# Patient Record
Sex: MALE | Race: WHITE | NOT HISPANIC OR LATINO | Employment: UNEMPLOYED | URBAN - NONMETROPOLITAN AREA
[De-identification: names, ages, dates, MRNs, and addresses within clinical notes are randomized per-mention and may not be internally consistent; named-entity substitution may affect disease eponyms.]

---

## 2020-11-04 ENCOUNTER — OFFICE VISIT (OUTPATIENT)
Dept: FAMILY MEDICINE CLINIC | Facility: CLINIC | Age: 50
End: 2020-11-04

## 2020-11-04 VITALS
TEMPERATURE: 98.2 F | OXYGEN SATURATION: 98 % | SYSTOLIC BLOOD PRESSURE: 136 MMHG | WEIGHT: 235.4 LBS | HEIGHT: 74 IN | BODY MASS INDEX: 30.21 KG/M2 | DIASTOLIC BLOOD PRESSURE: 84 MMHG | HEART RATE: 91 BPM

## 2020-11-04 DIAGNOSIS — G89.29 CHRONIC NONINTRACTABLE HEADACHE, UNSPECIFIED HEADACHE TYPE: ICD-10-CM

## 2020-11-04 DIAGNOSIS — F43.10 PTSD (POST-TRAUMATIC STRESS DISORDER): ICD-10-CM

## 2020-11-04 DIAGNOSIS — R51.9 CHRONIC NONINTRACTABLE HEADACHE, UNSPECIFIED HEADACHE TYPE: ICD-10-CM

## 2020-11-04 DIAGNOSIS — E11.65 TYPE 2 DIABETES MELLITUS WITH HYPERGLYCEMIA, WITHOUT LONG-TERM CURRENT USE OF INSULIN (HCC): Primary | ICD-10-CM

## 2020-11-04 DIAGNOSIS — J30.2 SEASONAL ALLERGIES: ICD-10-CM

## 2020-11-04 PROCEDURE — 99203 OFFICE O/P NEW LOW 30 MIN: CPT | Performed by: FAMILY MEDICINE

## 2020-11-04 PROCEDURE — 80061 LIPID PANEL: CPT | Performed by: FAMILY MEDICINE

## 2020-11-04 PROCEDURE — 83036 HEMOGLOBIN GLYCOSYLATED A1C: CPT | Performed by: FAMILY MEDICINE

## 2020-11-04 PROCEDURE — 85025 COMPLETE CBC W/AUTO DIFF WBC: CPT | Performed by: FAMILY MEDICINE

## 2020-11-04 PROCEDURE — 80053 COMPREHEN METABOLIC PANEL: CPT | Performed by: FAMILY MEDICINE

## 2020-11-04 RX ORDER — LORATADINE 10 MG/1
10 TABLET ORAL DAILY
Qty: 90 TABLET | Refills: 3 | Status: SHIPPED | OUTPATIENT
Start: 2020-11-04 | End: 2020-12-10 | Stop reason: SDUPTHER

## 2020-11-04 RX ORDER — LORATADINE 10 MG/1
10 TABLET ORAL DAILY
COMMUNITY
End: 2020-11-04 | Stop reason: SDUPTHER

## 2020-11-04 RX ORDER — TRIAMCINOLONE ACETONIDE 55 UG/1
2 SPRAY, METERED NASAL DAILY
Qty: 16.5 G | Refills: 11 | Status: SHIPPED | OUTPATIENT
Start: 2020-11-04 | End: 2020-12-10

## 2020-11-04 RX ORDER — FLUOXETINE HYDROCHLORIDE 20 MG/1
20 CAPSULE ORAL DAILY
Qty: 90 CAPSULE | Refills: 0 | Status: SHIPPED | OUTPATIENT
Start: 2020-11-04 | End: 2020-12-10 | Stop reason: SDUPTHER

## 2020-11-04 NOTE — PROGRESS NOTES
"Subjective   Jamarcus Allison is a 50 y.o. male.   Pt presents today with CC of PTSD      History of Present Illness   Patient is a 50-year-old male here to establish care.  #1 patient reports a history of poorly controlled diabetes.  It has been a few years since he has taken medications for this.  He reports that he does not follow a diabetic diet.  Previously, he was trying to exercise more but has not been exercising this year because of COVID-19 concerns.  He is agreeable to labs today.  #2 patient reports a history of anxiety associated with PTSD.  PTSD triggers include noise.  He wears earmuffs and earplugs.  He has tried to invest in noise canceling headphones.  He reports that loud noises \"set him off\".  He has taken Prozac in the past.  He states he also took several other medications though he does not remember the names of them.  He was a patient with [facility] previously but was lost to follow-up.  He would like a referral to psychiatry.  He has a history of suicidal ideation.  He states that today he is not suicidal.  He has never attempted suicide, reportedly.  He has never had an actual plan to take his own life.  #3 patient has seasonal allergies.  He did not tolerate nasal sprays in the past but takes Claritin 10 mg daily year-round.  #4 he complains of chronic headaches.  Takes Tylenol as needed for these.  He denies vision changes and his headaches are not reported as intractable.  #5 he complains of chronic pain in his back and shoulders.  He denies trauma.  He admits that he has poor posture and this is associated.         The following portions of the patient's history were reviewed and updated as appropriate: allergies, current medications, past family history, past medical history, past social history, past surgical history and problem list.    Review of Systems   Constitutional: Negative for chills, fever and unexpected weight loss.   HENT: Negative for congestion and sore throat.    Eyes: " Negative for blurred vision and visual disturbance.   Respiratory: Negative for cough and wheezing.    Cardiovascular: Negative for chest pain and palpitations.   Gastrointestinal: Negative for abdominal pain and diarrhea.   Endocrine: Negative for cold intolerance and heat intolerance.   Genitourinary: Negative for dysuria.   Musculoskeletal: Negative for arthralgias and neck stiffness.   Neurological: Negative for dizziness, seizures and syncope.   Psychiatric/Behavioral: Negative for self-injury, suicidal ideas and depressed mood. The patient is nervous/anxious.        Objective   Physical Exam  Vitals signs and nursing note reviewed.   Constitutional:       Appearance: He is well-developed.   HENT:      Head: Normocephalic and atraumatic.      Right Ear: External ear normal.      Left Ear: External ear normal.      Nose: Nose normal.   Neck:      Musculoskeletal: Normal range of motion and neck supple.   Cardiovascular:      Rate and Rhythm: Normal rate and regular rhythm.      Heart sounds: Normal heart sounds.   Pulmonary:      Effort: Pulmonary effort is normal.      Breath sounds: Normal breath sounds.   Abdominal:      General: Bowel sounds are normal.      Palpations: Abdomen is soft.   Skin:     General: Skin is warm and dry.   Neurological:      Mental Status: He is alert and oriented to person, place, and time.   Psychiatric:      Comments: Patient had a normal appearance with the exception of wearing earplugs.  His speech was normal, he made normal eye contact, motor activity normal, his affect seemed to be full.  He does not seem to be depressed.  He is not suicidal.  Normal mood.  He is oriented to person place and time.  His short and long-term memory are intact.  He has normal attention.  He is not hallucinating and he does not have any delusions discovered.  He is not suicidal, homicidal, and he was cooperative with exam.  His insight seems to be good and his judgment seems to be good.  We did not  discuss what caused his PTSD because he has a therapist.           Assessment/Plan   Diagnoses and all orders for this visit:    1. Type 2 diabetes mellitus with hyperglycemia, without long-term current use of insulin (CMS/[facility]) (Primary)  -     Comprehensive Metabolic Panel; Future  -     CBC Auto Differential; Future  -     Hemoglobin A1c; Future  -     Lipid Panel; Future  -     Comprehensive Metabolic Panel  -     CBC Auto Differential  -     Hemoglobin A1c  -     Lipid Panel    2. Seasonal allergies  -     loratadine (CLARITIN) 10 MG tablet; Take 1 tablet by mouth Daily.  Dispense: 90 tablet; Refill: 3  -     Triamcinolone Acetonide (Nasacort Allergy 24HR) 55 MCG/ACT nasal inhaler; 2 sprays into the nostril(s) as directed by provider Daily.  Dispense: 16.5 g; Refill: 11  As he did not tolerate Flonase in the past, will try Nasacort as it is unscented.  May consider switching Claritin to Zyrtec as it tends to be stronger.  He is agreeable to the plan.  3. Chronic nonintractable headache, unspecified headache type  Recommend continuing Tylenol as needed.  We will check his lab work to ensure no abnormalities that may be correctable.  4. PTSD (post-traumatic stress disorder)  -     FLUoxetine (PROzac) 20 MG capsule; Take 1 capsule by mouth Daily.  Dispense: 90 capsule; Refill: 0  -     Ambulatory Referral to Psychiatry                 Patient's Body mass index is 30.22 kg/m². BMI is above normal parameters. Recommendations include: exercise counseling and nutrition counseling.

## 2020-11-05 DIAGNOSIS — E11.65 TYPE 2 DIABETES MELLITUS WITH HYPERGLYCEMIA, WITHOUT LONG-TERM CURRENT USE OF INSULIN (HCC): Primary | ICD-10-CM

## 2020-11-05 LAB
ALBUMIN SERPL-MCNC: 4.6 G/DL (ref 3.5–5.2)
ALBUMIN/GLOB SERPL: 1.6 G/DL
ALP SERPL-CCNC: 115 U/L (ref 39–117)
ALT SERPL W P-5'-P-CCNC: 114 U/L (ref 1–41)
ANION GAP SERPL CALCULATED.3IONS-SCNC: 10.9 MMOL/L (ref 5–15)
AST SERPL-CCNC: 57 U/L (ref 1–40)
BASOPHILS # BLD AUTO: 0.08 10*3/MM3 (ref 0–0.2)
BASOPHILS NFR BLD AUTO: 1 % (ref 0–1.5)
BILIRUB SERPL-MCNC: 0.5 MG/DL (ref 0–1.2)
BUN SERPL-MCNC: 10 MG/DL (ref 6–20)
BUN/CREAT SERPL: 16.9 (ref 7–25)
CALCIUM SPEC-SCNC: 10.1 MG/DL (ref 8.6–10.5)
CHLORIDE SERPL-SCNC: 99 MMOL/L (ref 98–107)
CHOLEST SERPL-MCNC: 237 MG/DL (ref 0–200)
CO2 SERPL-SCNC: 27.1 MMOL/L (ref 22–29)
CREAT SERPL-MCNC: 0.59 MG/DL (ref 0.76–1.27)
DEPRECATED RDW RBC AUTO: 40.3 FL (ref 37–54)
EOSINOPHIL # BLD AUTO: 0.12 10*3/MM3 (ref 0–0.4)
EOSINOPHIL NFR BLD AUTO: 1.4 % (ref 0.3–6.2)
ERYTHROCYTE [DISTWIDTH] IN BLOOD BY AUTOMATED COUNT: 12.6 % (ref 12.3–15.4)
GFR SERPL CREATININE-BSD FRML MDRD: 145 ML/MIN/1.73
GLOBULIN UR ELPH-MCNC: 2.9 GM/DL
GLUCOSE SERPL-MCNC: 360 MG/DL (ref 65–99)
HBA1C MFR BLD: 12.51 % (ref 4.8–5.6)
HCT VFR BLD AUTO: 50.2 % (ref 37.5–51)
HDLC SERPL-MCNC: 31 MG/DL (ref 40–60)
HGB BLD-MCNC: 16.8 G/DL (ref 13–17.7)
IMM GRANULOCYTES # BLD AUTO: 0.02 10*3/MM3 (ref 0–0.05)
IMM GRANULOCYTES NFR BLD AUTO: 0.2 % (ref 0–0.5)
LDLC SERPL CALC-MCNC: 169 MG/DL (ref 0–100)
LDLC/HDLC SERPL: 5.35 {RATIO}
LYMPHOCYTES # BLD AUTO: 3 10*3/MM3 (ref 0.7–3.1)
LYMPHOCYTES NFR BLD AUTO: 35.8 % (ref 19.6–45.3)
MCH RBC QN AUTO: 29.4 PG (ref 26.6–33)
MCHC RBC AUTO-ENTMCNC: 33.5 G/DL (ref 31.5–35.7)
MCV RBC AUTO: 87.8 FL (ref 79–97)
MONOCYTES # BLD AUTO: 0.67 10*3/MM3 (ref 0.1–0.9)
MONOCYTES NFR BLD AUTO: 8 % (ref 5–12)
NEUTROPHILS NFR BLD AUTO: 4.5 10*3/MM3 (ref 1.7–7)
NEUTROPHILS NFR BLD AUTO: 53.6 % (ref 42.7–76)
NRBC BLD AUTO-RTO: 0 /100 WBC (ref 0–0.2)
PLATELET # BLD AUTO: 288 10*3/MM3 (ref 140–450)
PMV BLD AUTO: 12.3 FL (ref 6–12)
POTASSIUM SERPL-SCNC: 4.5 MMOL/L (ref 3.5–5.2)
PROT SERPL-MCNC: 7.5 G/DL (ref 6–8.5)
RBC # BLD AUTO: 5.72 10*6/MM3 (ref 4.14–5.8)
SODIUM SERPL-SCNC: 137 MMOL/L (ref 136–145)
TRIGL SERPL-MCNC: 200 MG/DL (ref 0–150)
VLDLC SERPL-MCNC: 37 MG/DL (ref 5–40)
WBC # BLD AUTO: 8.39 10*3/MM3 (ref 3.4–10.8)

## 2020-11-05 RX ORDER — ATORVASTATIN CALCIUM 40 MG/1
40 TABLET, FILM COATED ORAL DAILY
Qty: 90 TABLET | Refills: 0 | Status: SHIPPED | OUTPATIENT
Start: 2020-11-19 | End: 2020-12-10 | Stop reason: SDUPTHER

## 2020-11-06 ENCOUNTER — TELEPHONE (OUTPATIENT)
Dept: FAMILY MEDICINE CLINIC | Facility: CLINIC | Age: 50
End: 2020-11-06

## 2020-12-10 ENCOUNTER — OFFICE VISIT (OUTPATIENT)
Dept: FAMILY MEDICINE CLINIC | Facility: CLINIC | Age: 50
End: 2020-12-10

## 2020-12-10 VITALS
HEART RATE: 84 BPM | TEMPERATURE: 97.1 F | HEIGHT: 74 IN | SYSTOLIC BLOOD PRESSURE: 130 MMHG | WEIGHT: 230 LBS | BODY MASS INDEX: 29.52 KG/M2 | DIASTOLIC BLOOD PRESSURE: 84 MMHG | OXYGEN SATURATION: 99 %

## 2020-12-10 DIAGNOSIS — J30.2 SEASONAL ALLERGIES: ICD-10-CM

## 2020-12-10 DIAGNOSIS — F43.10 PTSD (POST-TRAUMATIC STRESS DISORDER): ICD-10-CM

## 2020-12-10 DIAGNOSIS — E11.65 UNCONTROLLED TYPE 2 DIABETES MELLITUS WITH HYPERGLYCEMIA (HCC): Primary | ICD-10-CM

## 2020-12-10 DIAGNOSIS — E11.65 TYPE 2 DIABETES MELLITUS WITH HYPERGLYCEMIA, WITHOUT LONG-TERM CURRENT USE OF INSULIN (HCC): ICD-10-CM

## 2020-12-10 PROCEDURE — 99214 OFFICE O/P EST MOD 30 MIN: CPT | Performed by: FAMILY MEDICINE

## 2020-12-10 RX ORDER — FLUOXETINE HYDROCHLORIDE 20 MG/1
20 CAPSULE ORAL DAILY
Qty: 90 CAPSULE | Refills: 0 | Status: SHIPPED | OUTPATIENT
Start: 2020-12-10 | End: 2021-03-01 | Stop reason: SDUPTHER

## 2020-12-10 RX ORDER — BLOOD-GLUCOSE METER
1 KIT MISCELLANEOUS DAILY
Qty: 1 EACH | Refills: 0 | Status: SHIPPED | OUTPATIENT
Start: 2020-12-10

## 2020-12-10 RX ORDER — ATORVASTATIN CALCIUM 40 MG/1
40 TABLET, FILM COATED ORAL DAILY
Qty: 90 TABLET | Refills: 0 | Status: SHIPPED | OUTPATIENT
Start: 2020-12-10 | End: 2021-03-01 | Stop reason: SDUPTHER

## 2020-12-10 RX ORDER — LANCETS 28 GAUGE
EACH MISCELLANEOUS
Qty: 200 EACH | Refills: 12 | Status: SHIPPED | OUTPATIENT
Start: 2020-12-10 | End: 2021-06-24 | Stop reason: SDUPTHER

## 2020-12-10 RX ORDER — LISINOPRIL 5 MG/1
5 TABLET ORAL DAILY
Qty: 90 TABLET | Refills: 0 | Status: SHIPPED | OUTPATIENT
Start: 2020-12-10 | End: 2021-03-01 | Stop reason: SDUPTHER

## 2020-12-10 RX ORDER — GLIPIZIDE 5 MG/1
5 TABLET ORAL
Qty: 180 TABLET | Refills: 0 | Status: SHIPPED | OUTPATIENT
Start: 2020-12-10 | End: 2021-03-01 | Stop reason: SDUPTHER

## 2020-12-10 RX ORDER — LORATADINE 10 MG/1
10 TABLET ORAL DAILY
Qty: 90 TABLET | Refills: 3 | Status: SHIPPED | OUTPATIENT
Start: 2020-12-10 | End: 2021-03-01 | Stop reason: SDUPTHER

## 2020-12-10 NOTE — PROGRESS NOTES
Subjective   Jamarcus Allison is a 50 y.o. male.   Pt presents today with CC of Diabetes      History of Present Illness   1.  Patient is here to follow-up on poorly controlled diabetes.  He did not tolerate Metformin and would like to try something different.  He is agreeable to continuing statin, starting lisinopril 5 mg daily, and starting a different medication.  He admits that he does not exercise because of concern for COVID-19.  He is agreeable to check his blood sugar every day fasting.  He needs supplies.  #2 he is following up on PTSD.  He is doing well with Prozac 20 mg daily.  He would like refill.  He has an appointment with psychiatry coming up in February.  #3 he has seasonal allergies for which he takes loratadine.  This medication works well.           The following portions of the patient's history were reviewed and updated as appropriate: allergies, current medications, past family history, past medical history, past social history, past surgical history and problem list.    Review of Systems   Constitutional: Negative for chills, fever and unexpected weight loss.   HENT: Negative for congestion and sore throat.    Eyes: Negative for blurred vision and visual disturbance.   Respiratory: Negative for cough and wheezing.    Cardiovascular: Negative for chest pain and palpitations.   Gastrointestinal: Negative for abdominal pain and diarrhea.   Endocrine: Negative for cold intolerance and heat intolerance.   Genitourinary: Negative for dysuria.   Musculoskeletal: Negative for arthralgias and neck stiffness.   Neurological: Negative for dizziness, seizures and syncope.   Psychiatric/Behavioral: Negative for self-injury, suicidal ideas and depressed mood.       Objective   Physical Exam  Vitals signs and nursing note reviewed.   Constitutional:       Appearance: He is well-developed.   HENT:      Head: Normocephalic and atraumatic.      Right Ear: External ear normal.      Left Ear: External ear normal.       Nose: Nose normal.   Eyes:      Conjunctiva/sclera: Conjunctivae normal.      Pupils: Pupils are equal, round, and reactive to light.   Neck:      Musculoskeletal: Normal range of motion and neck supple.   Cardiovascular:      Rate and Rhythm: Normal rate and regular rhythm.      Heart sounds: Normal heart sounds.   Pulmonary:      Effort: Pulmonary effort is normal.      Breath sounds: Normal breath sounds.   Abdominal:      General: Bowel sounds are normal.      Palpations: Abdomen is soft.   Skin:     General: Skin is warm and dry.   Neurological:      Mental Status: He is alert and oriented to person, place, and time.   Psychiatric:         Behavior: Behavior normal.           Assessment/Plan   Diagnoses and all orders for this visit:    1. Uncontrolled type 2 diabetes mellitus with hyperglycemia (CMS/HCC) (Primary)    2. Seasonal allergies  -     loratadine (CLARITIN) 10 MG tablet; Take 1 tablet by mouth Daily.  Dispense: 90 tablet; Refill: 3    3. PTSD (post-traumatic stress disorder)  -     FLUoxetine (PROzac) 20 MG capsule; Take 1 capsule by mouth Daily.  Dispense: 90 capsule; Refill: 0  He has an appointment in February to establish with psychiatry.  He is agreeable to keep this appointment.  He is currently doing well on his current treatment.  4. Type 2 diabetes mellitus with hyperglycemia, without long-term current use of insulin (CMS/HCC)  -     atorvastatin (LIPITOR) 40 MG tablet; Take 1 tablet by mouth Daily.  Dispense: 90 tablet; Refill: 0  -     lisinopril (PRINIVIL,ZESTRIL) 5 MG tablet; Take 1 tablet by mouth Daily.  Dispense: 90 tablet; Refill: 0  -     glipizide (Glucotrol) 5 MG tablet; Take 1 tablet by mouth 2 (Two) Times a Day Before Meals.  Dispense: 180 tablet; Refill: 0  -     glucose blood test strip; Check daily  Dispense: 200 each; Refill: 12  -     Lancets (freestyle) lancets; Check daily fasting  Dispense: 200 each; Refill: 12  -     Blood Glucose Monitoring Suppl (B-D LOGIC BLOOD  GLUCOSE) w/Device kit; 1 Device Daily.  Dispense: 1 each; Refill: 0    Did not tolerate Metformin.  Will start glipizide 5 mg twice a day.  He states that he is cutting sugar out of his diet.  He is going to check his blood sugar every day.  He is on Lipitor 40, starting lisinopril 5 mg daily.  The side effects of these medications were all discussed.  If he has any problems he is going to call the office.  He is agreeable to exercise 3 times a week.                Patient's Body mass index is 29.52 kg/m². BMI is above normal parameters. Recommendations include: exercise counseling and nutrition counseling.

## 2020-12-16 PROBLEM — E11.65 TYPE 2 DIABETES MELLITUS WITH HYPERGLYCEMIA, WITHOUT LONG-TERM CURRENT USE OF INSULIN: Status: ACTIVE | Noted: 2020-12-16

## 2021-01-31 DIAGNOSIS — E11.65 TYPE 2 DIABETES MELLITUS WITH HYPERGLYCEMIA, WITHOUT LONG-TERM CURRENT USE OF INSULIN (HCC): ICD-10-CM

## 2021-02-01 NOTE — TELEPHONE ENCOUNTER
Who requested this refill?  He is no longer taking this medication as he reports he did not tolerate it and was switched to something different.

## 2021-03-01 ENCOUNTER — OFFICE VISIT (OUTPATIENT)
Dept: FAMILY MEDICINE CLINIC | Facility: CLINIC | Age: 51
End: 2021-03-01

## 2021-03-01 VITALS
DIASTOLIC BLOOD PRESSURE: 80 MMHG | TEMPERATURE: 96.8 F | HEART RATE: 85 BPM | WEIGHT: 230.2 LBS | SYSTOLIC BLOOD PRESSURE: 126 MMHG | BODY MASS INDEX: 29.54 KG/M2 | HEIGHT: 74 IN | OXYGEN SATURATION: 97 %

## 2021-03-01 DIAGNOSIS — J30.2 SEASONAL ALLERGIES: ICD-10-CM

## 2021-03-01 DIAGNOSIS — E11.65 TYPE 2 DIABETES MELLITUS WITH HYPERGLYCEMIA, WITHOUT LONG-TERM CURRENT USE OF INSULIN (HCC): ICD-10-CM

## 2021-03-01 DIAGNOSIS — F43.10 PTSD (POST-TRAUMATIC STRESS DISORDER): ICD-10-CM

## 2021-03-01 DIAGNOSIS — R73.9 HYPERGLYCEMIA: Primary | ICD-10-CM

## 2021-03-01 PROCEDURE — 99213 OFFICE O/P EST LOW 20 MIN: CPT | Performed by: FAMILY MEDICINE

## 2021-03-01 RX ORDER — PIOGLITAZONEHYDROCHLORIDE 15 MG/1
15 TABLET ORAL DAILY
Qty: 90 TABLET | Refills: 0 | Status: SHIPPED | OUTPATIENT
Start: 2021-03-01 | End: 2021-05-28

## 2021-03-01 RX ORDER — GLIPIZIDE 5 MG/1
5 TABLET ORAL
Qty: 180 TABLET | Refills: 0 | Status: SHIPPED | OUTPATIENT
Start: 2021-03-01 | End: 2021-05-28

## 2021-03-01 RX ORDER — LORATADINE 10 MG/1
10 TABLET ORAL DAILY
Qty: 90 TABLET | Refills: 3 | Status: SHIPPED | OUTPATIENT
Start: 2021-03-01 | End: 2021-06-24 | Stop reason: SDUPTHER

## 2021-03-01 RX ORDER — ATORVASTATIN CALCIUM 40 MG/1
40 TABLET, FILM COATED ORAL NIGHTLY
Qty: 90 TABLET | Refills: 0 | Status: SHIPPED | OUTPATIENT
Start: 2021-03-01 | End: 2021-06-24 | Stop reason: SDUPTHER

## 2021-03-01 RX ORDER — FLUOXETINE HYDROCHLORIDE 20 MG/1
20 CAPSULE ORAL DAILY
Qty: 90 CAPSULE | Refills: 0 | Status: SHIPPED | OUTPATIENT
Start: 2021-03-01 | End: 2021-04-13 | Stop reason: SDUPTHER

## 2021-03-01 RX ORDER — LISINOPRIL 5 MG/1
5 TABLET ORAL DAILY
Qty: 90 TABLET | Refills: 0 | Status: SHIPPED | OUTPATIENT
Start: 2021-03-01 | End: 2021-05-28

## 2021-03-01 NOTE — PROGRESS NOTES
Subjective   Jamarcus Allison is a 51 y.o. male.   Pt presents today with CC of Diabetes      History of Present Illness   1.  Patient is a 51-year-old male with severe anxiety here to follow-up on hyperglycemia and type 2 diabetes.  He currently takes Glucotrol just 5 mg daily.  He refuses Metformin as he had diarrhea in the past with this medication.  He is agreeable to other medications though cost is a major concern.  He reports that he has no money to pay co-pay.  #2 he has history of PTSD associated with severe anxiety and agoraphobia.  He takes Prozac 20 mg daily.  He missed his appointment with psychiatry.  He is agreeable to reschedule.  #3 for financial reasons, he does not want labs today but is agreeable to have them in 3 months.         The following portions of the patient's history were reviewed and updated as appropriate: allergies, current medications, past family history, past medical history, past social history, past surgical history and problem list.    Review of Systems   Constitutional: Negative for chills, fever and unexpected weight loss.   HENT: Negative for congestion and sore throat.    Eyes: Negative for blurred vision and visual disturbance.   Respiratory: Negative for cough and wheezing.    Cardiovascular: Negative for chest pain and palpitations.   Gastrointestinal: Negative for abdominal pain and diarrhea.   Endocrine: Negative for cold intolerance and heat intolerance.   Genitourinary: Negative for dysuria.   Musculoskeletal: Negative for arthralgias and neck stiffness.   Neurological: Negative for dizziness, seizures and syncope.   Psychiatric/Behavioral: Negative for self-injury, suicidal ideas and depressed mood.       Objective   Physical Exam  Vitals signs and nursing note reviewed.   Constitutional:       Appearance: He is well-developed.   HENT:      Head: Normocephalic and atraumatic.      Right Ear: External ear normal.      Left Ear: External ear normal.      Nose: Nose  normal.   Eyes:      Conjunctiva/sclera: Conjunctivae normal.      Pupils: Pupils are equal, round, and reactive to light.   Neck:      Musculoskeletal: Normal range of motion and neck supple.   Cardiovascular:      Rate and Rhythm: Normal rate and regular rhythm.      Heart sounds: Normal heart sounds.   Pulmonary:      Effort: Pulmonary effort is normal.      Breath sounds: Normal breath sounds.   Abdominal:      General: Bowel sounds are normal.      Palpations: Abdomen is soft.   Skin:     General: Skin is warm and dry.   Neurological:      Mental Status: He is alert and oriented to person, place, and time.   Psychiatric:         Behavior: Behavior normal.           Assessment/Plan   Diagnoses and all orders for this visit:    1. Hyperglycemia (Primary)  As below  2. Type 2 diabetes mellitus with hyperglycemia, without long-term current use of insulin (CMS/[facility])  -     glipizide (Glucotrol) 5 MG tablet; Take 1 tablet by mouth 2 (Two) Times a Day Before Meals.  Dispense: 180 tablet; Refill: 0  -     lisinopril (PRINIVIL,ZESTRIL) 5 MG tablet; Take 1 tablet by mouth Daily.  Dispense: 90 tablet; Refill: 0  -     atorvastatin (LIPITOR) 40 MG tablet; Take 1 tablet by mouth Every Night.  Dispense: 90 tablet; Refill: 0  We will start Actos 15 mg daily.  The risks and benefits of this medication were discussed.  He is not agreeable to insulin or any other injectable.  I doubt that Actos will significantly control his blood sugar though he would like to do something with it.  He is agreeable to strict diet.  He is lost 5 pounds since November.  We will add another medication in the future if needed.  May consider retrial of Metformin at a lower dose.  3. PTSD (post-traumatic stress disorder)  -     FLUoxetine (PROzac) 20 MG capsule; Take 1 capsule by mouth Daily.  Dispense: 90 capsule; Refill: 0  He has missed his appointment with psychiatry.  He states that he is currently stable on Prozac though will need to reschedule  with psychiatry.  4. Seasonal allergies  -     loratadine (CLARITIN) 10 MG tablet; Take 1 tablet by mouth Daily.  Dispense: 90 tablet; Refill: 3    Other orders  -     pioglitazone (Actos) 15 MG tablet; Take 1 tablet by mouth Daily.  Dispense: 90 tablet; Refill: 0                 Patient's Body mass index is 29.54 kg/m². BMI is above normal parameters. Recommendations include: exercise counseling and nutrition counseling.

## 2021-04-13 ENCOUNTER — OFFICE VISIT (OUTPATIENT)
Dept: PSYCHIATRY | Facility: CLINIC | Age: 51
End: 2021-04-13

## 2021-04-13 VITALS
BODY MASS INDEX: 30.6 KG/M2 | HEIGHT: 74 IN | WEIGHT: 238.4 LBS | DIASTOLIC BLOOD PRESSURE: 78 MMHG | HEART RATE: 79 BPM | SYSTOLIC BLOOD PRESSURE: 130 MMHG

## 2021-04-13 DIAGNOSIS — F60.9 PERSONALITY DISORDER, UNSPECIFIED (HCC): ICD-10-CM

## 2021-04-13 DIAGNOSIS — Z79.899 MEDICATION MANAGEMENT: ICD-10-CM

## 2021-04-13 DIAGNOSIS — G47.09 OTHER INSOMNIA: ICD-10-CM

## 2021-04-13 DIAGNOSIS — F41.1 GENERALIZED ANXIETY DISORDER: ICD-10-CM

## 2021-04-13 DIAGNOSIS — F33.3 SEVERE EPISODE OF RECURRENT MAJOR DEPRESSIVE DISORDER, WITH PSYCHOTIC FEATURES (HCC): Primary | ICD-10-CM

## 2021-04-13 DIAGNOSIS — F39 MOOD DISORDER (HCC): ICD-10-CM

## 2021-04-13 DIAGNOSIS — F43.10 PTSD (POST-TRAUMATIC STRESS DISORDER): ICD-10-CM

## 2021-04-13 LAB
AMPHETAMINE CUT-OFF: NORMAL
BENZODIAZIPINE CUT-OFF: NORMAL
BUPRENORPHINE CUT-OFF: NORMAL
COCAINE CUT-OFF: NORMAL
EXTERNAL AMPHETAMINE SCREEN URINE: NEGATIVE
EXTERNAL BENZODIAZEPINE SCREEN URINE: NEGATIVE
EXTERNAL BUPRENORPHINE SCREEN URINE: NEGATIVE
EXTERNAL COCAINE SCREEN URINE: NEGATIVE
EXTERNAL MDMA: NEGATIVE
EXTERNAL METHADONE SCREEN URINE: NEGATIVE
EXTERNAL METHAMPHETAMINE SCREEN URINE: NEGATIVE
EXTERNAL OPIATES SCREEN URINE: NEGATIVE
EXTERNAL OXYCODONE SCREEN URINE: NEGATIVE
EXTERNAL THC SCREEN URINE: NEGATIVE
MDMA CUT-OFF: NORMAL
METHADONE CUT-OFF: NORMAL
METHAMPHETAMINE CUT-OFF: NORMAL
OPIATES CUT-OFF: NORMAL
OXYCODONE CUT-OFF: NORMAL
THC CUT-OFF: NORMAL

## 2021-04-13 PROCEDURE — 90792 PSYCH DIAG EVAL W/MED SRVCS: CPT | Performed by: NURSE PRACTITIONER

## 2021-04-13 RX ORDER — FLUOXETINE HYDROCHLORIDE 40 MG/1
40 CAPSULE ORAL DAILY
Qty: 30 CAPSULE | Refills: 0 | Status: SHIPPED | OUTPATIENT
Start: 2021-04-13 | End: 2021-05-11 | Stop reason: SDUPTHER

## 2021-04-13 RX ORDER — TRAZODONE HYDROCHLORIDE 50 MG/1
50 TABLET ORAL NIGHTLY
Qty: 30 TABLET | Refills: 0 | Status: SHIPPED | OUTPATIENT
Start: 2021-04-13 | End: 2021-05-10

## 2021-04-13 RX ORDER — HYDROXYZINE HYDROCHLORIDE 10 MG/1
10 TABLET, FILM COATED ORAL 2 TIMES DAILY PRN
Qty: 60 TABLET | Refills: 0 | Status: SHIPPED | OUTPATIENT
Start: 2021-04-13 | End: 2021-05-11 | Stop reason: SDUPTHER
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
## 2021-05-10 DIAGNOSIS — G47.09 OTHER INSOMNIA: ICD-10-CM

## 2021-05-10 RX ORDER — TRAZODONE HYDROCHLORIDE 50 MG/1
50 TABLET ORAL NIGHTLY
Qty: 30 TABLET | Refills: 0 | Status: SHIPPED | OUTPATIENT
Start: 2021-05-10 | End: 2021-05-11 | Stop reason: SDUPTHER

## 2021-05-11 ENCOUNTER — OFFICE VISIT (OUTPATIENT)
Dept: PSYCHIATRY | Facility: CLINIC | Age: 51
End: 2021-05-11

## 2021-05-11 VITALS
BODY MASS INDEX: 31.39 KG/M2 | HEIGHT: 74 IN | WEIGHT: 244.6 LBS | DIASTOLIC BLOOD PRESSURE: 83 MMHG | HEART RATE: 80 BPM | SYSTOLIC BLOOD PRESSURE: 126 MMHG

## 2021-05-11 DIAGNOSIS — F33.3 SEVERE EPISODE OF RECURRENT MAJOR DEPRESSIVE DISORDER, WITH PSYCHOTIC FEATURES (HCC): Primary | ICD-10-CM

## 2021-05-11 DIAGNOSIS — F41.1 GENERALIZED ANXIETY DISORDER: ICD-10-CM

## 2021-05-11 DIAGNOSIS — F60.9 PERSONALITY DISORDER, UNSPECIFIED (HCC): ICD-10-CM

## 2021-05-11 DIAGNOSIS — F43.10 PTSD (POST-TRAUMATIC STRESS DISORDER): ICD-10-CM

## 2021-05-11 DIAGNOSIS — G47.09 OTHER INSOMNIA: ICD-10-CM

## 2021-05-11 DIAGNOSIS — F39 MOOD DISORDER (HCC): ICD-10-CM

## 2021-05-11 DIAGNOSIS — Z79.899 MEDICATION MANAGEMENT: ICD-10-CM

## 2021-05-11 PROCEDURE — 99214 OFFICE O/P EST MOD 30 MIN: CPT | Performed by: NURSE PRACTITIONER

## 2021-05-11 RX ORDER — FLUOXETINE HYDROCHLORIDE 20 MG/1
60 CAPSULE ORAL DAILY
Qty: 90 CAPSULE | Refills: 0 | Status: SHIPPED | OUTPATIENT
Start: 2021-05-11 | End: 2021-06-08 | Stop reason: SDUPTHER

## 2021-05-11 RX ORDER — HYDROXYZINE HYDROCHLORIDE 10 MG/1
10 TABLET, FILM COATED ORAL 2 TIMES DAILY PRN
Qty: 60 TABLET | Refills: 0 | Status: SHIPPED | OUTPATIENT
Start: 2021-05-11 | End: 2021-06-08 | Stop reason: SDUPTHER

## 2021-05-11 RX ORDER — TRAZODONE HYDROCHLORIDE 50 MG/1
50 TABLET ORAL NIGHTLY
Qty: 30 TABLET | Refills: 0 | Status: SHIPPED | OUTPATIENT
Start: 2021-05-11 | End: 2021-06-08 | Stop reason: SDUPTHER

## 2021-05-11 RX ORDER — ARIPIPRAZOLE 5 MG/1
5 TABLET ORAL DAILY
Qty: 30 TABLET | Refills: 0 | Status: SHIPPED | OUTPATIENT
Start: 2021-05-11 | End: 2021-06-08 | Stop reason: SDUPTHER

## 2021-05-11 NOTE — PROGRESS NOTES
"Subjective   Jamarcus Allison is a 51 y.o. male who presents today for follow up    Chief Complaint:  Irritability, AVH    History of Present Illness: Patient presents as follow-up.  Reports slight improvement with depression with increase of fluoxetine.  States he continues to struggle with AVH, reports last week he experienced fleeting suicidal thoughts.  Adamantly convincingly denies intent.  Reports voices increase around 10 PM, states they tell him he is \"going crazy\", continues to see shadows and objects in his peripheral vision.  Reports continuing irritability with loud noises.  He reports difficulty falling asleep, states taking trazodone prior to laying down, however it takes 2-3 hours to work for him.  Reports once falling asleep he sleeps well throughout the night.  Reports appetite is good, denies any weight changes.  He denies SI/HI.    The following portions of the patient's history were reviewed and updated as appropriate: allergies, current medications, past family history, past medical history, past social history, past surgical history and problem list.      Past Medical History:  Past Medical History:   Diagnosis Date   • Depression    • Diabetes mellitus (CMS/HCC)    • Diabetic neuropathy (CMS/HCC)    • IBS (irritable bowel syndrome)    • Peripheral artery disease (CMS/HCC)    • PTSD (post-traumatic stress disorder)    • Schizoid personality (CMS/HCC)        Social History:  Social History     Socioeconomic History   • Marital status: Single     Spouse name: Not on file   • Number of children: Not on file   • Years of education: Not on file   • Highest education level: Not on file   Tobacco Use   • Smoking status: Never Smoker   • Smokeless tobacco: Never Used   Vaping Use   • Vaping Use: Never used   Substance and Sexual Activity   • Alcohol use: Never   • Drug use: Never   • Sexual activity: Defer       Family History:  Family History   Problem Relation Age of Onset   • Heart disease Mother    "   • Diabetes Mother    • Cancer Father    • Alcohol abuse Father    • Heart disease Sister    • Diabetes Sister    • Cancer Sister    • Thyroid disease Sister    • Depression Sister    • Anxiety disorder Sister    • Depression Brother    • Anxiety disorder Brother    • Alcohol abuse Brother        Past Surgical History:  Past Surgical History:   Procedure Laterality Date   • POLYPECTOMY         Problem List:  Patient Active Problem List   Diagnosis   • Type 2 diabetes mellitus with hyperglycemia, without long-term current use of insulin (CMS/[facility])       Allergy:   Allergies   Allergen Reactions   • Poison Ivy Extract Anaphylaxis   • Poison Oak Extract Anaphylaxis   • Sumac Anaphylaxis        Current Medications:   Current Outpatient Medications   Medication Sig Dispense Refill   • atorvastatin (LIPITOR) 40 MG tablet Take 1 tablet by mouth Every Night. 90 tablet 0   • Blood Glucose Monitoring Suppl (B-D LOGIC BLOOD GLUCOSE) w/Device kit 1 Device Daily. 1 each 0   • FLUoxetine (PROzac) 20 MG capsule Take 3 capsules by mouth Daily. 90 capsule 0   • glipizide (Glucotrol) 5 MG tablet Take 1 tablet by mouth 2 (Two) Times a Day Before Meals. 180 tablet 0   • glucose blood test strip Check daily 200 each 12   • hydrOXYzine (ATARAX) 10 MG tablet Take 1 tablet by mouth 2 (Two) Times a Day As Needed for Anxiety. 60 tablet 0   • Lancets (freestyle) lancets Check daily fasting 200 each 12   • lisinopril (PRINIVIL,ZESTRIL) 5 MG tablet Take 1 tablet by mouth Daily. 90 tablet 0   • loratadine (CLARITIN) 10 MG tablet Take 1 tablet by mouth Daily. 90 tablet 3   • pioglitazone (Actos) 15 MG tablet Take 1 tablet by mouth Daily. 90 tablet 0   • traZODone (DESYREL) 50 MG tablet Take 1 tablet by mouth Every Night. 30 tablet 0   • ARIPiprazole (ABILIFY) 5 MG tablet Take 1 tablet by mouth Daily. 30 tablet 0     No current facility-administered medications for this visit.       Review of Symptoms:    Review of Systems   Constitutional:  "Negative.    HENT: Negative.    Eyes: Negative.    Respiratory: Negative.    Cardiovascular: Negative.    Gastrointestinal: Negative.    Genitourinary: Negative.    Musculoskeletal: Negative.    Skin: Negative.    Neurological: Negative.    Psychiatric/Behavioral: Positive for agitation, hallucinations, sleep disturbance and depressed mood. Negative for suicidal ideas. The patient is nervous/anxious.        Objective   Physical Exam:   Blood pressure 126/83, pulse 80, height 188 cm (74.02\"), weight 111 kg (244 lb 9.6 oz).  Body mass index is 31.39 kg/m².    Appearance: Obese male, appropriately dressed, appears stated age and in no acute distress  Gait, Station, Strength: Within normal limits    Mental Status Exam:   Hygiene:   good  Cooperation:  Cooperative  Eye Contact:  Good  Psychomotor Behavior:  Appropriate  Affect:  Blunted  Mood: anxious  Hopelessness: 6  Speech:  Normal  Thought Process:  Goal directed and Linear  Thought Content:  Normal and Mood congruent  Suicidal:  None  Homicidal:  None  Hallucinations:  Auditory and Visual  Delusion:  Paranoid  Memory:  Intact  Orientation:  Person, Place, Time and Situation  Reliability:  good  Insight:  Fair  Judgement:  Fair  Impulse Control:  Fair  Physical/Medical Issues:  Yes Chronic health issues, nothing acute today     PHQ-Score Total:  PHQ-9 Total Score: 8   Patient screened positive for depression based on a PHQ-9 score of 8 on 5/11/2021. Follow-up recommendations include: Prescribed antidepressant medication treatment and Suicide Risk Assessment performed.        Lab Results:   Office Visit on 04/13/2021   Component Date Value Ref Range Status   • External Amphetamine Screen Urine 04/13/2021 Negative   Final   • Amphetamine Cut-Off 04/13/2021 1000NG/ML   Final   • External Benzodiazepine Screen Uri* 04/13/2021 Negative   Final   • Benzodiazipine Cut-Off 04/13/2021 300NG/ML   Final   • External Cocaine Screen Urine 04/13/2021 Negative   Final   • Cocaine " Cut-Off 04/13/2021 300NG/ML   Final   • External THC Screen Urine 04/13/2021 Negative   Final   • THC Cut-Off 04/13/2021 50NG/ML   Final   • External Methadone Screen Urine 04/13/2021 Negative   Final   • Methadone Cut-Off 04/13/2021 300NG/ML   Final   • External Methamphetamine Screen Ur* 04/13/2021 Negative   Final   • Methamphetamine Cut-Off 04/13/2021 1000NG/ML   Final   • External Oxycodone Screen Urine 04/13/2021 Negative   Final   • Oxycodone Cut-Off 04/13/2021 100NG/ML   Final   • External Buprenorphine Screen Urine 04/13/2021 Negative   Final   • Buprenorphine Cut-Off 04/13/2021 10NG/ML   Final   • External MDMA 04/13/2021 Negative   Final   • MDMA Cut-Off 04/13/2021 500NG/ML   Final   • External Opiates Screen Urine 04/13/2021 Negative   Final   • Opiates Cut-Off 04/13/2021 300NG/ML   Final       Assessment/Plan   Diagnoses and all orders for this visit:    1. Severe episode of recurrent major depressive disorder, with psychotic features (CMS/HCC) (Primary)  -     FLUoxetine (PROzac) 20 MG capsule; Take 3 capsules by mouth Daily.  Dispense: 90 capsule; Refill: 0  -     ARIPiprazole (ABILIFY) 5 MG tablet; Take 1 tablet by mouth Daily.  Dispense: 30 tablet; Refill: 0    2. Other insomnia  -     traZODone (DESYREL) 50 MG tablet; Take 1 tablet by mouth Every Night.  Dispense: 30 tablet; Refill: 0    3. Medication management    4. Generalized anxiety disorder  -     FLUoxetine (PROzac) 20 MG capsule; Take 3 capsules by mouth Daily.  Dispense: 90 capsule; Refill: 0  -     hydrOXYzine (ATARAX) 10 MG tablet; Take 1 tablet by mouth 2 (Two) Times a Day As Needed for Anxiety.  Dispense: 60 tablet; Refill: 0  -     traZODone (DESYREL) 50 MG tablet; Take 1 tablet by mouth Every Night.  Dispense: 30 tablet; Refill: 0  -     ARIPiprazole (ABILIFY) 5 MG tablet; Take 1 tablet by mouth Daily.  Dispense: 30 tablet; Refill: 0    5. Mood disorder (CMS/HCC)  -     FLUoxetine (PROzac) 20 MG capsule; Take 3 capsules by mouth  Daily.  Dispense: 90 capsule; Refill: 0  -     ARIPiprazole (ABILIFY) 5 MG tablet; Take 1 tablet by mouth Daily.  Dispense: 30 tablet; Refill: 0    6. PTSD (post-traumatic stress disorder)    7. Personality disorder, unspecified (CMS/HCC)  -     FLUoxetine (PROzac) 20 MG capsule; Take 3 capsules by mouth Daily.  Dispense: 90 capsule; Refill: 0  -     ARIPiprazole (ABILIFY) 5 MG tablet; Take 1 tablet by mouth Daily.  Dispense: 30 tablet; Refill: 0        -Begin aripiprazole 5 mg daily for paranoia and AVH. Lengthy discussion with patient on the possible side effects of antipsychotic medications including increased cholesterol, increased blood sugar, and possibility of weight gain.  Also discussed the need to monitor lab work associated with this.  The risk of muscle movement disorders with this class of medication was also discussed.  Although patient's A1c is elevated, benefits outweigh the risk considering AVH often leads to his suicidal thoughts  -Increase fluoxetine 60 mg daily for anxiety and depression  -Continue hydroxyzine 10 mg twice daily as needed for anxiety  -Continue trazodone 50 mg nightly for sleep  -Encouraged patient to continue psychotherapy  -We discussed sleep hygiene including going to bed at the same time and getting up at the same time every day, decreased caffeine consumption, going to bed early enough to get 7 or 8 hours in bed, reading and relaxing before bedtime, and avoiding the TV, computer, phone, iPad close to bedtime.  -Encouraged patient if suicidal thoughts return to call office or visit nearest ED as soon as possible, patient verbalized understanding  -YADIEL reviewed and appropriate. Patient counseled on use of controlled substances.   -The benefits of a healthy diet and exercise were discussed with patient, especially the positive effects they have on mental health. Patient encouraged to consider lifestyle modification regarding  diet and exercise patterns to maximize results of  mental health treatment.  -Reviewed previous available documentation  -Reviewed most recent available labs           Visit Diagnoses:    ICD-10-CM ICD-9-CM   1. Severe episode of recurrent major depressive disorder, with psychotic features (CMS/[facility])  F33.3 296.34   2. Other insomnia  G47.09 780.52   3. Medication management  Z79.899 V58.69   4. Generalized anxiety disorder  F41.1 300.02   5. Mood disorder (CMS/[facility])  F39 296.90   6. PTSD (post-traumatic stress disorder)  F43.10 309.81   7. Personality disorder, unspecified (CMS/[facility])  F60.9 301.9         TREATMENT PLAN/GOALS: Continue supportive psychotherapy efforts and medications as indicated. Treatment and medication options discussed during today's visit. Patient acknowledged and verbally consented to continue with current treatment plan and was educated on the importance of compliance with treatment and follow-up appointments.    MEDICATION ISSUES:    Discussed medication options and treatment plan of prescribed medication as well as the risks, benefits, and side effects including potential falls, possible impaired driving and metabolic adversities among others. Patient is agreeable to call the office with any worsening of symptoms or onset of side effects. Patient is agreeable to call 911 or go to the nearest ER should he/she begin having SI/HI.     MEDS ORDERED DURING VISIT:  New Medications Ordered This Visit   Medications   • FLUoxetine (PROzac) 20 MG capsule     Sig: Take 3 capsules by mouth Daily.     Dispense:  90 capsule     Refill:  0   • hydrOXYzine (ATARAX) 10 MG tablet     Sig: Take 1 tablet by mouth 2 (Two) Times a Day As Needed for Anxiety.     Dispense:  60 tablet     Refill:  0   • traZODone (DESYREL) 50 MG tablet     Sig: Take 1 tablet by mouth Every Night.     Dispense:  30 tablet     Refill:  0   • ARIPiprazole (ABILIFY) 5 MG tablet     Sig: Take 1 tablet by mouth Daily.     Dispense:  30 tablet     Refill:  0       Return in about 4 weeks  (around 6/8/2021).         Prognosis: Guarded dependent on medication/follow up and treatment plan compliance.  Functionality: pt showing improvements in important areas of daily functioning.     Short-term goals: Patient will adhere to medication regimen and note continued improvement in symptoms over the next 3 months.   Long-term goals: Patient will be adherent to medication management and psychotherapy with continued improvement in symptoms over the next 6 months          This document has been electronically signed by RUPINDER Gann   May 11, 2021 13:37 EDT    Part of this note may be an electronic transcription/translation of spoken language to printed text using the Dragon Dictation System.

## 2021-05-28 DIAGNOSIS — E11.65 TYPE 2 DIABETES MELLITUS WITH HYPERGLYCEMIA, WITHOUT LONG-TERM CURRENT USE OF INSULIN (HCC): ICD-10-CM

## 2021-05-28 RX ORDER — GLIPIZIDE 5 MG/1
TABLET ORAL
Qty: 180 TABLET | Refills: 0 | Status: SHIPPED | OUTPATIENT
Start: 2021-05-28 | End: 2021-06-24

## 2021-05-28 RX ORDER — PIOGLITAZONEHYDROCHLORIDE 15 MG/1
15 TABLET ORAL DAILY
Qty: 90 TABLET | Refills: 0 | Status: SHIPPED | OUTPATIENT
Start: 2021-05-28 | End: 2021-06-24 | Stop reason: SDUPTHER

## 2021-05-28 RX ORDER — LISINOPRIL 5 MG/1
5 TABLET ORAL DAILY
Qty: 90 TABLET | Refills: 0 | Status: SHIPPED | OUTPATIENT
Start: 2021-05-28 | End: 2021-06-24 | Stop reason: SDUPTHER

## 2021-06-07 DIAGNOSIS — F33.3 SEVERE EPISODE OF RECURRENT MAJOR DEPRESSIVE DISORDER, WITH PSYCHOTIC FEATURES (HCC): ICD-10-CM

## 2021-06-07 DIAGNOSIS — F39 MOOD DISORDER (HCC): ICD-10-CM

## 2021-06-07 DIAGNOSIS — F41.1 GENERALIZED ANXIETY DISORDER: ICD-10-CM

## 2021-06-07 DIAGNOSIS — G47.09 OTHER INSOMNIA: ICD-10-CM

## 2021-06-07 DIAGNOSIS — F60.9 PERSONALITY DISORDER, UNSPECIFIED (HCC): ICD-10-CM

## 2021-06-08 ENCOUNTER — OFFICE VISIT (OUTPATIENT)
Dept: PSYCHIATRY | Facility: CLINIC | Age: 51
End: 2021-06-08

## 2021-06-08 VITALS
TEMPERATURE: 97.6 F | HEIGHT: 74 IN | OXYGEN SATURATION: 97 % | SYSTOLIC BLOOD PRESSURE: 120 MMHG | WEIGHT: 242 LBS | BODY MASS INDEX: 31.06 KG/M2 | HEART RATE: 91 BPM | DIASTOLIC BLOOD PRESSURE: 68 MMHG

## 2021-06-08 DIAGNOSIS — F60.9 PERSONALITY DISORDER, UNSPECIFIED (HCC): Primary | ICD-10-CM

## 2021-06-08 DIAGNOSIS — G47.09 OTHER INSOMNIA: ICD-10-CM

## 2021-06-08 DIAGNOSIS — F33.3 SEVERE EPISODE OF RECURRENT MAJOR DEPRESSIVE DISORDER, WITH PSYCHOTIC FEATURES (HCC): ICD-10-CM

## 2021-06-08 DIAGNOSIS — F41.1 GENERALIZED ANXIETY DISORDER: ICD-10-CM

## 2021-06-08 DIAGNOSIS — F43.10 PTSD (POST-TRAUMATIC STRESS DISORDER): ICD-10-CM

## 2021-06-08 DIAGNOSIS — Z79.899 MEDICATION MANAGEMENT: ICD-10-CM

## 2021-06-08 DIAGNOSIS — F39 MOOD DISORDER (HCC): ICD-10-CM

## 2021-06-08 PROCEDURE — 99214 OFFICE O/P EST MOD 30 MIN: CPT | Performed by: NURSE PRACTITIONER

## 2021-06-08 RX ORDER — HYDROXYZINE HYDROCHLORIDE 10 MG/1
10 TABLET, FILM COATED ORAL 2 TIMES DAILY PRN
Qty: 60 TABLET | Refills: 0 | Status: SHIPPED | OUTPATIENT
Start: 2021-06-08 | End: 2021-07-06 | Stop reason: SDUPTHER

## 2021-06-08 RX ORDER — ARIPIPRAZOLE 5 MG/1
5 TABLET ORAL DAILY
Qty: 30 TABLET | Refills: 0 | OUTPATIENT
Start: 2021-06-08

## 2021-06-08 RX ORDER — FLUOXETINE HYDROCHLORIDE 20 MG/1
60 CAPSULE ORAL DAILY
Qty: 90 CAPSULE | Refills: 0 | Status: SHIPPED | OUTPATIENT
Start: 2021-06-08 | End: 2021-07-06 | Stop reason: SDUPTHER

## 2021-06-08 RX ORDER — TRAZODONE HYDROCHLORIDE 50 MG/1
50 TABLET ORAL NIGHTLY
Qty: 30 TABLET | Refills: 0 | OUTPATIENT
Start: 2021-06-08

## 2021-06-08 RX ORDER — ARIPIPRAZOLE 5 MG/1
5 TABLET ORAL DAILY
Qty: 30 TABLET | Refills: 0 | Status: SHIPPED | OUTPATIENT
Start: 2021-06-08 | End: 2021-07-06 | Stop reason: SDUPTHER

## 2021-06-08 RX ORDER — TRAZODONE HYDROCHLORIDE 50 MG/1
50 TABLET ORAL NIGHTLY
Qty: 30 TABLET | Refills: 0 | Status: SHIPPED | OUTPATIENT
Start: 2021-06-08 | End: 2021-07-06 | Stop reason: SDUPTHER

## 2021-06-08 NOTE — PROGRESS NOTES
"Subjective   Jamarcus Allison is a 51 y.o. male who presents today for follow up    Chief Complaint:  Irritability, AVH    History of Present Illness: Patient presents as follow-up.  Reports slight improvement with depression however he states he now feels \"numb and less creative\". 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 Reports decrease with voices since last visit. States he continues to have fleeting suicidal thoughts, he adamantly and convincingly denies intent. Reports he is able to redirect and focuses on other tasks. Reports continuing irritability with loud noises. States he was at [employer] and a worker yelled at him for not using tongs, states he feel very uncomfortable and anxious.  Reports sleep has improved, denies nightmares. Reports appetite is good, denies any weight changes.  He denies HI.    The following portions of the patient's history were reviewed and updated as appropriate: allergies, current medications, past family history, past medical history, past social history, past surgical history and problem list.      Past Medical History:  Past Medical History:   Diagnosis Date   • Depression    • Diabetes mellitus (CMS/HCC)    • Diabetic neuropathy (CMS/HCC)    • IBS (irritable bowel syndrome)    • Peripheral artery disease (CMS/HCC)    • PTSD (post-traumatic stress disorder)    • Schizoid personality (CMS/HCC)        Social History:  Social History     Socioeconomic History   • Marital status: Single     Spouse name: Not on file   • Number of children: Not on file   • Years of education: Not on file   • Highest education level: Not on file   Tobacco Use   • Smoking status: Never Smoker   • Smokeless tobacco: Never Used   Vaping Use   • Vaping Use: Never used   Substance and Sexual Activity   • Alcohol use: Never   • Drug use: Never   • Sexual activity: Defer       Family History:  Family History   Problem Relation Age of Onset   • Heart disease Mother    • Diabetes Mother    • " Cancer Father    • Alcohol abuse Father    • Heart disease Sister    • Diabetes Sister    • Cancer Sister    • Thyroid disease Sister    • Depression Sister    • Anxiety disorder Sister    • Depression Brother    • Anxiety disorder Brother    • Alcohol abuse Brother        Past Surgical History:  Past Surgical History:   Procedure Laterality Date   • POLYPECTOMY         Problem List:  Patient Active Problem List   Diagnosis   • Type 2 diabetes mellitus with hyperglycemia, without long-term current use of insulin (CMS/[facility])       Allergy:   Allergies   Allergen Reactions   • Poison Ivy Extract Anaphylaxis   • Poison Oak Extract Anaphylaxis   • Sumac Anaphylaxis        Current Medications:   Current Outpatient Medications   Medication Sig Dispense Refill   • ARIPiprazole (ABILIFY) 5 MG tablet Take 1 tablet by mouth Daily. 30 tablet 0   • atorvastatin (LIPITOR) 40 MG tablet Take 1 tablet by mouth Every Night. 90 tablet 0   • Blood Glucose Monitoring Suppl ([employer] LOGIC BLOOD GLUCOSE) w/Device kit 1 Device Daily. 1 each 0   • FLUoxetine (PROzac) 20 MG capsule Take 3 capsules by mouth Daily. 90 capsule 0   • glipizide (GLUCOTROL) 5 MG tablet TAKE 1 TABLET BY MOUTH TWICE DAILY BEFORE MEALS 180 tablet 0   • glucose blood test strip Check daily 200 each 12   • hydrOXYzine (ATARAX) 10 MG tablet Take 1 tablet by mouth 2 (Two) Times a Day As Needed for Anxiety. 60 tablet 0   • Lancets (freestyle) lancets Check daily fasting 200 each 12   • lisinopril (PRINIVIL,ZESTRIL) 5 MG tablet TAKE 1 TABLET BY MOUTH DAILY 90 tablet 0   • loratadine (CLARITIN) 10 MG tablet Take 1 tablet by mouth Daily. 90 tablet 3   • pioglitazone (ACTOS) 15 MG tablet TAKE 1 TABLET BY MOUTH DAILY 90 tablet 0   • traZODone (DESYREL) 50 MG tablet Take 1 tablet by mouth Every Night. 30 tablet 0     No current facility-administered medications for this visit.       Review of Symptoms:    Review of Systems   Constitutional: Negative.    HENT: Negative.    Eyes:  "Negative.    Respiratory: Negative.    Cardiovascular: Negative.    Gastrointestinal: Negative.    Endocrine: Negative.    Genitourinary: Negative.    Musculoskeletal: Negative.    Skin: Negative.    Neurological: Negative.    Psychiatric/Behavioral: Positive for agitation, decreased concentration, hallucinations and depressed mood. Negative for suicidal ideas. The patient is nervous/anxious.        Objective   Physical Exam:   Blood pressure 120/68, pulse 91, temperature 97.6 °F (36.4 °C), height 188 cm (74.02\"), weight 110 kg (242 lb), SpO2 97 %.  Body mass index is 31.06 kg/m².    Appearance: Obese male, appropriately dressed, appears stated age and in no acute distress  Gait, Station, Strength: Within normal limits    Mental Status Exam:   Hygiene:   good  Cooperation:  Cooperative  Eye Contact:  Good  Psychomotor Behavior:  Appropriate  Affect:  Blunted  Mood: anxious  Hopelessness: 6  Speech:  Normal  Thought Process:  Goal directed and Linear  Thought Content:  Normal and Mood congruent  Suicidal:  None  Homicidal:  None  Hallucinations:  Auditory and Visual  Delusion:  Paranoid  Memory:  Intact  Orientation:  Person, Place, Time and Situation  Reliability:  good  Insight:  Fair  Judgement:  Fair  Impulse Control:  Fair  Physical/Medical Issues:  Yes Chronic health issues, nothing acute today     PHQ-Score Total:  PHQ-9 Total Score: 4   Patient screened positive for depression based on a PHQ-9 score of 4 on 6/8/2021. Follow-up recommendations include: Prescribed antidepressant medication treatment and Suicide Risk Assessment performed.        Lab Results:   No visits with results within 1 Month(s) from this visit.   Latest known visit with results is:   Office Visit on 04/13/2021   Component Date Value Ref Range Status   • External Amphetamine Screen Urine 04/13/2021 Negative   Final   • Amphetamine Cut-Off 04/13/2021 1000NG/ML   Final   • External Benzodiazepine Screen Uri* 04/13/2021 Negative   Final   • " Benzodiazipine Cut-Off 04/13/2021 300NG/ML   Final   • External Cocaine Screen Urine 04/13/2021 Negative   Final   • Cocaine Cut-Off 04/13/2021 300NG/ML   Final   • External THC Screen Urine 04/13/2021 Negative   Final   • THC Cut-Off 04/13/2021 50NG/ML   Final   • External Methadone Screen Urine 04/13/2021 Negative   Final   • Methadone Cut-Off 04/13/2021 300NG/ML   Final   • External Methamphetamine Screen Ur* 04/13/2021 Negative   Final   • Methamphetamine Cut-Off 04/13/2021 1000NG/ML   Final   • External Oxycodone Screen Urine 04/13/2021 Negative   Final   • Oxycodone Cut-Off 04/13/2021 100NG/ML   Final   • External Buprenorphine Screen Urine 04/13/2021 Negative   Final   • Buprenorphine Cut-Off 04/13/2021 10NG/ML   Final   • External MDMA 04/13/2021 Negative   Final   • MDMA Cut-Off 04/13/2021 500NG/ML   Final   • External Opiates Screen Urine 04/13/2021 Negative   Final   • Opiates Cut-Off 04/13/2021 300NG/ML   Final       Assessment/Plan   Diagnoses and all orders for this visit:    1. Personality disorder, unspecified (CMS/HCC) (Primary)  -     ARIPiprazole (ABILIFY) 5 MG tablet; Take 1 tablet by mouth Daily.  Dispense: 30 tablet; Refill: 0  -     FLUoxetine (PROzac) 20 MG capsule; Take 3 capsules by mouth Daily.  Dispense: 90 capsule; Refill: 0    2. Mood disorder (CMS/HCC)  -     ARIPiprazole (ABILIFY) 5 MG tablet; Take 1 tablet by mouth Daily.  Dispense: 30 tablet; Refill: 0  -     FLUoxetine (PROzac) 20 MG capsule; Take 3 capsules by mouth Daily.  Dispense: 90 capsule; Refill: 0    3. Generalized anxiety disorder  -     ARIPiprazole (ABILIFY) 5 MG tablet; Take 1 tablet by mouth Daily.  Dispense: 30 tablet; Refill: 0  -     FLUoxetine (PROzac) 20 MG capsule; Take 3 capsules by mouth Daily.  Dispense: 90 capsule; Refill: 0  -     hydrOXYzine (ATARAX) 10 MG tablet; Take 1 tablet by mouth 2 (Two) Times a Day As Needed for Anxiety.  Dispense: 60 tablet; Refill: 0  -     traZODone (DESYREL) 50 MG tablet; Take  1 tablet by mouth Every Night.  Dispense: 30 tablet; Refill: 0    4. Medication management    5. Other insomnia  -     traZODone (DESYREL) 50 MG tablet; Take 1 tablet by mouth Every Night.  Dispense: 30 tablet; Refill: 0    6. Severe episode of recurrent major depressive disorder, with psychotic features (CMS/HCC)  -     ARIPiprazole (ABILIFY) 5 MG tablet; Take 1 tablet by mouth Daily.  Dispense: 30 tablet; Refill: 0  -     FLUoxetine (PROzac) 20 MG capsule; Take 3 capsules by mouth Daily.  Dispense: 90 capsule; Refill: 0    7. PTSD (post-traumatic stress disorder)        -Continue aripiprazole 5 mg daily for paranoia and AVH. Lengthy discussion with patient on the possible side effects of antipsychotic medications including increased cholesterol, increased blood sugar, and possibility of weight gain.  Also discussed the need to monitor lab work associated with this.  The risk of muscle movement disorders with this class of medication was also discussed. Although patient's A1c is elevated, benefits outweigh the risk considering AVH often leads to his suicidal thoughts  -Continue fluoxetine 60 mg daily for anxiety and depression  -Continue hydroxyzine 10 mg twice daily as needed for anxiety  -Continue trazodone 50 mg nightly for sleep  -Encouraged patient to continue psychotherapy  -We discussed sleep hygiene including going to bed at the same time and getting up at the same time every day, decreased caffeine consumption, going to bed early enough to get 7 or 8 hours in bed, reading and relaxing before bedtime, and avoiding the TV, computer, phone, iPad close to bedtime.  -Encouraged patient if suicidal thoughts return to call office or visit nearest ED as soon as possible, patient verbalized understanding  -YADIEL reviewed and appropriate. Patient counseled on use of controlled substances.   -The benefits of a healthy diet and exercise were discussed with patient, especially the positive effects they have on mental  health. Patient encouraged to consider lifestyle modification regarding  diet and exercise patterns to maximize results of mental health treatment.  -Reviewed previous available documentation  -Reviewed most recent available labs           Visit Diagnoses:    ICD-10-CM ICD-9-CM   1. Personality disorder, unspecified (CMS/[facility])  F60.9 301.9   2. Mood disorder (CMS/[facility])  F39 296.90   3. Generalized anxiety disorder  F41.1 300.02   4. Medication management  Z79.899 V58.69   5. Other insomnia  G47.09 780.52   6. Severe episode of recurrent major depressive disorder, with psychotic features (CMS/[facility])  F33.3 296.34   7. PTSD (post-traumatic stress disorder)  F43.10 309.81         TREATMENT PLAN/GOALS: Continue supportive psychotherapy efforts and medications as indicated. Treatment and medication options discussed during today's visit. Patient acknowledged and verbally consented to continue with current treatment plan and was educated on the importance of compliance with treatment and follow-up appointments.    MEDICATION ISSUES:    Discussed medication options and treatment plan of prescribed medication as well as the risks, benefits, and side effects including potential falls, possible impaired driving and metabolic adversities among others. Patient is agreeable to call the office with any worsening of symptoms or onset of side effects. Patient is agreeable to call 911 or go to the nearest ER should he/she begin having SI/HI.     MEDS ORDERED DURING VISIT:  New Medications Ordered This Visit   Medications   • ARIPiprazole (ABILIFY) 5 MG tablet     Sig: Take 1 tablet by mouth Daily.     Dispense:  30 tablet     Refill:  0   • FLUoxetine (PROzac) 20 MG capsule     Sig: Take 3 capsules by mouth Daily.     Dispense:  90 capsule     Refill:  0   • hydrOXYzine (ATARAX) 10 MG tablet     Sig: Take 1 tablet by mouth 2 (Two) Times a Day As Needed for Anxiety.     Dispense:  60 tablet     Refill:  0   • traZODone (DESYREL) 50 MG  tablet     Sig: Take 1 tablet by mouth Every Night.     Dispense:  30 tablet     Refill:  0       Return in about 4 weeks (around 7/6/2021).         Prognosis: Guarded dependent on medication/follow up and treatment plan compliance.  Functionality: pt showing improvements in important areas of daily functioning.     Short-term goals: Patient will adhere to medication regimen and note continued improvement in symptoms over the next 3 months.   Long-term goals: Patient will be adherent to medication management and psychotherapy with continued improvement in symptoms over the next 6 months          This document has been electronically signed by RUPINDER Gann   June 8, 2021 15:21 EDT    Part of this note may be an electronic transcription/translation of spoken language to printed text using the Dragon Dictation System.

## 2021-06-22 ENCOUNTER — HOSPITAL ENCOUNTER (EMERGENCY)
Facility: HOSPITAL | Age: 51
Discharge: HOME | End: 2021-06-23
Attending: EMERGENCY MEDICINE | Admitting: EMERGENCY MEDICINE

## 2021-06-22 ENCOUNTER — APPOINTMENT (OUTPATIENT)
Dept: CT IMAGING | Facility: HOSPITAL | Age: 51
End: 2021-06-22

## 2021-06-22 ENCOUNTER — APPOINTMENT (OUTPATIENT)
Dept: GENERAL RADIOLOGY | Facility: HOSPITAL | Age: 51
End: 2021-06-22

## 2021-06-22 DIAGNOSIS — H53.8 BLURRED VISION, RIGHT EYE: Primary | ICD-10-CM

## 2021-06-22 LAB
6-ACETYL MORPHINE: NEGATIVE
ALBUMIN SERPL-MCNC: 3.99 G/DL (ref 3.5–5.2)
ALBUMIN/GLOB SERPL: 1.3 G/DL
ALP SERPL-CCNC: 105 U/L (ref 39–117)
ALT SERPL W P-5'-P-CCNC: 39 U/L (ref 1–41)
AMPHET+METHAMPHET UR QL: NEGATIVE
ANION GAP SERPL CALCULATED.3IONS-SCNC: 9.5 MMOL/L (ref 5–15)
AST SERPL-CCNC: 25 U/L (ref 1–40)
B PARAPERT DNA SPEC QL NAA+PROBE: NOT DETECTED
B PERT DNA SPEC QL NAA+PROBE: NOT DETECTED
BARBITURATES UR QL SCN: NEGATIVE
BASOPHILS # BLD AUTO: 0.06 10*3/MM3 (ref 0–0.2)
BASOPHILS NFR BLD AUTO: 0.7 % (ref 0–1.5)
BENZODIAZ UR QL SCN: NEGATIVE
BILIRUB SERPL-MCNC: 0.3 MG/DL (ref 0–1.2)
BILIRUB UR QL STRIP: NEGATIVE
BUN SERPL-MCNC: 12 MG/DL (ref 6–20)
BUN/CREAT SERPL: 15.2 (ref 7–25)
BUPRENORPHINE SERPL-MCNC: NEGATIVE NG/ML
C PNEUM DNA NPH QL NAA+NON-PROBE: NOT DETECTED
CALCIUM SPEC-SCNC: 9.3 MG/DL (ref 8.6–10.5)
CANNABINOIDS SERPL QL: NEGATIVE
CHLORIDE SERPL-SCNC: 102 MMOL/L (ref 98–107)
CLARITY UR: CLEAR
CO2 SERPL-SCNC: 27.5 MMOL/L (ref 22–29)
COCAINE UR QL: NEGATIVE
COLOR UR: YELLOW
CREAT SERPL-MCNC: 0.79 MG/DL (ref 0.76–1.27)
CRP SERPL-MCNC: 0.3 MG/DL (ref 0–0.5)
DEPRECATED RDW RBC AUTO: 39.3 FL (ref 37–54)
EOSINOPHIL # BLD AUTO: 0.22 10*3/MM3 (ref 0–0.4)
EOSINOPHIL NFR BLD AUTO: 2.6 % (ref 0.3–6.2)
ERYTHROCYTE [DISTWIDTH] IN BLOOD BY AUTOMATED COUNT: 12.5 % (ref 12.3–15.4)
FLUAV SUBTYP SPEC NAA+PROBE: NOT DETECTED
FLUBV RNA ISLT QL NAA+PROBE: NOT DETECTED
GFR SERPL CREATININE-BSD FRML MDRD: 103 ML/MIN/1.73
GLOBULIN UR ELPH-MCNC: 3 GM/DL
GLUCOSE SERPL-MCNC: 230 MG/DL (ref 65–99)
GLUCOSE UR STRIP-MCNC: ABNORMAL MG/DL
HADV DNA SPEC NAA+PROBE: NOT DETECTED
HCOV 229E RNA SPEC QL NAA+PROBE: NOT DETECTED
HCOV HKU1 RNA SPEC QL NAA+PROBE: NOT DETECTED
HCOV NL63 RNA SPEC QL NAA+PROBE: NOT DETECTED
HCOV OC43 RNA SPEC QL NAA+PROBE: NOT DETECTED
HCT VFR BLD AUTO: 46.8 % (ref 37.5–51)
HGB BLD-MCNC: 15.8 G/DL (ref 13–17.7)
HGB UR QL STRIP.AUTO: NEGATIVE
HMPV RNA NPH QL NAA+NON-PROBE: NOT DETECTED
HOLD SPECIMEN: NORMAL
HOLD SPECIMEN: NORMAL
HPIV1 RNA SPEC QL NAA+PROBE: NOT DETECTED
HPIV2 RNA SPEC QL NAA+PROBE: NOT DETECTED
HPIV3 RNA NPH QL NAA+PROBE: NOT DETECTED
HPIV4 P GENE NPH QL NAA+PROBE: NOT DETECTED
IMM GRANULOCYTES # BLD AUTO: 0.02 10*3/MM3 (ref 0–0.05)
IMM GRANULOCYTES NFR BLD AUTO: 0.2 % (ref 0–0.5)
KETONES UR QL STRIP: NEGATIVE
LEUKOCYTE ESTERASE UR QL STRIP.AUTO: NEGATIVE
LYMPHOCYTES # BLD AUTO: 3.02 10*3/MM3 (ref 0.7–3.1)
LYMPHOCYTES NFR BLD AUTO: 36.1 % (ref 19.6–45.3)
M PNEUMO IGG SER IA-ACNC: NOT DETECTED
MAGNESIUM SERPL-MCNC: 2.1 MG/DL (ref 1.6–2.6)
MCH RBC QN AUTO: 29.3 PG (ref 26.6–33)
MCHC RBC AUTO-ENTMCNC: 33.8 G/DL (ref 31.5–35.7)
MCV RBC AUTO: 86.8 FL (ref 79–97)
METHADONE UR QL SCN: NEGATIVE
MONOCYTES # BLD AUTO: 0.66 10*3/MM3 (ref 0.1–0.9)
MONOCYTES NFR BLD AUTO: 7.9 % (ref 5–12)
NEUTROPHILS NFR BLD AUTO: 4.38 10*3/MM3 (ref 1.7–7)
NEUTROPHILS NFR BLD AUTO: 52.5 % (ref 42.7–76)
NITRITE UR QL STRIP: NEGATIVE
NRBC BLD AUTO-RTO: 0 /100 WBC (ref 0–0.2)
OPIATES UR QL: NEGATIVE
OXYCODONE UR QL SCN: NEGATIVE
PCP UR QL SCN: NEGATIVE
PH UR STRIP.AUTO: <=5 [PH] (ref 5–8)
PLATELET # BLD AUTO: 239 10*3/MM3 (ref 140–450)
PMV BLD AUTO: 11.3 FL (ref 6–12)
POTASSIUM SERPL-SCNC: 3.9 MMOL/L (ref 3.5–5.2)
PROT SERPL-MCNC: 7 G/DL (ref 6–8.5)
PROT UR QL STRIP: NEGATIVE
RBC # BLD AUTO: 5.39 10*6/MM3 (ref 4.14–5.8)
RHINOVIRUS RNA SPEC NAA+PROBE: NOT DETECTED
RSV RNA NPH QL NAA+NON-PROBE: NOT DETECTED
SARS-COV-2 RNA NPH QL NAA+NON-PROBE: NOT DETECTED
SODIUM SERPL-SCNC: 139 MMOL/L (ref 136–145)
SP GR UR STRIP: >=1.03 (ref 1–1.03)
T4 FREE SERPL-MCNC: 1.04 NG/DL (ref 0.93–1.7)
TROPONIN T SERPL-MCNC: <0.01 NG/ML (ref 0–0.03)
TSH SERPL DL<=0.05 MIU/L-ACNC: 2.25 UIU/ML (ref 0.27–4.2)
UROBILINOGEN UR QL STRIP: ABNORMAL
WBC # BLD AUTO: 8.36 10*3/MM3 (ref 3.4–10.8)
WHOLE BLOOD HOLD SPECIMEN: NORMAL

## 2021-06-22 PROCEDURE — 85025 COMPLETE CBC W/AUTO DIFF WBC: CPT | Performed by: EMERGENCY MEDICINE

## 2021-06-22 PROCEDURE — 80053 COMPREHEN METABOLIC PANEL: CPT | Performed by: EMERGENCY MEDICINE

## 2021-06-22 PROCEDURE — 70450 CT HEAD/BRAIN W/O DYE: CPT

## 2021-06-22 PROCEDURE — 80307 DRUG TEST PRSMV CHEM ANLYZR: CPT | Performed by: EMERGENCY MEDICINE

## 2021-06-22 PROCEDURE — 86140 C-REACTIVE PROTEIN: CPT | Performed by: EMERGENCY MEDICINE

## 2021-06-22 PROCEDURE — 0202U NFCT DS 22 TRGT SARS-COV-2: CPT | Performed by: EMERGENCY MEDICINE

## 2021-06-22 PROCEDURE — 93005 ELECTROCARDIOGRAM TRACING: CPT | Performed by: EMERGENCY MEDICINE

## 2021-06-22 PROCEDURE — 99283 EMERGENCY DEPT VISIT LOW MDM: CPT

## 2021-06-22 PROCEDURE — 84443 ASSAY THYROID STIM HORMONE: CPT | Performed by: EMERGENCY MEDICINE

## 2021-06-22 PROCEDURE — 84439 ASSAY OF FREE THYROXINE: CPT | Performed by: EMERGENCY MEDICINE

## 2021-06-22 PROCEDURE — 71045 X-RAY EXAM CHEST 1 VIEW: CPT | Performed by: RADIOLOGY

## 2021-06-22 PROCEDURE — 81003 URINALYSIS AUTO W/O SCOPE: CPT | Performed by: EMERGENCY MEDICINE

## 2021-06-22 PROCEDURE — 99284 EMERGENCY DEPT VISIT MOD MDM: CPT

## 2021-06-22 PROCEDURE — 93010 ELECTROCARDIOGRAM REPORT: CPT | Performed by: INTERNAL MEDICINE

## 2021-06-22 PROCEDURE — 71045 X-RAY EXAM CHEST 1 VIEW: CPT

## 2021-06-22 PROCEDURE — 84484 ASSAY OF TROPONIN QUANT: CPT | Performed by: EMERGENCY MEDICINE

## 2021-06-22 PROCEDURE — 83735 ASSAY OF MAGNESIUM: CPT | Performed by: EMERGENCY MEDICINE

## 2021-06-22 RX ORDER — SODIUM CHLORIDE 9 MG/ML
125 INJECTION, SOLUTION INTRAVENOUS CONTINUOUS
Status: DISCONTINUED | OUTPATIENT
Start: 2021-06-22 | End: 2021-06-23 | Stop reason: HOSPADM

## 2021-06-22 RX ADMIN — SODIUM CHLORIDE 500 ML: 9 INJECTION, SOLUTION INTRAVENOUS at 20:36

## 2021-06-22 RX ADMIN — SODIUM CHLORIDE 125 ML/HR: 9 INJECTION, SOLUTION INTRAVENOUS at 20:47

## 2021-06-23 VITALS
BODY MASS INDEX: 30.8 KG/M2 | DIASTOLIC BLOOD PRESSURE: 95 MMHG | SYSTOLIC BLOOD PRESSURE: 135 MMHG | RESPIRATION RATE: 19 BRPM | HEIGHT: 74 IN | TEMPERATURE: 98.2 F | HEART RATE: 85 BPM | WEIGHT: 240 LBS | OXYGEN SATURATION: 98 %

## 2021-06-23 PROCEDURE — 96361 HYDRATE IV INFUSION ADD-ON: CPT

## 2021-06-23 PROCEDURE — 96360 HYDRATION IV INFUSION INIT: CPT

## 2021-06-24 ENCOUNTER — OFFICE VISIT (OUTPATIENT)
Dept: FAMILY MEDICINE CLINIC | Facility: CLINIC | Age: 51
End: 2021-06-24

## 2021-06-24 VITALS
DIASTOLIC BLOOD PRESSURE: 86 MMHG | HEIGHT: 74 IN | TEMPERATURE: 97.1 F | OXYGEN SATURATION: 99 % | HEART RATE: 83 BPM | SYSTOLIC BLOOD PRESSURE: 136 MMHG | WEIGHT: 245 LBS | BODY MASS INDEX: 31.44 KG/M2

## 2021-06-24 DIAGNOSIS — J30.2 SEASONAL ALLERGIES: ICD-10-CM

## 2021-06-24 DIAGNOSIS — E11.65 TYPE 2 DIABETES MELLITUS WITH HYPERGLYCEMIA, WITHOUT LONG-TERM CURRENT USE OF INSULIN (HCC): ICD-10-CM

## 2021-06-24 DIAGNOSIS — E11.9 COMPREHENSIVE DIABETIC FOOT EXAMINATION, TYPE 2 DM, ENCOUNTER FOR (HCC): ICD-10-CM

## 2021-06-24 DIAGNOSIS — E11.65 UNCONTROLLED TYPE 2 DIABETES MELLITUS WITH HYPERGLYCEMIA (HCC): Primary | ICD-10-CM

## 2021-06-24 LAB
QT INTERVAL: 412 MS
QTC INTERVAL: 466 MS

## 2021-06-24 PROCEDURE — 99214 OFFICE O/P EST MOD 30 MIN: CPT | Performed by: FAMILY MEDICINE

## 2021-06-24 RX ORDER — GLIPIZIDE AND METFORMIN HCL 5; 500 MG/1; MG/1
1 TABLET, FILM COATED ORAL
Qty: 180 TABLET | Refills: 0 | Status: SHIPPED | OUTPATIENT
Start: 2021-06-24 | End: 2021-08-26 | Stop reason: SDUPTHER

## 2021-06-24 RX ORDER — PIOGLITAZONEHYDROCHLORIDE 15 MG/1
15 TABLET ORAL DAILY
Qty: 90 TABLET | Refills: 1 | Status: SHIPPED | OUTPATIENT
Start: 2021-06-24 | End: 2021-11-23 | Stop reason: SDUPTHER

## 2021-06-24 RX ORDER — LANCETS 28 GAUGE
EACH MISCELLANEOUS
Qty: 200 EACH | Refills: 12 | Status: SHIPPED | OUTPATIENT
Start: 2021-06-24 | End: 2022-02-23 | Stop reason: SDUPTHER

## 2021-06-24 RX ORDER — LISINOPRIL 5 MG/1
5 TABLET ORAL DAILY
Qty: 90 TABLET | Refills: 1 | Status: SHIPPED | OUTPATIENT
Start: 2021-06-24 | End: 2021-11-23 | Stop reason: SDUPTHER

## 2021-06-24 RX ORDER — ATORVASTATIN CALCIUM 40 MG/1
40 TABLET, FILM COATED ORAL NIGHTLY
Qty: 90 TABLET | Refills: 1 | Status: SHIPPED | OUTPATIENT
Start: 2021-06-24 | End: 2021-11-23 | Stop reason: SDUPTHER

## 2021-06-24 RX ORDER — LORATADINE 10 MG/1
10 TABLET ORAL DAILY
Qty: 90 TABLET | Refills: 3 | Status: SHIPPED | OUTPATIENT
Start: 2021-06-24 | End: 2022-02-23 | Stop reason: SDUPTHER

## 2021-06-24 NOTE — PROGRESS NOTES
Subjective   Jamarcus Allison is a 51 y.o. male.   Pt presents today with CC of Diabetes      History of Present Illness   Patient is a 51-year-old male with uncontrolled diabetes.  He takes psychiatric medicines that make losing weight more difficult.  He has been walking and exercising, but he has had difficulty losing weight.  He currently takes Actos 15 mg daily, and glipizide 5 mg twice daily.  He had an intolerance to Metformin in the past as it would cause GI upset and loose bowel movements at high doses.  He is unsure if he tried lower doses and still had similar problems.  He is agreeable to trying a lower dose of Metformin.  #2 he has hyperlipidemia and hypertension associated with diabetes.  His blood pressure has been well controlled at home.  He was recently in the emergency room for visual changes, he has a follow-up with eye doctor.  He is not having any further eye concerns.       The following portions of the patient's history were reviewed and updated as appropriate: allergies, current medications, past family history, past medical history, past social history, past surgical history and problem list.    Review of Systems   Constitutional: Negative for chills, fever and unexpected weight loss.   HENT: Negative for congestion and sore throat.    Eyes: Negative for blurred vision and visual disturbance.   Respiratory: Negative for cough and wheezing.    Cardiovascular: Negative for chest pain and palpitations.   Gastrointestinal: Negative for abdominal pain and diarrhea.   Endocrine: Negative for cold intolerance and heat intolerance.   Genitourinary: Negative for dysuria.   Musculoskeletal: Negative for arthralgias and neck stiffness.   Neurological: Negative for dizziness, seizures and syncope.   Psychiatric/Behavioral: Negative for self-injury, suicidal ideas and depressed mood.       Objective   Physical Exam  Vitals and nursing note reviewed.   Constitutional:       Appearance: He is  well-developed.   HENT:      Head: Normocephalic and atraumatic.      Right Ear: External ear normal.      Left Ear: External ear normal.      Nose: Nose normal.   Eyes:      Conjunctiva/sclera: Conjunctivae normal.      Pupils: Pupils are equal, round, and reactive to light.   Cardiovascular:      Rate and Rhythm: Normal rate and regular rhythm.      Pulses:           Dorsalis pedis pulses are 2+ on the right side and 2+ on the left side.        Posterior tibial pulses are 2+ on the right side and 2+ on the left side.      Heart sounds: Normal heart sounds.   Pulmonary:      Effort: Pulmonary effort is normal.      Breath sounds: Normal breath sounds.   Abdominal:      General: Bowel sounds are normal.      Palpations: Abdomen is soft.   Musculoskeletal:      Cervical back: Normal range of motion and neck supple.      Right foot: Normal range of motion. No deformity, bunion, Charcot foot, foot drop or prominent metatarsal heads.      Left foot: Normal range of motion. No deformity, bunion, Charcot foot, foot drop or prominent metatarsal heads.   Feet:      Right foot:      Protective Sensation: 10 sites tested. 10 sites sensed.      Left foot:      Protective Sensation: 10 sites tested. 10 sites sensed.      Comments: Normal foot exam.  No concerns  Skin:     General: Skin is warm and dry.   Neurological:      Mental Status: He is alert and oriented to person, place, and time.   Psychiatric:         Behavior: Behavior normal.           Assessment/Plan   Diagnoses and all orders for this visit:    1. Uncontrolled type 2 diabetes mellitus with hyperglycemia (CMS/HCC) (Primary)  His last A1c was greater than 12.  His blood sugar was elevated in the emergency room suggesting that his control is not much better.  Had his long discussion with him about the need for more treatment.  He is exercising and he is avoiding weight gain, he takes psychiatric medicines known for weight gain, and he is not gaining, this is good on  its own.  However, the risks of uncontrolled diabetes over long periods of time was discussed and he was agreeable to work harder.  He had missed a few doses of Actos, so I avoided increasing this medication, will retrial Metformin as part of a combo pill metaglip, as he has plenty of glipizide left, recommended taking the combo pill once in the morning, and taking glipizide alone in the evening for the first 3 to 7 days to ensure that he is tolerating before going to twice daily dosing.  2. Comprehensive diabetic foot examination, type 2 DM, encounter for (CMS/[facility])  No problems on foot exam today.  3. Type 2 diabetes mellitus with hyperglycemia, without long-term current use of insulin (CMS/[facility])  -     Lancets (freestyle) lancets; Check daily fasting  Dispense: 200 each; Refill: 12  -     glucose blood test strip; Check daily  Dispense: 200 each; Refill: 12  -     glipiZIDE-metFORMIN (METAGLIP) 5-500 MG per tablet; Take 1 tablet by mouth 2 (Two) Times a Day Before Meals.  Dispense: 180 tablet; Refill: 0  -     pioglitazone (ACTOS) 15 MG tablet; Take 1 tablet by mouth Daily.  Dispense: 90 tablet; Refill: 1  -     atorvastatin (LIPITOR) 40 MG tablet; Take 1 tablet by mouth Every Night.  Dispense: 90 tablet; Refill: 1  -     lisinopril (PRINIVIL,ZESTRIL) 5 MG tablet; Take 1 tablet by mouth Daily.  Dispense: 90 tablet; Refill: 1    4. Seasonal allergies  -     loratadine (CLARITIN) 10 MG tablet; Take 1 tablet by mouth Daily.  Dispense: 90 tablet; Refill: 3                 Patient's Body mass index is 31.46 kg/m². indicating that he is obese (BMI >30). Obesity-related health conditions include the following: diabetes mellitus. Obesity is unchanged. BMI is is above average; BMI management plan is completed. We discussed portion control and increasing exercise..

## 2021-07-06 ENCOUNTER — OFFICE VISIT (OUTPATIENT)
Dept: PSYCHIATRY | Facility: CLINIC | Age: 51
End: 2021-07-06

## 2021-07-06 VITALS
BODY MASS INDEX: 31.78 KG/M2 | HEART RATE: 70 BPM | SYSTOLIC BLOOD PRESSURE: 119 MMHG | WEIGHT: 247.6 LBS | HEIGHT: 74 IN | DIASTOLIC BLOOD PRESSURE: 77 MMHG

## 2021-07-06 DIAGNOSIS — G47.09 OTHER INSOMNIA: ICD-10-CM

## 2021-07-06 DIAGNOSIS — Z79.899 MEDICATION MANAGEMENT: ICD-10-CM

## 2021-07-06 DIAGNOSIS — F60.9 PERSONALITY DISORDER, UNSPECIFIED (HCC): ICD-10-CM

## 2021-07-06 DIAGNOSIS — F39 MOOD DISORDER (HCC): Primary | ICD-10-CM

## 2021-07-06 DIAGNOSIS — F33.3 SEVERE EPISODE OF RECURRENT MAJOR DEPRESSIVE DISORDER, WITH PSYCHOTIC FEATURES (HCC): ICD-10-CM

## 2021-07-06 DIAGNOSIS — F43.10 PTSD (POST-TRAUMATIC STRESS DISORDER): ICD-10-CM

## 2021-07-06 DIAGNOSIS — F41.1 GENERALIZED ANXIETY DISORDER: ICD-10-CM

## 2021-07-06 PROCEDURE — 99214 OFFICE O/P EST MOD 30 MIN: CPT | Performed by: NURSE PRACTITIONER

## 2021-07-06 RX ORDER — FLUOXETINE HYDROCHLORIDE 20 MG/1
60 CAPSULE ORAL DAILY
Qty: 90 CAPSULE | Refills: 0 | Status: SHIPPED | OUTPATIENT
Start: 2021-07-06 | End: 2021-08-03 | Stop reason: SDUPTHER

## 2021-07-06 RX ORDER — HYDROXYZINE HYDROCHLORIDE 10 MG/1
10 TABLET, FILM COATED ORAL 2 TIMES DAILY PRN
Qty: 60 TABLET | Refills: 0 | Status: SHIPPED | OUTPATIENT
Start: 2021-07-06 | End: 2021-08-03 | Stop reason: SDUPTHER

## 2021-07-06 RX ORDER — ARIPIPRAZOLE 10 MG/1
10 TABLET ORAL DAILY
Qty: 30 TABLET | Refills: 0 | Status: SHIPPED | OUTPATIENT
Start: 2021-07-06 | End: 2021-08-03 | Stop reason: SDUPTHER

## 2021-07-06 RX ORDER — TRAZODONE HYDROCHLORIDE 50 MG/1
50 TABLET ORAL NIGHTLY
Qty: 30 TABLET | Refills: 0 | Status: SHIPPED | OUTPATIENT
Start: 2021-07-06 | End: 2021-08-03 | Stop reason: SDUPTHER

## 2021-07-06 NOTE — PROGRESS NOTES
"Subjective   Jamarcus Allison is a 51 y.o. male who presents today for follow up    Chief Complaint:  Irritability, AVH    History of Present Illness: Patient presents as follow-up.  He reports improvement with depression and irritability.  States he does continue to struggle with loud noises.  Reports he has not been drawing as much as he does not have a desire, states he feels \"numb\".  States he has been driving his sister to and from multiple doctor appointments and this could be the reason for his lack of inspiration.  He continues to hear voices, states last week he was asleep, hard someone yelled his name and he felt someone touch his knee, states this is the first time he has felt a touch.  States that his last visit with his therapist she informed him his eyes did not look \"normal\" and he does walk was different, she encouraged him to go to ED for further evaluation.  States he was cleared in the ED and was advised to see an optometrist.  States at his last appointment with optometry he was informed that he had a cataract forming.  Reports sleep has improved, denies nightmares. Reports appetite is good, denies any weight changes.  He reports continuing suicidal thoughts, states he is able to redirect himself.  He adamantly convincingly denies intent.  He denies HI.    The following portions of the patient's history were reviewed and updated as appropriate: allergies, current medications, past family history, past medical history, past social history, past surgical history and problem list.      Past Medical History:  Past Medical History:   Diagnosis Date   • Depression    • Diabetes mellitus (CMS/HCC)    • Diabetic neuropathy (CMS/HCC)    • IBS (irritable bowel syndrome)    • Peripheral artery disease (CMS/HCC)    • PTSD (post-traumatic stress disorder)    • Schizoid personality (CMS/HCC)        Social History:  Social History     Socioeconomic History   • Marital status: Single     Spouse name: Not on file "   • Number of children: Not on file   • Years of education: Not on file   • Highest education level: Not on file   Tobacco Use   • Smoking status: Never Smoker   • Smokeless tobacco: Never Used   Vaping Use   • Vaping Use: Never used   Substance and Sexual Activity   • Alcohol use: Never   • Drug use: Never   • Sexual activity: Defer       Family History:  Family History   Problem Relation Age of Onset   • Heart disease Mother    • Diabetes Mother    • Cancer Father    • Alcohol abuse Father    • Heart disease Sister    • Diabetes Sister    • Cancer Sister    • Thyroid disease Sister    • Depression Sister    • Anxiety disorder Sister    • Depression Brother    • Anxiety disorder Brother    • Alcohol abuse Brother        Past Surgical History:  Past Surgical History:   Procedure Laterality Date   • POLYPECTOMY         Problem List:  Patient Active Problem List   Diagnosis   • Type 2 diabetes mellitus with hyperglycemia, without long-term current use of insulin (CMS/[facility])       Allergy:   Allergies   Allergen Reactions   • Poison Ivy Extract Anaphylaxis   • Poison Oak Extract Anaphylaxis   • Sumac Anaphylaxis        Current Medications:   Current Outpatient Medications   Medication Sig Dispense Refill   • ARIPiprazole (ABILIFY) 10 MG tablet Take 1 tablet by mouth Daily. 30 tablet 0   • atorvastatin (LIPITOR) 40 MG tablet Take 1 tablet by mouth Every Night. 90 tablet 1   • Blood Glucose Monitoring Suppl ([employer] LOGIC BLOOD GLUCOSE) w/Device kit 1 Device Daily. 1 each 0   • FLUoxetine (PROzac) 20 MG capsule Take 3 capsules by mouth Daily. 90 capsule 0   • glipiZIDE-metFORMIN (METAGLIP) 5-500 MG per tablet Take 1 tablet by mouth 2 (Two) Times a Day Before Meals. 180 tablet 0   • glucose blood test strip Check daily 200 each 12   • hydrOXYzine (ATARAX) 10 MG tablet Take 1 tablet by mouth 2 (Two) Times a Day As Needed for Anxiety. 60 tablet 0   • Lancets (freestyle) lancets Check daily fasting 200 each 12   • lisinopril  "(PRINIVIL,ZESTRIL) 5 MG tablet Take 1 tablet by mouth Daily. 90 tablet 1   • loratadine (CLARITIN) 10 MG tablet Take 1 tablet by mouth Daily. 90 tablet 3   • pioglitazone (ACTOS) 15 MG tablet Take 1 tablet by mouth Daily. 90 tablet 1   • traZODone (DESYREL) 50 MG tablet Take 1 tablet by mouth Every Night. 30 tablet 0     No current facility-administered medications for this visit.       Review of Symptoms:    Review of Systems   Constitutional: Negative.    HENT: Negative.    Eyes: Positive for blurred vision.   Respiratory: Negative.    Cardiovascular: Negative.    Gastrointestinal: Negative.    Endocrine: Negative.    Genitourinary: Negative.    Musculoskeletal: Negative.    Skin: Negative.    Neurological: Negative.    Hematological: Negative.    Psychiatric/Behavioral: Positive for agitation, hallucinations, depressed mood and stress. Negative for self-injury and suicidal ideas. The patient is nervous/anxious.        Objective   Physical Exam:   Blood pressure 119/77, pulse 70, height 188 cm (74.02\"), weight 112 kg (247 lb 9.6 oz).  Body mass index is 31.78 kg/m².    Appearance: Obese male, appropriately dressed, appears stated age and in no acute distress  Gait, Station, Strength: Within normal limits    Mental Status Exam:   Hygiene:   good  Cooperation:  Cooperative  Eye Contact:  Good  Psychomotor Behavior:  Appropriate  Affect:  Blunted  Mood: anxious  Hopelessness: 6  Speech:  Normal  Thought Process:  Goal directed and Linear  Thought Content:  Normal and Mood congruent  Suicidal:  None  Homicidal:  None  Hallucinations:  Auditory and Visual  Delusion:  Paranoid  Memory:  Intact  Orientation:  Person, Place, Time and Situation  Reliability:  good  Insight:  Fair  Judgement:  Fair  Impulse Control:  Fair  Physical/Medical Issues:  Yes Chronic health issues, nothing acute today     PHQ-Score Total:  PHQ-9 Total Score: 22   Patient screened positive for depression based on a PHQ-9 score of 22 on 7/6/2021. " Follow-up recommendations include: Prescribed antidepressant medication treatment and Suicide Risk Assessment performed.        Lab Results:   Admission on 06/22/2021, Discharged on 06/23/2021   Component Date Value Ref Range Status   • Glucose 06/22/2021 230* 65 - 99 mg/dL Final   • BUN 06/22/2021 12  6 - 20 mg/dL Final   • Creatinine 06/22/2021 0.79  0.76 - 1.27 mg/dL Final   • Sodium 06/22/2021 139  136 - 145 mmol/L Final   • Potassium 06/22/2021 3.9  3.5 - 5.2 mmol/L Final   • Chloride 06/22/2021 102  98 - 107 mmol/L Final   • CO2 06/22/2021 27.5  22.0 - 29.0 mmol/L Final   • Calcium 06/22/2021 9.3  8.6 - 10.5 mg/dL Final   • Total Protein 06/22/2021 7.0  6.0 - 8.5 g/dL Final   • Albumin 06/22/2021 3.99  3.50 - 5.20 g/dL Final   • ALT (SGPT) 06/22/2021 39  1 - 41 U/L Final   • AST (SGOT) 06/22/2021 25  1 - 40 U/L Final   • Alkaline Phosphatase 06/22/2021 105  39 - 117 U/L Final   • Total Bilirubin 06/22/2021 0.3  0.0 - 1.2 mg/dL Final   • eGFR Non African Amer 06/22/2021 103  >60 mL/min/1.73 Final   • Globulin 06/22/2021 3.0  gm/dL Final   • A/G Ratio 06/22/2021 1.3  g/dL Final   • BUN/Creatinine Ratio 06/22/2021 15.2  7.0 - 25.0 Final   • Anion Gap 06/22/2021 9.5  5.0 - 15.0 mmol/L Final   • Color, UA 06/22/2021 Yellow  Yellow, Straw Final   • Appearance, UA 06/22/2021 Clear  Clear Final   • pH, UA 06/22/2021 <=5.0  5.0 - 8.0 Final   • Specific Gravity, UA 06/22/2021 >=1.030  1.005 - 1.030 Final   • Glucose, UA 06/22/2021 >=1000 mg/dL (3+)* Negative Final   • Ketones, UA 06/22/2021 Negative  Negative Final   • Bilirubin, UA 06/22/2021 Negative  Negative Final   • Blood, UA 06/22/2021 Negative  Negative Final   • Protein, UA 06/22/2021 Negative  Negative Final   • Leuk Esterase, UA 06/22/2021 Negative  Negative Final   • Nitrite, UA 06/22/2021 Negative  Negative Final   • Urobilinogen, UA 06/22/2021 1.0 E.U./dL  0.2 - 1.0 E.U./dL Final   • Troponin T 06/22/2021 <0.010  0.000 - 0.030 ng/mL Final   • Free T4  06/22/2021 1.04  0.93 - 1.70 ng/dL Final   • TSH 06/22/2021 2.250  0.270 - 4.200 uIU/mL Final   • Magnesium 06/22/2021 2.1  1.6 - 2.6 mg/dL Final   • Amphetamine Screen, Urine 06/22/2021 Negative  Negative Final   • Barbiturates Screen, Urine 06/22/2021 Negative  Negative Final   • Benzodiazepine Screen, Urine 06/22/2021 Negative  Negative Final   • Cocaine Screen, Urine 06/22/2021 Negative  Negative Final   • Methadone Screen, Urine 06/22/2021 Negative  Negative Final   • Opiate Screen 06/22/2021 Negative  Negative Final   • Phencyclidine (PCP), Urine 06/22/2021 Negative  Negative Final   • THC, Screen, Urine 06/22/2021 Negative  Negative Final   • 6-ACETYL MORPHINE 06/22/2021 Negative  Negative Final   • Buprenorphine, Screen, Urine 06/22/2021 Negative  Negative Final   • Oxycodone Screen, Urine 06/22/2021 Negative  Negative Final   • QT Interval 06/22/2021 412  ms Final   • QTC Interval 06/22/2021 466  ms Final   • ADENOVIRUS, PCR 06/22/2021 Not Detected  Not Detected Final   • Coronavirus 229E 06/22/2021 Not Detected  Not Detected Final   • Coronavirus HKU1 06/22/2021 Not Detected  Not Detected Final   • Coronavirus NL63 06/22/2021 Not Detected  Not Detected Final   • Coronavirus OC43 06/22/2021 Not Detected  Not Detected Final   • COVID19 06/22/2021 Not Detected  Not Detected - Ref. Range Final   • Human Metapneumovirus 06/22/2021 Not Detected  Not Detected Final   • Human Rhinovirus/Enterovirus 06/22/2021 Not Detected  Not Detected Final   • Influenza A PCR 06/22/2021 Not Detected  Not Detected Final   • Influenza B PCR 06/22/2021 Not Detected  Not Detected Final   • Parainfluenza Virus 1 06/22/2021 Not Detected  Not Detected Final   • Parainfluenza Virus 2 06/22/2021 Not Detected  Not Detected Final   • Parainfluenza Virus 3 06/22/2021 Not Detected  Not Detected Final   • Parainfluenza Virus 4 06/22/2021 Not Detected  Not Detected Final   • RSV, PCR 06/22/2021 Not Detected  Not Detected Final   • Bordetella  pertussis pcr 06/22/2021 Not Detected  Not Detected Final   • Bordetella parapertussis PCR 06/22/2021 Not Detected  Not Detected Final   • Chlamydophila pneumoniae PCR 06/22/2021 Not Detected  Not Detected Final   • Mycoplasma pneumo by PCR 06/22/2021 Not Detected  Not Detected Final   • C-Reactive Protein 06/22/2021 0.30  0.00 - 0.50 mg/dL Final   • WBC 06/22/2021 8.36  3.40 - 10.80 10*3/mm3 Final   • RBC 06/22/2021 5.39  4.14 - 5.80 10*6/mm3 Final   • Hemoglobin 06/22/2021 15.8  13.0 - 17.7 g/dL Final   • Hematocrit 06/22/2021 46.8  37.5 - 51.0 % Final   • MCV 06/22/2021 86.8  79.0 - 97.0 fL Final   • MCH 06/22/2021 29.3  26.6 - 33.0 pg Final   • MCHC 06/22/2021 33.8  31.5 - 35.7 g/dL Final   • RDW 06/22/2021 12.5  12.3 - 15.4 % Final   • RDW-SD 06/22/2021 39.3  37.0 - 54.0 fl Final   • MPV 06/22/2021 11.3  6.0 - 12.0 fL Final   • Platelets 06/22/2021 239  140 - 450 10*3/mm3 Final   • Neutrophil % 06/22/2021 52.5  42.7 - 76.0 % Final   • Lymphocyte % 06/22/2021 36.1  19.6 - 45.3 % Final   • Monocyte % 06/22/2021 7.9  5.0 - 12.0 % Final   • Eosinophil % 06/22/2021 2.6  0.3 - 6.2 % Final   • Basophil % 06/22/2021 0.7  0.0 - 1.5 % Final   • Immature Grans % 06/22/2021 0.2  0.0 - 0.5 % Final   • Neutrophils, Absolute 06/22/2021 4.38  1.70 - 7.00 10*3/mm3 Final   • Lymphocytes, Absolute 06/22/2021 3.02  0.70 - 3.10 10*3/mm3 Final   • Monocytes, Absolute 06/22/2021 0.66  0.10 - 0.90 10*3/mm3 Final   • Eosinophils, Absolute 06/22/2021 0.22  0.00 - 0.40 10*3/mm3 Final   • Basophils, Absolute 06/22/2021 0.06  0.00 - 0.20 10*3/mm3 Final   • Immature Grans, Absolute 06/22/2021 0.02  0.00 - 0.05 10*3/mm3 Final   • nRBC 06/22/2021 0.0  0.0 - 0.2 /100 WBC Final   • Extra Tube 06/22/2021 Hold for add-ons.   Final    Auto resulted.   • Extra Tube 06/22/2021 hold for add-on   Final    Auto resulted   • Extra Tube 06/22/2021 Hold for add-ons.   Final    Auto resulted.       Assessment/Plan   Diagnoses and all orders for this  visit:    1. Mood disorder (CMS/HCC) (Primary)  -     ARIPiprazole (ABILIFY) 10 MG tablet; Take 1 tablet by mouth Daily.  Dispense: 30 tablet; Refill: 0  -     FLUoxetine (PROzac) 20 MG capsule; Take 3 capsules by mouth Daily.  Dispense: 90 capsule; Refill: 0    2. Medication management    3. Generalized anxiety disorder  -     ARIPiprazole (ABILIFY) 10 MG tablet; Take 1 tablet by mouth Daily.  Dispense: 30 tablet; Refill: 0  -     FLUoxetine (PROzac) 20 MG capsule; Take 3 capsules by mouth Daily.  Dispense: 90 capsule; Refill: 0  -     hydrOXYzine (ATARAX) 10 MG tablet; Take 1 tablet by mouth 2 (Two) Times a Day As Needed for Anxiety.  Dispense: 60 tablet; Refill: 0  -     traZODone (DESYREL) 50 MG tablet; Take 1 tablet by mouth Every Night.  Dispense: 30 tablet; Refill: 0    4. Other insomnia  -     traZODone (DESYREL) 50 MG tablet; Take 1 tablet by mouth Every Night.  Dispense: 30 tablet; Refill: 0    5. Personality disorder, unspecified (CMS/HCC)  -     ARIPiprazole (ABILIFY) 10 MG tablet; Take 1 tablet by mouth Daily.  Dispense: 30 tablet; Refill: 0  -     FLUoxetine (PROzac) 20 MG capsule; Take 3 capsules by mouth Daily.  Dispense: 90 capsule; Refill: 0    6. Severe episode of recurrent major depressive disorder, with psychotic features (CMS/HCC)  -     ARIPiprazole (ABILIFY) 10 MG tablet; Take 1 tablet by mouth Daily.  Dispense: 30 tablet; Refill: 0  -     FLUoxetine (PROzac) 20 MG capsule; Take 3 capsules by mouth Daily.  Dispense: 90 capsule; Refill: 0    7. PTSD (post-traumatic stress disorder)        -Increase aripiprazole 10 mg daily for paranoia and AVH. Lengthy discussion with patient on the possible side effects of antipsychotic medications including increased cholesterol, increased blood sugar, and possibility of weight gain.  Also discussed the need to monitor lab work associated with this.  The risk of muscle movement disorders with this class of medication was also discussed. Although patient's  A1c is elevated, benefits outweigh the risk considering AVH often leads to his suicidal thoughts  -Continue fluoxetine 60 mg daily for anxiety and depression  -Continue hydroxyzine 10 mg twice daily as needed for anxiety  -Continue trazodone 50 mg nightly for sleep  -Encouraged patient to continue psychotherapy  -We discussed sleep hygiene including going to bed at the same time and getting up at the same time every day, decreased caffeine consumption, going to bed early enough to get 7 or 8 hours in bed, reading and relaxing before bedtime, and avoiding the TV, computer, phone, iPad close to bedtime.  -Encouraged patient if suicidal thoughts worsen to call office or visit nearest ED as soon as possible, patient verbalized understanding  -YADIEL reviewed and appropriate. Patient counseled on use of controlled substances.   -The benefits of a healthy diet and exercise were discussed with patient, especially the positive effects they have on mental health. Patient encouraged to consider lifestyle modification regarding  diet and exercise patterns to maximize results of mental health treatment.  -Reviewed previous available documentation  -Reviewed most recent available labs           Visit Diagnoses:    ICD-10-CM ICD-9-CM   1. Mood disorder (CMS/[facility])  F39 296.90   2. Medication management  Z79.899 V58.69   3. Generalized anxiety disorder  F41.1 300.02   4. Other insomnia  G47.09 780.52   5. Personality disorder, unspecified (CMS/[facility])  F60.9 301.9   6. Severe episode of recurrent major depressive disorder, with psychotic features (CMS/[facility])  F33.3 296.34   7. PTSD (post-traumatic stress disorder)  F43.10 309.81         TREATMENT PLAN/GOALS: Continue supportive psychotherapy efforts and medications as indicated. Treatment and medication options discussed during today's visit. Patient acknowledged and verbally consented to continue with current treatment plan and was educated on the importance of compliance with treatment  and follow-up appointments.    MEDICATION ISSUES:    Discussed medication options and treatment plan of prescribed medication as well as the risks, benefits, and side effects including potential falls, possible impaired driving and metabolic adversities among others. Patient is agreeable to call the office with any worsening of symptoms or onset of side effects. Patient is agreeable to call 911 or go to the nearest ER should he/she begin having SI/HI.     MEDS ORDERED DURING VISIT:  New Medications Ordered This Visit   Medications   • ARIPiprazole (ABILIFY) 10 MG tablet     Sig: Take 1 tablet by mouth Daily.     Dispense:  30 tablet     Refill:  0   • FLUoxetine (PROzac) 20 MG capsule     Sig: Take 3 capsules by mouth Daily.     Dispense:  90 capsule     Refill:  0   • hydrOXYzine (ATARAX) 10 MG tablet     Sig: Take 1 tablet by mouth 2 (Two) Times a Day As Needed for Anxiety.     Dispense:  60 tablet     Refill:  0   • traZODone (DESYREL) 50 MG tablet     Sig: Take 1 tablet by mouth Every Night.     Dispense:  30 tablet     Refill:  0       Return in about 4 weeks (around 8/3/2021).         Prognosis: Guarded dependent on medication/follow up and treatment plan compliance.  Functionality: pt showing improvements in important areas of daily functioning.     Short-term goals: Patient will adhere to medication regimen and note continued improvement in symptoms over the next 3 months.   Long-term goals: Patient will be adherent to medication management and psychotherapy with continued improvement in symptoms over the next 6 months          This document has been electronically signed by RUPINDER Gann   July 6, 2021 12:48 EDT    Part of this note may be an electronic transcription/translation of spoken language to printed text using the Dragon Dictation System.

## 2021-08-03 ENCOUNTER — OFFICE VISIT (OUTPATIENT)
Dept: PSYCHIATRY | Facility: CLINIC | Age: 51
End: 2021-08-03

## 2021-08-03 VITALS
DIASTOLIC BLOOD PRESSURE: 80 MMHG | SYSTOLIC BLOOD PRESSURE: 122 MMHG | BODY MASS INDEX: 32.44 KG/M2 | WEIGHT: 252.8 LBS | HEART RATE: 94 BPM | HEIGHT: 74 IN

## 2021-08-03 DIAGNOSIS — F39 MOOD DISORDER (HCC): ICD-10-CM

## 2021-08-03 DIAGNOSIS — G47.09 OTHER INSOMNIA: ICD-10-CM

## 2021-08-03 DIAGNOSIS — F41.1 GENERALIZED ANXIETY DISORDER: ICD-10-CM

## 2021-08-03 DIAGNOSIS — Z79.899 MEDICATION MANAGEMENT: ICD-10-CM

## 2021-08-03 DIAGNOSIS — F33.3 SEVERE EPISODE OF RECURRENT MAJOR DEPRESSIVE DISORDER, WITH PSYCHOTIC FEATURES (HCC): ICD-10-CM

## 2021-08-03 DIAGNOSIS — F43.10 PTSD (POST-TRAUMATIC STRESS DISORDER): ICD-10-CM

## 2021-08-03 DIAGNOSIS — F60.9 PERSONALITY DISORDER, UNSPECIFIED (HCC): Primary | ICD-10-CM

## 2021-08-03 PROCEDURE — 99214 OFFICE O/P EST MOD 30 MIN: CPT | Performed by: NURSE PRACTITIONER

## 2021-08-03 RX ORDER — TRAZODONE HYDROCHLORIDE 50 MG/1
50 TABLET ORAL NIGHTLY
Qty: 30 TABLET | Refills: 0 | Status: SHIPPED | OUTPATIENT
Start: 2021-08-03 | End: 2021-08-31 | Stop reason: SDUPTHER

## 2021-08-03 RX ORDER — ARIPIPRAZOLE 10 MG/1
10 TABLET ORAL DAILY
Qty: 30 TABLET | Refills: 0 | Status: SHIPPED | OUTPATIENT
Start: 2021-08-03 | End: 2021-08-31 | Stop reason: SDUPTHER

## 2021-08-03 RX ORDER — FLUOXETINE HYDROCHLORIDE 20 MG/1
60 CAPSULE ORAL DAILY
Qty: 90 CAPSULE | Refills: 0 | Status: SHIPPED | OUTPATIENT
Start: 2021-08-03 | End: 2021-08-31 | Stop reason: SDUPTHER

## 2021-08-03 RX ORDER — HYDROXYZINE HYDROCHLORIDE 10 MG/1
10 TABLET, FILM COATED ORAL 2 TIMES DAILY PRN
Qty: 60 TABLET | Refills: 0 | Status: SHIPPED | OUTPATIENT
Start: 2021-08-03 | End: 2021-08-31 | Stop reason: SDUPTHER

## 2021-08-03 NOTE — PROGRESS NOTES
Subjective   Jamarcus Allison is a 51 y.o. male who presents today for follow up    Chief Complaint:  Irritability, AVH    History of Present Illness: Patient presents as follow-up.  He reports improvement with depression and irritability.  States he does continue to struggle with loud noises.  States he continues to struggle with creativity. States he has been driving his sister to and from work in [place] and finds it enjoyable to out of the house. Reports yesterday he went for a walk with a neighbor which helped him sleep last night.  He continues to hear voices.  States that he is waiting for his optometrist to return from vacation so he can follow up concerning treatment of his cataract.  Reports sleep has improved, denies nightmares. Reports appetite is good, denies any weight changes.  He denies any SI since last visit. He denies HI.    The following portions of the patient's history were reviewed and updated as appropriate: allergies, current medications, past family history, past medical history, past social history, past surgical history and problem list.      Past Medical History:  Past Medical History:   Diagnosis Date   • Depression    • Diabetes mellitus (CMS/HCC)    • Diabetic neuropathy (CMS/HCC)    • IBS (irritable bowel syndrome)    • Peripheral artery disease (CMS/HCC)    • PTSD (post-traumatic stress disorder)    • Schizoid personality (CMS/HCC)        Social History:  Social History     Socioeconomic History   • Marital status: Single     Spouse name: Not on file   • Number of children: Not on file   • Years of education: Not on file   • Highest education level: Not on file   Tobacco Use   • Smoking status: Never Smoker   • Smokeless tobacco: Never Used   Vaping Use   • Vaping Use: Never used   Substance and Sexual Activity   • Alcohol use: Never   • Drug use: Never   • Sexual activity: Defer       Family History:  Family History   Problem Relation Age of Onset   • Heart disease Mother    •  Diabetes Mother    • Cancer Father    • Alcohol abuse Father    • Heart disease Sister    • Diabetes Sister    • Cancer Sister    • Thyroid disease Sister    • Depression Sister    • Anxiety disorder Sister    • Depression Brother    • Anxiety disorder Brother    • Alcohol abuse Brother        Past Surgical History:  Past Surgical History:   Procedure Laterality Date   • POLYPECTOMY         Problem List:  Patient Active Problem List   Diagnosis   • Type 2 diabetes mellitus with hyperglycemia, without long-term current use of insulin (CMS/[facility])       Allergy:   Allergies   Allergen Reactions   • Poison Ivy Extract Anaphylaxis   • Poison Oak Extract Anaphylaxis   • Sumac Anaphylaxis        Current Medications:   Current Outpatient Medications   Medication Sig Dispense Refill   • ARIPiprazole (ABILIFY) 10 MG tablet Take 1 tablet by mouth Daily. 30 tablet 0   • atorvastatin (LIPITOR) 40 MG tablet Take 1 tablet by mouth Every Night. 90 tablet 1   • Blood Glucose Monitoring Suppl (AccionD LOGIC BLOOD GLUCOSE) w/Device kit 1 Device Daily. 1 each 0   • FLUoxetine (PROzac) 20 MG capsule Take 3 capsules by mouth Daily. 90 capsule 0   • glipiZIDE-metFORMIN (METAGLIP) 5-500 MG per tablet Take 1 tablet by mouth 2 (Two) Times a Day Before Meals. 180 tablet 0   • glucose blood test strip Check daily 200 each 12   • hydrOXYzine (ATARAX) 10 MG tablet Take 1 tablet by mouth 2 (Two) Times a Day As Needed for Anxiety. 60 tablet 0   • Lancets (freestyle) lancets Check daily fasting 200 each 12   • lisinopril (PRINIVIL,ZESTRIL) 5 MG tablet Take 1 tablet by mouth Daily. 90 tablet 1   • loratadine (CLARITIN) 10 MG tablet Take 1 tablet by mouth Daily. 90 tablet 3   • pioglitazone (ACTOS) 15 MG tablet Take 1 tablet by mouth Daily. 90 tablet 1   • traZODone (DESYREL) 50 MG tablet Take 1 tablet by mouth Every Night. 30 tablet 0     No current facility-administered medications for this visit.       Review of Symptoms:    Review of Systems  "  Constitutional: Negative.    HENT: Negative.    Eyes: Negative.    Respiratory: Negative.    Cardiovascular: Negative.    Gastrointestinal: Positive for abdominal pain.   Endocrine: Negative.    Genitourinary: Negative.    Musculoskeletal: Negative.    Skin: Negative.    Neurological: Negative.    Hematological: Negative.    Psychiatric/Behavioral: Positive for agitation, hallucinations and depressed mood. Negative for suicidal ideas. The patient is nervous/anxious.        Objective   Physical Exam:   Blood pressure 122/80, pulse 94, height 188 cm (74.02\"), weight 115 kg (252 lb 12.8 oz).  Body mass index is 32.44 kg/m².    Appearance: Obese male, appropriately dressed, appears stated age and in no acute distress  Gait, Station, Strength: Within normal limits    Mental Status Exam:   Hygiene:   good  Cooperation:  Cooperative  Eye Contact:  Good  Psychomotor Behavior:  Appropriate  Affect:  Blunted  Mood: anxious  Hopelessness: 6  Speech:  Normal  Thought Process:  Goal directed and Linear  Thought Content:  Normal and Mood congruent  Suicidal:  None  Homicidal:  None  Hallucinations:  Auditory and Visual  Delusion:  Paranoid  Memory:  Intact  Orientation:  Person, Place, Time and Situation  Reliability:  good  Insight:  Fair  Judgement:  Fair  Impulse Control:  Fair  Physical/Medical Issues:  Yes Chronic health issues, nothing acute today     PHQ-Score Total:  PHQ-9 Total Score: 13   Patient screened positive for depression based on a PHQ-9 score of 13 on 8/3/2021. Follow-up recommendations include: Prescribed antidepressant medication treatment and Suicide Risk Assessment performed.        Lab Results:   No visits with results within 1 Month(s) from this visit.   Latest known visit with results is:   Admission on 06/22/2021, Discharged on 06/23/2021   Component Date Value Ref Range Status   • Glucose 06/22/2021 230* 65 - 99 mg/dL Final   • BUN 06/22/2021 12  6 - 20 mg/dL Final   • Creatinine 06/22/2021 0.79  " 0.76 - 1.27 mg/dL Final   • Sodium 06/22/2021 139  136 - 145 mmol/L Final   • Potassium 06/22/2021 3.9  3.5 - 5.2 mmol/L Final   • Chloride 06/22/2021 102  98 - 107 mmol/L Final   • CO2 06/22/2021 27.5  22.0 - 29.0 mmol/L Final   • Calcium 06/22/2021 9.3  8.6 - 10.5 mg/dL Final   • Total Protein 06/22/2021 7.0  6.0 - 8.5 g/dL Final   • Albumin 06/22/2021 3.99  3.50 - 5.20 g/dL Final   • ALT (SGPT) 06/22/2021 39  1 - 41 U/L Final   • AST (SGOT) 06/22/2021 25  1 - 40 U/L Final   • Alkaline Phosphatase 06/22/2021 105  39 - 117 U/L Final   • Total Bilirubin 06/22/2021 0.3  0.0 - 1.2 mg/dL Final   • eGFR Non African Amer 06/22/2021 103  >60 mL/min/1.73 Final   • Globulin 06/22/2021 3.0  gm/dL Final   • A/G Ratio 06/22/2021 1.3  g/dL Final   • BUN/Creatinine Ratio 06/22/2021 15.2  7.0 - 25.0 Final   • Anion Gap 06/22/2021 9.5  5.0 - 15.0 mmol/L Final   • Color, UA 06/22/2021 Yellow  Yellow, Straw Final   • Appearance, UA 06/22/2021 Clear  Clear Final   • pH, UA 06/22/2021 <=5.0  5.0 - 8.0 Final   • Specific Gravity, UA 06/22/2021 >=1.030  1.005 - 1.030 Final   • Glucose, UA 06/22/2021 >=1000 mg/dL (3+)* Negative Final   • Ketones, UA 06/22/2021 Negative  Negative Final   • Bilirubin, UA 06/22/2021 Negative  Negative Final   • Blood, UA 06/22/2021 Negative  Negative Final   • Protein, UA 06/22/2021 Negative  Negative Final   • Leuk Esterase, UA 06/22/2021 Negative  Negative Final   • Nitrite, UA 06/22/2021 Negative  Negative Final   • Urobilinogen, UA 06/22/2021 1.0 E.U./dL  0.2 - 1.0 E.U./dL Final   • Troponin T 06/22/2021 <0.010  0.000 - 0.030 ng/mL Final   • Free T4 06/22/2021 1.04  0.93 - 1.70 ng/dL Final   • TSH 06/22/2021 2.250  0.270 - 4.200 uIU/mL Final   • Magnesium 06/22/2021 2.1  1.6 - 2.6 mg/dL Final   • Amphetamine Screen, Urine 06/22/2021 Negative  Negative Final   • Barbiturates Screen, Urine 06/22/2021 Negative  Negative Final   • Benzodiazepine Screen, Urine 06/22/2021 Negative  Negative Final   • Cocaine  Screen, Urine 06/22/2021 Negative  Negative Final   • Methadone Screen, Urine 06/22/2021 Negative  Negative Final   • Opiate Screen 06/22/2021 Negative  Negative Final   • Phencyclidine (PCP), Urine 06/22/2021 Negative  Negative Final   • THC, Screen, Urine 06/22/2021 Negative  Negative Final   • 6-ACETYL MORPHINE 06/22/2021 Negative  Negative Final   • Buprenorphine, Screen, Urine 06/22/2021 Negative  Negative Final   • Oxycodone Screen, Urine 06/22/2021 Negative  Negative Final   • QT Interval 06/22/2021 412  ms Final   • QTC Interval 06/22/2021 466  ms Final   • ADENOVIRUS, PCR 06/22/2021 Not Detected  Not Detected Final   • Coronavirus 229E 06/22/2021 Not Detected  Not Detected Final   • Coronavirus HKU1 06/22/2021 Not Detected  Not Detected Final   • Coronavirus NL63 06/22/2021 Not Detected  Not Detected Final   • Coronavirus OC43 06/22/2021 Not Detected  Not Detected Final   • COVID19 06/22/2021 Not Detected  Not Detected - Ref. Range Final   • Human Metapneumovirus 06/22/2021 Not Detected  Not Detected Final   • Human Rhinovirus/Enterovirus 06/22/2021 Not Detected  Not Detected Final   • Influenza A PCR 06/22/2021 Not Detected  Not Detected Final   • Influenza B PCR 06/22/2021 Not Detected  Not Detected Final   • Parainfluenza Virus 1 06/22/2021 Not Detected  Not Detected Final   • Parainfluenza Virus 2 06/22/2021 Not Detected  Not Detected Final   • Parainfluenza Virus 3 06/22/2021 Not Detected  Not Detected Final   • Parainfluenza Virus 4 06/22/2021 Not Detected  Not Detected Final   • RSV, PCR 06/22/2021 Not Detected  Not Detected Final   • Bordetella pertussis pcr 06/22/2021 Not Detected  Not Detected Final   • Bordetella parapertussis PCR 06/22/2021 Not Detected  Not Detected Final   • Chlamydophila pneumoniae PCR 06/22/2021 Not Detected  Not Detected Final   • Mycoplasma pneumo by PCR 06/22/2021 Not Detected  Not Detected Final   • C-Reactive Protein 06/22/2021 0.30  0.00 - 0.50 mg/dL Final   • WBC  06/22/2021 8.36  3.40 - 10.80 10*3/mm3 Final   • RBC 06/22/2021 5.39  4.14 - 5.80 10*6/mm3 Final   • Hemoglobin 06/22/2021 15.8  13.0 - 17.7 g/dL Final   • Hematocrit 06/22/2021 46.8  37.5 - 51.0 % Final   • MCV 06/22/2021 86.8  79.0 - 97.0 fL Final   • MCH 06/22/2021 29.3  26.6 - 33.0 pg Final   • MCHC 06/22/2021 33.8  31.5 - 35.7 g/dL Final   • RDW 06/22/2021 12.5  12.3 - 15.4 % Final   • RDW-SD 06/22/2021 39.3  37.0 - 54.0 fl Final   • MPV 06/22/2021 11.3  6.0 - 12.0 fL Final   • Platelets 06/22/2021 239  140 - 450 10*3/mm3 Final   • Neutrophil % 06/22/2021 52.5  42.7 - 76.0 % Final   • Lymphocyte % 06/22/2021 36.1  19.6 - 45.3 % Final   • Monocyte % 06/22/2021 7.9  5.0 - 12.0 % Final   • Eosinophil % 06/22/2021 2.6  0.3 - 6.2 % Final   • Basophil % 06/22/2021 0.7  0.0 - 1.5 % Final   • Immature Grans % 06/22/2021 0.2  0.0 - 0.5 % Final   • Neutrophils, Absolute 06/22/2021 4.38  1.70 - 7.00 10*3/mm3 Final   • Lymphocytes, Absolute 06/22/2021 3.02  0.70 - 3.10 10*3/mm3 Final   • Monocytes, Absolute 06/22/2021 0.66  0.10 - 0.90 10*3/mm3 Final   • Eosinophils, Absolute 06/22/2021 0.22  0.00 - 0.40 10*3/mm3 Final   • Basophils, Absolute 06/22/2021 0.06  0.00 - 0.20 10*3/mm3 Final   • Immature Grans, Absolute 06/22/2021 0.02  0.00 - 0.05 10*3/mm3 Final   • nRBC 06/22/2021 0.0  0.0 - 0.2 /100 WBC Final   • Extra Tube 06/22/2021 Hold for add-ons.   Final    Auto resulted.   • Extra Tube 06/22/2021 hold for add-on   Final    Auto resulted   • Extra Tube 06/22/2021 Hold for add-ons.   Final    Auto resulted.       Assessment/Plan   Diagnoses and all orders for this visit:    1. Personality disorder, unspecified (CMS/HCC) (Primary)  -     ARIPiprazole (ABILIFY) 10 MG tablet; Take 1 tablet by mouth Daily.  Dispense: 30 tablet; Refill: 0  -     FLUoxetine (PROzac) 20 MG capsule; Take 3 capsules by mouth Daily.  Dispense: 90 capsule; Refill: 0    2. Mood disorder (CMS/HCC)  -     ARIPiprazole (ABILIFY) 10 MG tablet; Take 1  tablet by mouth Daily.  Dispense: 30 tablet; Refill: 0  -     FLUoxetine (PROzac) 20 MG capsule; Take 3 capsules by mouth Daily.  Dispense: 90 capsule; Refill: 0    3. Generalized anxiety disorder  -     ARIPiprazole (ABILIFY) 10 MG tablet; Take 1 tablet by mouth Daily.  Dispense: 30 tablet; Refill: 0  -     FLUoxetine (PROzac) 20 MG capsule; Take 3 capsules by mouth Daily.  Dispense: 90 capsule; Refill: 0  -     hydrOXYzine (ATARAX) 10 MG tablet; Take 1 tablet by mouth 2 (Two) Times a Day As Needed for Anxiety.  Dispense: 60 tablet; Refill: 0  -     traZODone (DESYREL) 50 MG tablet; Take 1 tablet by mouth Every Night.  Dispense: 30 tablet; Refill: 0    4. Severe episode of recurrent major depressive disorder, with psychotic features (CMS/HCC)  -     ARIPiprazole (ABILIFY) 10 MG tablet; Take 1 tablet by mouth Daily.  Dispense: 30 tablet; Refill: 0  -     FLUoxetine (PROzac) 20 MG capsule; Take 3 capsules by mouth Daily.  Dispense: 90 capsule; Refill: 0    5. Other insomnia  -     traZODone (DESYREL) 50 MG tablet; Take 1 tablet by mouth Every Night.  Dispense: 30 tablet; Refill: 0    6. PTSD (post-traumatic stress disorder)    7. Medication management        -Continue aripiprazole 10 mg daily for paranoia and AVH. Lengthy discussion with patient on the possible side effects of antipsychotic medications including increased cholesterol, increased blood sugar, and possibility of weight gain.  Also discussed the need to monitor lab work associated with this.  The risk of muscle movement disorders with this class of medication was also discussed. Although patient's A1c is elevated, benefits outweigh the risk considering AVH often leads to his suicidal thoughts  -Continue fluoxetine 60 mg daily for anxiety and depression  -Continue hydroxyzine 10 mg twice daily as needed for anxiety  -Continue trazodone 50 mg nightly for sleep  -Encouraged patient to continue psychotherapy  -We discussed sleep hygiene including going to  bed at the same time and getting up at the same time every day, decreased caffeine consumption, going to bed early enough to get 7 or 8 hours in bed, reading and relaxing before bedtime, and avoiding the TV, computer, phone, iPad close to bedtime.  -Encouraged patient if suicidal thoughts worsen to call office or visit nearest ED as soon as possible, patient verbalized understanding  -YADIEL reviewed and appropriate. Patient counseled on use of controlled substances.   -The benefits of a healthy diet and exercise were discussed with patient, especially the positive effects they have on mental health. Patient encouraged to consider lifestyle modification regarding  diet and exercise patterns to maximize results of mental health treatment.  -Reviewed previous available documentation  -Reviewed most recent available labs           Visit Diagnoses:    ICD-10-CM ICD-9-CM   1. Personality disorder, unspecified (CMS/[facility])  F60.9 301.9   2. Mood disorder (CMS/[facility])  F39 296.90   3. Generalized anxiety disorder  F41.1 300.02   4. Severe episode of recurrent major depressive disorder, with psychotic features (CMS/[facility])  F33.3 296.34   5. Other insomnia  G47.09 780.52   6. PTSD (post-traumatic stress disorder)  F43.10 309.81   7. Medication management  Z79.899 V58.69         TREATMENT PLAN/GOALS: Continue supportive psychotherapy efforts and medications as indicated. Treatment and medication options discussed during today's visit. Patient acknowledged and verbally consented to continue with current treatment plan and was educated on the importance of compliance with treatment and follow-up appointments.    MEDICATION ISSUES:    Discussed medication options and treatment plan of prescribed medication as well as the risks, benefits, and side effects including potential falls, possible impaired driving and metabolic adversities among others. Patient is agreeable to call the office with any worsening of symptoms or onset of side  effects. Patient is agreeable to call 911 or go to the nearest ER should he/she begin having SI/HI.     MEDS ORDERED DURING VISIT:  New Medications Ordered This Visit   Medications   • ARIPiprazole (ABILIFY) 10 MG tablet     Sig: Take 1 tablet by mouth Daily.     Dispense:  30 tablet     Refill:  0   • FLUoxetine (PROzac) 20 MG capsule     Sig: Take 3 capsules by mouth Daily.     Dispense:  90 capsule     Refill:  0   • hydrOXYzine (ATARAX) 10 MG tablet     Sig: Take 1 tablet by mouth 2 (Two) Times a Day As Needed for Anxiety.     Dispense:  60 tablet     Refill:  0   • traZODone (DESYREL) 50 MG tablet     Sig: Take 1 tablet by mouth Every Night.     Dispense:  30 tablet     Refill:  0       Return in about 4 weeks (around 8/31/2021), or if symptoms worsen or fail to improve.         Prognosis: Guarded dependent on medication/follow up and treatment plan compliance.  Functionality: pt showing improvements in important areas of daily functioning.     Short-term goals: Patient will adhere to medication regimen and note continued improvement in symptoms over the next 3 months.   Long-term goals: Patient will be adherent to medication management and psychotherapy with continued improvement in symptoms over the next 6 months          This document has been electronically signed by RUPINDER Gann   August 3, 2021 17:09 EDT    Part of this note may be an electronic transcription/translation of spoken language to printed text using the Dragon Dictation System.

## 2021-08-26 ENCOUNTER — OFFICE VISIT (OUTPATIENT)
Dept: FAMILY MEDICINE CLINIC | Facility: CLINIC | Age: 51
End: 2021-08-26

## 2021-08-26 VITALS
TEMPERATURE: 97.1 F | DIASTOLIC BLOOD PRESSURE: 80 MMHG | SYSTOLIC BLOOD PRESSURE: 126 MMHG | HEART RATE: 71 BPM | OXYGEN SATURATION: 98 % | WEIGHT: 252 LBS | BODY MASS INDEX: 32.34 KG/M2 | HEIGHT: 74 IN

## 2021-08-26 DIAGNOSIS — R51.9 CHRONIC NONINTRACTABLE HEADACHE, UNSPECIFIED HEADACHE TYPE: ICD-10-CM

## 2021-08-26 DIAGNOSIS — J30.2 SEASONAL ALLERGIES: ICD-10-CM

## 2021-08-26 DIAGNOSIS — E11.65 TYPE 2 DIABETES MELLITUS WITH HYPERGLYCEMIA, WITHOUT LONG-TERM CURRENT USE OF INSULIN (HCC): Primary | ICD-10-CM

## 2021-08-26 DIAGNOSIS — G89.29 CHRONIC NONINTRACTABLE HEADACHE, UNSPECIFIED HEADACHE TYPE: ICD-10-CM

## 2021-08-26 DIAGNOSIS — R73.9 HYPERGLYCEMIA: ICD-10-CM

## 2021-08-26 DIAGNOSIS — Z00.00 MEDICARE ANNUAL WELLNESS VISIT, SUBSEQUENT: ICD-10-CM

## 2021-08-26 LAB
ALBUMIN SERPL-MCNC: 4.02 G/DL (ref 3.5–5.2)
ALBUMIN/GLOB SERPL: 1.4 G/DL
ALP SERPL-CCNC: 90 U/L (ref 39–117)
ALT SERPL W P-5'-P-CCNC: 49 U/L (ref 1–41)
ANION GAP SERPL CALCULATED.3IONS-SCNC: 8.5 MMOL/L (ref 5–15)
AST SERPL-CCNC: 27 U/L (ref 1–40)
BASOPHILS # BLD AUTO: 0.04 10*3/MM3 (ref 0–0.2)
BASOPHILS NFR BLD AUTO: 0.6 % (ref 0–1.5)
BILIRUB SERPL-MCNC: 0.5 MG/DL (ref 0–1.2)
BUN SERPL-MCNC: 12 MG/DL (ref 6–20)
BUN/CREAT SERPL: 16.4 (ref 7–25)
CALCIUM SPEC-SCNC: 9.4 MG/DL (ref 8.6–10.5)
CHLORIDE SERPL-SCNC: 102 MMOL/L (ref 98–107)
CO2 SERPL-SCNC: 28.5 MMOL/L (ref 22–29)
CREAT SERPL-MCNC: 0.73 MG/DL (ref 0.76–1.27)
DEPRECATED RDW RBC AUTO: 41.3 FL (ref 37–54)
EOSINOPHIL # BLD AUTO: 0.18 10*3/MM3 (ref 0–0.4)
EOSINOPHIL NFR BLD AUTO: 2.6 % (ref 0.3–6.2)
ERYTHROCYTE [DISTWIDTH] IN BLOOD BY AUTOMATED COUNT: 12.5 % (ref 12.3–15.4)
GFR SERPL CREATININE-BSD FRML MDRD: 113 ML/MIN/1.73
GLOBULIN UR ELPH-MCNC: 2.8 GM/DL
GLUCOSE SERPL-MCNC: 199 MG/DL (ref 65–99)
HBA1C MFR BLD: 9.2 % (ref 4.8–5.6)
HCT VFR BLD AUTO: 47.3 % (ref 37.5–51)
HGB BLD-MCNC: 15.4 G/DL (ref 13–17.7)
IMM GRANULOCYTES # BLD AUTO: 0.01 10*3/MM3 (ref 0–0.05)
IMM GRANULOCYTES NFR BLD AUTO: 0.1 % (ref 0–0.5)
LYMPHOCYTES # BLD AUTO: 2.41 10*3/MM3 (ref 0.7–3.1)
LYMPHOCYTES NFR BLD AUTO: 35 % (ref 19.6–45.3)
MCH RBC QN AUTO: 29.2 PG (ref 26.6–33)
MCHC RBC AUTO-ENTMCNC: 32.6 G/DL (ref 31.5–35.7)
MCV RBC AUTO: 89.8 FL (ref 79–97)
MONOCYTES # BLD AUTO: 0.41 10*3/MM3 (ref 0.1–0.9)
MONOCYTES NFR BLD AUTO: 6 % (ref 5–12)
NEUTROPHILS NFR BLD AUTO: 3.84 10*3/MM3 (ref 1.7–7)
NEUTROPHILS NFR BLD AUTO: 55.7 % (ref 42.7–76)
NRBC BLD AUTO-RTO: 0 /100 WBC (ref 0–0.2)
PLATELET # BLD AUTO: 254 10*3/MM3 (ref 140–450)
PMV BLD AUTO: 11.5 FL (ref 6–12)
POTASSIUM SERPL-SCNC: 4.7 MMOL/L (ref 3.5–5.2)
PROT SERPL-MCNC: 6.8 G/DL (ref 6–8.5)
RBC # BLD AUTO: 5.27 10*6/MM3 (ref 4.14–5.8)
SODIUM SERPL-SCNC: 139 MMOL/L (ref 136–145)
WBC # BLD AUTO: 6.89 10*3/MM3 (ref 3.4–10.8)

## 2021-08-26 PROCEDURE — G0439 PPPS, SUBSEQ VISIT: HCPCS | Performed by: FAMILY MEDICINE

## 2021-08-26 PROCEDURE — 83036 HEMOGLOBIN GLYCOSYLATED A1C: CPT | Performed by: FAMILY MEDICINE

## 2021-08-26 PROCEDURE — 80053 COMPREHEN METABOLIC PANEL: CPT | Performed by: FAMILY MEDICINE

## 2021-08-26 PROCEDURE — 99213 OFFICE O/P EST LOW 20 MIN: CPT | Performed by: FAMILY MEDICINE

## 2021-08-26 PROCEDURE — 85025 COMPLETE CBC W/AUTO DIFF WBC: CPT | Performed by: FAMILY MEDICINE

## 2021-08-26 PROCEDURE — 36415 COLL VENOUS BLD VENIPUNCTURE: CPT | Performed by: FAMILY MEDICINE

## 2021-08-26 RX ORDER — GLIPIZIDE AND METFORMIN HCL 5; 500 MG/1; MG/1
1 TABLET, FILM COATED ORAL
Qty: 180 TABLET | Refills: 0 | Status: SHIPPED | OUTPATIENT
Start: 2021-08-26 | End: 2021-11-23 | Stop reason: SDUPTHER

## 2021-08-26 NOTE — PROGRESS NOTES
QUICK REFERENCE INFORMATION:  The ABCs of the Annual Wellness Visit    Subsequent Medicare Wellness Visit    HEALTH RISK ASSESSMENT    1970    Recent Hospitalizations:  No hospitalization(s) within the last year..        Current Medical Providers:  Patient Care Team:  Everardo Venegas DO as PCP - General (Family Medicine)        Smoking Status:  Social History     Tobacco Use   Smoking Status Never Smoker   Smokeless Tobacco Never Used       Alcohol Consumption:  Social History     Substance and Sexual Activity   Alcohol Use Never       Depression Screen:   PHQ-2/PHQ-9 Depression Screening 8/26/2021   Little interest or pleasure in doing things 0   Feeling down, depressed, or hopeless 1   Trouble falling or staying asleep, or sleeping too much -   Feeling tired or having little energy -   Poor appetite or overeating -   Feeling bad about yourself - or that you are a failure or have let yourself or your family down -   Trouble concentrating on things, such as reading the newspaper or watching television -   Moving or speaking so slowly that other people could have noticed. Or the opposite - being so fidgety or restless that you have been moving around a lot more than usual -   Thoughts that you would be better off dead, or of hurting yourself in some way -   Total Score 1   If you checked off any problems, how difficult have these problems made it for you to do your work, take care of things at home, or get along with other people? -       Health Habits and Functional and Cognitive Screening:  Functional & Cognitive Status 8/26/2021   Do you have difficulty preparing food and eating? No   Do you have difficulty bathing yourself, getting dressed or grooming yourself? No   Do you have difficulty using the toilet? No   Do you have difficulty moving around from place to place? No   Do you have trouble with steps or getting out of a bed or a chair? No   Current Diet Low Carb Diet   Dental Exam Not up to date    Eye Exam Up to date   Exercise (times per week) 2 times per week   Current Exercises Include Walking   Do you need help using the phone?  No   Are you deaf or do you have serious difficulty hearing?  No   Do you need help with transportation? No   Do you need help shopping? No   Do you need help preparing meals?  No   Do you need help with housework?  No   Do you need help with laundry? No   Do you need help taking your medications? No   Do you need help managing money? No   Do you ever drive or ride in a car without wearing a seat belt? No           Does the patient have evidence of cognitive impairment? No    Aspirin use counseling: Taking ASA appropriately as indicated      Recent Lab Results:  CMP:  Lab Results   Component Value Date    BUN 12 06/22/2021    CREATININE 0.79 06/22/2021    EGFRIFNONA 103 06/22/2021    BCR 15.2 06/22/2021     06/22/2021    K 3.9 06/22/2021    CO2 27.5 06/22/2021    CALCIUM 9.3 06/22/2021    ALBUMIN 3.99 06/22/2021    LABIL2 1.5 08/21/2015    BILITOT 0.3 06/22/2021    ALKPHOS 105 06/22/2021    AST 25 06/22/2021    ALT 39 06/22/2021     Lipid Panel:  Lab Results   Component Value Date    CHOL 237 (H) 11/04/2020    TRIG 200 (H) 11/04/2020    HDL 31 (L) 11/04/2020    VLDL 37 11/04/2020    LDLHDL 5.35 11/04/2020     HbA1c:  Lab Results   Component Value Date    HGBA1C 12.51 (H) 11/04/2020       Visual Acuity:   Visual Acuity Screening    Right eye Left eye Both eyes   Without correction:  20/40 20/40   With correction:          Age-appropriate Screening Schedule:  Refer to the list below for future screening recommendations based on patient's age, sex and/or medical conditions. Orders for these recommended tests are listed in the plan section. The patient has been provided with a written plan.    Health Maintenance   Topic Date Due   • TDAP/TD VACCINES (1 - Tdap) Never done   • URINE MICROALBUMIN  08/21/2016   • ZOSTER VACCINE (1 of 2) Never done   • DIABETIC EYE EXAM  Never done    • HEMOGLOBIN A1C  05/04/2021   • INFLUENZA VACCINE  10/01/2021   • DIABETIC FOOT EXAM  06/24/2022        Subjective   History of Present Illness    Jamarcus Allison is a 51 y.o. male who presents for an Subsequent Wellness Visit.    The following portions of the patient's history were reviewed and updated as appropriate: allergies, current medications, past family history, past medical history, past social history, past surgical history and problem list.    Outpatient Medications Prior to Visit   Medication Sig Dispense Refill   • ARIPiprazole (ABILIFY) 10 MG tablet Take 1 tablet by mouth Daily. 30 tablet 0   • atorvastatin (LIPITOR) 40 MG tablet Take 1 tablet by mouth Every Night. 90 tablet 1   • Blood Glucose Monitoring Suppl (B-D LOGIC BLOOD GLUCOSE) w/Device kit 1 Device Daily. 1 each 0   • FLUoxetine (PROzac) 20 MG capsule Take 3 capsules by mouth Daily. 90 capsule 0   • glipiZIDE-metFORMIN (METAGLIP) 5-500 MG per tablet Take 1 tablet by mouth 2 (Two) Times a Day Before Meals. 180 tablet 0   • glucose blood test strip Check daily 200 each 12   • hydrOXYzine (ATARAX) 10 MG tablet Take 1 tablet by mouth 2 (Two) Times a Day As Needed for Anxiety. 60 tablet 0   • Lancets (freestyle) lancets Check daily fasting 200 each 12   • lisinopril (PRINIVIL,ZESTRIL) 5 MG tablet Take 1 tablet by mouth Daily. 90 tablet 1   • loratadine (CLARITIN) 10 MG tablet Take 1 tablet by mouth Daily. 90 tablet 3   • pioglitazone (ACTOS) 15 MG tablet Take 1 tablet by mouth Daily. 90 tablet 1   • traZODone (DESYREL) 50 MG tablet Take 1 tablet by mouth Every Night. 30 tablet 0     No facility-administered medications prior to visit.       Patient Active Problem List   Diagnosis   • Type 2 diabetes mellitus with hyperglycemia, without long-term current use of insulin (CMS/[facility])       Advance Care Planning:  ACP discussion was held with the patient during this visit. Patient does not have an advance directive, information  provided.    Identification of Risk Factors:  Risk factors include: Advance Directive Discussion  Chronic Pain .    Review of Systems   Constitutional: Negative for chills, fever and unexpected weight loss.   HENT: Negative for congestion and sore throat.    Eyes: Negative for blurred vision and visual disturbance.   Respiratory: Negative for cough and wheezing.    Cardiovascular: Negative for chest pain and palpitations.   Gastrointestinal: Negative for abdominal pain and diarrhea.   Endocrine: Negative for cold intolerance and heat intolerance.   Genitourinary: Negative for dysuria.   Musculoskeletal: Negative for arthralgias and neck stiffness.   Neurological: Negative for dizziness, seizures and syncope.   Psychiatric/Behavioral: Negative for self-injury, suicidal ideas and depressed mood.       Compared to one year ago, the patient feels his physical health is the same.  Compared to one year ago, the patient feels his mental health is the same.    Objective     Physical Exam  Vitals and nursing note reviewed.   Constitutional:       Appearance: He is well-developed.   HENT:      Head: Normocephalic and atraumatic.      Right Ear: External ear normal.      Left Ear: External ear normal.      Nose: Nose normal.   Eyes:      Conjunctiva/sclera: Conjunctivae normal.      Pupils: Pupils are equal, round, and reactive to light.   Cardiovascular:      Rate and Rhythm: Normal rate and regular rhythm.      Heart sounds: Normal heart sounds.   Pulmonary:      Effort: Pulmonary effort is normal.      Breath sounds: Normal breath sounds.   Abdominal:      General: Bowel sounds are normal.      Palpations: Abdomen is soft.   Musculoskeletal:      Cervical back: Normal range of motion and neck supple.   Skin:     General: Skin is warm and dry.   Neurological:      Mental Status: He is alert and oriented to person, place, and time.   Psychiatric:         Behavior: Behavior normal.         Vitals:    08/26/21 1125   BP: 126/80  "  BP Location: Right arm   Patient Position: Sitting   Pulse: 71   Temp: 97.1 °F (36.2 °C)   SpO2: 98%   Weight: 114 kg (252 lb)   Height: 188 cm (74.02\")   PainSc: 0-No pain       Patient's Body mass index is 32.34 kg/m². indicating that he is obese (BMI >30). Obesity-related health conditions include the following: diabetes mellitus. Obesity is unchanged. BMI is is above average; BMI management plan is completed. We discussed portion control and increasing exercise..      Assessment/Plan   Patient Self-Management and Personalized Health Advice  The patient has been provided with information about: diet and exercise and preventive services including:   · Annual Wellness Visit (AWV).    Visit Diagnoses:    ICD-10-CM ICD-9-CM   1. Type 2 diabetes mellitus with hyperglycemia, without long-term current use of insulin (CMS/[facility])  E11.65 250.00     790.29   2. Seasonal allergies  J30.2 477.9   3. Chronic nonintractable headache, unspecified headache type  R51.9 784.0    G89.29    4. Hyperglycemia  R73.9 790.29   5. Medicare annual wellness visit, subsequent  Z00.00 V70.0   On Medicare wellness today, no concerns.  He is exercising regularly and watching his diet.  He is doing well on his diabetes medications.  Follow-up in 3 months.    His Sister Kaycee, is who he is going to choose to be his power of .    Orders Placed This Encounter   Procedures   • Comprehensive Metabolic Panel     Standing Status:   Future     Number of Occurrences:   1     Standing Expiration Date:   8/26/2022     Order Specific Question:   Release to patient     Answer:   Immediate   • CBC Auto Differential     Standing Status:   Future     Number of Occurrences:   1     Standing Expiration Date:   8/26/2022     Order Specific Question:   Release to patient     Answer:   Immediate   • Hemoglobin A1c     Standing Status:   Future     Number of Occurrences:   1     Standing Expiration Date:   8/26/2022     Order Specific Question:   Release to " patient     Answer:   Immediate   Will order blood work today to ensure adequate control of his hemoglobin A1c.    Outpatient Encounter Medications as of 8/26/2021   Medication Sig Dispense Refill   • ARIPiprazole (ABILIFY) 10 MG tablet Take 1 tablet by mouth Daily. 30 tablet 0   • atorvastatin (LIPITOR) 40 MG tablet Take 1 tablet by mouth Every Night. 90 tablet 1   • Blood Glucose Monitoring Suppl (B-D LOGIC BLOOD GLUCOSE) w/Device kit 1 Device Daily. 1 each 0   • FLUoxetine (PROzac) 20 MG capsule Take 3 capsules by mouth Daily. 90 capsule 0   • glipiZIDE-metFORMIN (METAGLIP) 5-500 MG per tablet Take 1 tablet by mouth 2 (Two) Times a Day Before Meals. 180 tablet 0   • glucose blood test strip Check daily 200 each 12   • hydrOXYzine (ATARAX) 10 MG tablet Take 1 tablet by mouth 2 (Two) Times a Day As Needed for Anxiety. 60 tablet 0   • Lancets (freestyle) lancets Check daily fasting 200 each 12   • lisinopril (PRINIVIL,ZESTRIL) 5 MG tablet Take 1 tablet by mouth Daily. 90 tablet 1   • loratadine (CLARITIN) 10 MG tablet Take 1 tablet by mouth Daily. 90 tablet 3   • pioglitazone (ACTOS) 15 MG tablet Take 1 tablet by mouth Daily. 90 tablet 1   • traZODone (DESYREL) 50 MG tablet Take 1 tablet by mouth Every Night. 30 tablet 0     No facility-administered encounter medications on file as of 8/26/2021.       Reviewed use of high risk medication in the elderly: yes  Reviewed for potential of harmful drug interactions in the elderly: yes    Follow Up:  Return in about 3 months (around 11/26/2021) for Recheck.     An After Visit Summary and PPPS with all of these plans were given to the patient.

## 2021-08-31 ENCOUNTER — TELEPHONE (OUTPATIENT)
Dept: FAMILY MEDICINE CLINIC | Facility: CLINIC | Age: 51
End: 2021-08-31

## 2021-08-31 ENCOUNTER — OFFICE VISIT (OUTPATIENT)
Dept: PSYCHIATRY | Facility: CLINIC | Age: 51
End: 2021-08-31

## 2021-08-31 VITALS
OXYGEN SATURATION: 94 % | HEART RATE: 77 BPM | BODY MASS INDEX: 32.24 KG/M2 | WEIGHT: 251.2 LBS | DIASTOLIC BLOOD PRESSURE: 72 MMHG | SYSTOLIC BLOOD PRESSURE: 136 MMHG | HEIGHT: 74 IN

## 2021-08-31 DIAGNOSIS — F39 MOOD DISORDER (HCC): ICD-10-CM

## 2021-08-31 DIAGNOSIS — F60.9 PERSONALITY DISORDER, UNSPECIFIED (HCC): ICD-10-CM

## 2021-08-31 DIAGNOSIS — F33.3 SEVERE EPISODE OF RECURRENT MAJOR DEPRESSIVE DISORDER, WITH PSYCHOTIC FEATURES (HCC): ICD-10-CM

## 2021-08-31 DIAGNOSIS — F41.1 GENERALIZED ANXIETY DISORDER: ICD-10-CM

## 2021-08-31 DIAGNOSIS — G47.09 OTHER INSOMNIA: ICD-10-CM

## 2021-08-31 DIAGNOSIS — F43.10 PTSD (POST-TRAUMATIC STRESS DISORDER): Primary | ICD-10-CM

## 2021-08-31 DIAGNOSIS — Z79.899 MEDICATION MANAGEMENT: ICD-10-CM

## 2021-08-31 PROCEDURE — 99214 OFFICE O/P EST MOD 30 MIN: CPT | Performed by: NURSE PRACTITIONER

## 2021-08-31 RX ORDER — TRAZODONE HYDROCHLORIDE 50 MG/1
50 TABLET ORAL NIGHTLY
Qty: 30 TABLET | Refills: 0 | Status: SHIPPED | OUTPATIENT
Start: 2021-08-31 | End: 2021-09-21 | Stop reason: SDUPTHER

## 2021-08-31 RX ORDER — ARIPIPRAZOLE 5 MG/1
5 TABLET ORAL DAILY
Qty: 5 TABLET | Refills: 0 | Status: SHIPPED | OUTPATIENT
Start: 2021-08-31 | End: 2021-09-21

## 2021-08-31 RX ORDER — FLUOXETINE HYDROCHLORIDE 40 MG/1
40 CAPSULE ORAL DAILY
Qty: 60 CAPSULE | Refills: 0 | Status: SHIPPED | OUTPATIENT
Start: 2021-08-31 | End: 2021-09-21 | Stop reason: SDUPTHER

## 2021-08-31 RX ORDER — HYDROXYZINE HYDROCHLORIDE 10 MG/1
10 TABLET, FILM COATED ORAL 2 TIMES DAILY PRN
Qty: 60 TABLET | Refills: 0 | Status: SHIPPED | OUTPATIENT
Start: 2021-08-31 | End: 2021-09-21 | Stop reason: SDUPTHER

## 2021-08-31 RX ORDER — BREXPIPRAZOLE 0.5 MG/1
0.5 TABLET ORAL DAILY
Qty: 30 TABLET | Refills: 0 | Status: SHIPPED | OUTPATIENT
Start: 2021-08-31 | End: 2022-01-03

## 2021-08-31 NOTE — PROGRESS NOTES
"Subjective   Jamarcus Allison is a 51 y.o. male who presents today for follow up    Chief Complaint:  Paranoia    History of Present Illness: Patient presents as follow up. States he continues to struggle with lack of creativity and feeling \"numb\". States this began shortly after beginning aripiprazole. States it does help with his AVH however he has not been able to draw very much since he began this medication.   He reports trazodone has helped his sleep, states his neighbors are loud and often wakes at night. Reports appetite is good, denies any weight changes. He reports improvement with paranoia. He denies SI/HI.    The following portions of the patient's history were reviewed and updated as appropriate: allergies, current medications, past family history, past medical history, past social history, past surgical history and problem list.      Past Medical History:  Past Medical History:   Diagnosis Date   • Depression    • Diabetes mellitus (CMS/HCC)    • Diabetic neuropathy (CMS/HCC)    • IBS (irritable bowel syndrome)    • Peripheral artery disease (CMS/HCC)    • PTSD (post-traumatic stress disorder)    • Schizoid personality (CMS/HCC)        Social History:  Social History     Socioeconomic History   • Marital status: Single     Spouse name: Not on file   • Number of children: Not on file   • Years of education: Not on file   • Highest education level: Not on file   Tobacco Use   • Smoking status: Never Smoker   • Smokeless tobacco: Never Used   Vaping Use   • Vaping Use: Never used   Substance and Sexual Activity   • Alcohol use: Never   • Drug use: Never   • Sexual activity: Defer       Family History:  Family History   Problem Relation Age of Onset   • Heart disease Mother    • Diabetes Mother    • Cancer Father    • Alcohol abuse Father    • Heart disease Sister    • Diabetes Sister    • Cancer Sister    • Thyroid disease Sister    • Depression Sister    • Anxiety disorder Sister    • Depression Brother  "   • Anxiety disorder Brother    • Alcohol abuse Brother        Past Surgical History:  Past Surgical History:   Procedure Laterality Date   • POLYPECTOMY         Problem List:  Patient Active Problem List   Diagnosis   • Type 2 diabetes mellitus with hyperglycemia, without long-term current use of insulin (CMS/[facility])       Allergy:   Allergies   Allergen Reactions   • Poison Ivy Extract Anaphylaxis   • Poison Oak Extract Anaphylaxis   • Sumac Anaphylaxis        Current Medications:   Current Outpatient Medications   Medication Sig Dispense Refill   • ARIPiprazole (ABILIFY) 5 MG tablet Take 1 tablet by mouth Daily. 5 tablet 0   • atorvastatin (LIPITOR) 40 MG tablet Take 1 tablet by mouth Every Night. 90 tablet 1   • Blood Glucose Monitoring Suppl (BFigure 8 [employer] LOGIC BLOOD GLUCOSE) w/Device kit 1 Device Daily. 1 each 0   • FLUoxetine (PROzac) 40 MG capsule Take 1 capsule by mouth Daily. 60 capsule 0   • glipiZIDE-metFORMIN (METAGLIP) 5-500 MG per tablet Take 1 tablet by mouth 2 (Two) Times a Day Before Meals. 180 tablet 0   • glucose blood test strip Check daily 200 each 12   • hydrOXYzine (ATARAX) 10 MG tablet Take 1 tablet by mouth 2 (Two) Times a Day As Needed for Anxiety. 60 tablet 0   • Lancets (freestyle) lancets Check daily fasting 200 each 12   • lisinopril (PRINIVIL,ZESTRIL) 5 MG tablet Take 1 tablet by mouth Daily. 90 tablet 1   • loratadine (CLARITIN) 10 MG tablet Take 1 tablet by mouth Daily. 90 tablet 3   • pioglitazone (ACTOS) 15 MG tablet Take 1 tablet by mouth Daily. 90 tablet 1   • traZODone (DESYREL) 50 MG tablet Take 1 tablet by mouth Every Night. 30 tablet 0   • Brexpiprazole (Rexulti) 0.5 MG tablet Take 0.5 mg by mouth Daily. 30 tablet 0     No current facility-administered medications for this visit.       Review of Symptoms:    Review of Systems   Constitutional: Negative.    HENT: Negative.    Eyes: Negative.    Respiratory: Negative.    Cardiovascular: Negative.    Gastrointestinal: Negative.   "  Genitourinary: Negative.    Musculoskeletal: Negative.    Skin: Negative.    Neurological: Negative.    Hematological: Negative.    Psychiatric/Behavioral: Positive for decreased concentration, hallucinations and depressed mood. Negative for suicidal ideas. The patient is nervous/anxious.        Objective   Physical Exam:   Blood pressure 136/72, pulse 77, height 188 cm (74.02\"), weight 114 kg (251 lb 3.2 oz), SpO2 94 %.  Body mass index is 32.24 kg/m².    Appearance: Obese male, appropriately dressed, appears stated age and in no acute distress  Gait, Station, Strength: Within normal limits    Mental Status Exam:   Hygiene:   good  Cooperation:  Cooperative  Eye Contact:  Good  Psychomotor Behavior:  Appropriate  Affect:  Appropriate  Mood: normal  Hopelessness: 5  Speech:  Normal  Thought Process:  Goal directed and Linear  Thought Content:  Normal and Mood congruent  Suicidal:  None  Homicidal:  None  Hallucinations:  None  Delusion:  None  Memory:  Intact  Orientation:  Person, Place, Time and Situation  Reliability:  good  Insight:  Fair  Judgement:  Fair  Impulse Control:  Good  Physical/Medical Issues:  No      PHQ-Score Total:  PHQ-9 Total Score: 19   Patient screened positive for depression based on a PHQ-9 score of 19 on 8/31/2021. Follow-up recommendations include: Prescribed antidepressant medication treatment and Suicide Risk Assessment performed.        Lab Results:   Office Visit on 08/26/2021   Component Date Value Ref Range Status   • Glucose 08/26/2021 199* 65 - 99 mg/dL Final   • BUN 08/26/2021 12  6 - 20 mg/dL Final   • Creatinine 08/26/2021 0.73* 0.76 - 1.27 mg/dL Final   • Sodium 08/26/2021 139  136 - 145 mmol/L Final   • Potassium 08/26/2021 4.7  3.5 - 5.2 mmol/L Final   • Chloride 08/26/2021 102  98 - 107 mmol/L Final   • CO2 08/26/2021 28.5  22.0 - 29.0 mmol/L Final   • Calcium 08/26/2021 9.4  8.6 - 10.5 mg/dL Final   • Total Protein 08/26/2021 6.8  6.0 - 8.5 g/dL Final   • Albumin " 08/26/2021 4.02  3.50 - 5.20 g/dL Final   • ALT (SGPT) 08/26/2021 49* 1 - 41 U/L Final   • AST (SGOT) 08/26/2021 27  1 - 40 U/L Final   • Alkaline Phosphatase 08/26/2021 90  39 - 117 U/L Final   • Total Bilirubin 08/26/2021 0.5  0.0 - 1.2 mg/dL Final   • eGFR Non  Amer 08/26/2021 113  >60 mL/min/1.73 Final   • Globulin 08/26/2021 2.8  gm/dL Final   • A/G Ratio 08/26/2021 1.4  g/dL Final   • BUN/Creatinine Ratio 08/26/2021 16.4  7.0 - 25.0 Final   • Anion Gap 08/26/2021 8.5  5.0 - 15.0 mmol/L Final   • WBC 08/26/2021 6.89  3.40 - 10.80 10*3/mm3 Final   • RBC 08/26/2021 5.27  4.14 - 5.80 10*6/mm3 Final   • Hemoglobin 08/26/2021 15.4  13.0 - 17.7 g/dL Final   • Hematocrit 08/26/2021 47.3  37.5 - 51.0 % Final   • MCV 08/26/2021 89.8  79.0 - 97.0 fL Final   • MCH 08/26/2021 29.2  26.6 - 33.0 pg Final   • MCHC 08/26/2021 32.6  31.5 - 35.7 g/dL Final   • RDW 08/26/2021 12.5  12.3 - 15.4 % Final   • RDW-SD 08/26/2021 41.3  37.0 - 54.0 fl Final   • MPV 08/26/2021 11.5  6.0 - 12.0 fL Final   • Platelets 08/26/2021 254  140 - 450 10*3/mm3 Final   • Neutrophil % 08/26/2021 55.7  42.7 - 76.0 % Final   • Lymphocyte % 08/26/2021 35.0  19.6 - 45.3 % Final   • Monocyte % 08/26/2021 6.0  5.0 - 12.0 % Final   • Eosinophil % 08/26/2021 2.6  0.3 - 6.2 % Final   • Basophil % 08/26/2021 0.6  0.0 - 1.5 % Final   • Immature Grans % 08/26/2021 0.1  0.0 - 0.5 % Final   • Neutrophils, Absolute 08/26/2021 3.84  1.70 - 7.00 10*3/mm3 Final   • Lymphocytes, Absolute 08/26/2021 2.41  0.70 - 3.10 10*3/mm3 Final   • Monocytes, Absolute 08/26/2021 0.41  0.10 - 0.90 10*3/mm3 Final   • Eosinophils, Absolute 08/26/2021 0.18  0.00 - 0.40 10*3/mm3 Final   • Basophils, Absolute 08/26/2021 0.04  0.00 - 0.20 10*3/mm3 Final   • Immature Grans, Absolute 08/26/2021 0.01  0.00 - 0.05 10*3/mm3 Final   • nRBC 08/26/2021 0.0  0.0 - 0.2 /100 WBC Final   • Hemoglobin A1C 08/26/2021 9.20* 4.80 - 5.60 % Final       Assessment/Plan   Diagnoses and all orders for  this visit:    1. PTSD (post-traumatic stress disorder) (Primary)    2. Mood disorder (CMS/HCC)  -     FLUoxetine (PROzac) 40 MG capsule; Take 1 capsule by mouth Daily.  Dispense: 60 capsule; Refill: 0  -     ARIPiprazole (ABILIFY) 5 MG tablet; Take 1 tablet by mouth Daily.  Dispense: 5 tablet; Refill: 0    3. Generalized anxiety disorder  -     FLUoxetine (PROzac) 40 MG capsule; Take 1 capsule by mouth Daily.  Dispense: 60 capsule; Refill: 0  -     hydrOXYzine (ATARAX) 10 MG tablet; Take 1 tablet by mouth 2 (Two) Times a Day As Needed for Anxiety.  Dispense: 60 tablet; Refill: 0  -     traZODone (DESYREL) 50 MG tablet; Take 1 tablet by mouth Every Night.  Dispense: 30 tablet; Refill: 0  -     Brexpiprazole (Rexulti) 0.5 MG tablet; Take 0.5 mg by mouth Daily.  Dispense: 30 tablet; Refill: 0  -     ARIPiprazole (ABILIFY) 5 MG tablet; Take 1 tablet by mouth Daily.  Dispense: 5 tablet; Refill: 0    4. Personality disorder, unspecified (CMS/HCC)  -     FLUoxetine (PROzac) 40 MG capsule; Take 1 capsule by mouth Daily.  Dispense: 60 capsule; Refill: 0  -     ARIPiprazole (ABILIFY) 5 MG tablet; Take 1 tablet by mouth Daily.  Dispense: 5 tablet; Refill: 0    5. Severe episode of recurrent major depressive disorder, with psychotic features (CMS/HCC)  -     FLUoxetine (PROzac) 40 MG capsule; Take 1 capsule by mouth Daily.  Dispense: 60 capsule; Refill: 0  -     Brexpiprazole (Rexulti) 0.5 MG tablet; Take 0.5 mg by mouth Daily.  Dispense: 30 tablet; Refill: 0  -     ARIPiprazole (ABILIFY) 5 MG tablet; Take 1 tablet by mouth Daily.  Dispense: 5 tablet; Refill: 0    6. Other insomnia  -     traZODone (DESYREL) 50 MG tablet; Take 1 tablet by mouth Every Night.  Dispense: 30 tablet; Refill: 0    7. Medication management        -Decrease aripiprazole 5 mg for 5 days then discontinue  -Begin brexpiprazole 0.5 mg daily for AVH and paranoia. Lengthy discussion with patient on the possible side effects of antipsychotic medications  including increased cholesterol, increased blood sugar, and possibility of weight gain.  Also discussed the need to monitor lab work associated with this.  The risk of muscle movement disorders with this class of medication was also discussed.  -Increase fluoxetine 80 mg daily for anxiety and depression  -Continue trazodone 50 mg nightly for sleep  -Continue hydroxyzine 10 mg twice daily as needed for anxiety  -Encouraged patient to continue therapy  -YADIEL reviewed and appropriate. Patient counseled on use of controlled substances.   -The benefits of a healthy diet and exercise were discussed with patient, especially the positive effects they have on mental health. Patient encouraged to consider lifestyle modification regarding  diet and exercise patterns to maximize results of mental health treatment.  -Reviewed previous available documentation  -Reviewed most recent available labs              Visit Diagnoses:    ICD-10-CM ICD-9-CM   1. PTSD (post-traumatic stress disorder)  F43.10 309.81   2. Mood disorder (CMS/HCC)  F39 296.90   3. Generalized anxiety disorder  F41.1 300.02   4. Personality disorder, unspecified (CMS/HCC)  F60.9 301.9   5. Severe episode of recurrent major depressive disorder, with psychotic features (CMS/HCC)  F33.3 296.34   6. Other insomnia  G47.09 780.52   7. Medication management  Z79.899 V58.69         TREATMENT PLAN/GOALS: Continue supportive psychotherapy efforts and medications as indicated. Treatment and medication options discussed during today's visit. Patient acknowledged and verbally consented to continue with current treatment plan and was educated on the importance of compliance with treatment and follow-up appointments.    MEDICATION ISSUES:    Discussed medication options and treatment plan of prescribed medication as well as the risks, benefits, and side effects including potential falls, possible impaired driving and metabolic adversities among others. Patient is agreeable  to call the office with any worsening of symptoms or onset of side effects. Patient is agreeable to call 911 or go to the nearest ER should he/she begin having SI/HI.     MEDS ORDERED DURING VISIT:  New Medications Ordered This Visit   Medications   • FLUoxetine (PROzac) 40 MG capsule     Sig: Take 1 capsule by mouth Daily.     Dispense:  60 capsule     Refill:  0   • hydrOXYzine (ATARAX) 10 MG tablet     Sig: Take 1 tablet by mouth 2 (Two) Times a Day As Needed for Anxiety.     Dispense:  60 tablet     Refill:  0   • traZODone (DESYREL) 50 MG tablet     Sig: Take 1 tablet by mouth Every Night.     Dispense:  30 tablet     Refill:  0   • Brexpiprazole (Rexulti) 0.5 MG tablet     Sig: Take 0.5 mg by mouth Daily.     Dispense:  30 tablet     Refill:  0   • ARIPiprazole (ABILIFY) 5 MG tablet     Sig: Take 1 tablet by mouth Daily.     Dispense:  5 tablet     Refill:  0       Return in about 4 weeks (around 9/28/2021).         Prognosis: Guarded dependent on medication/follow up and treatment plan compliance.  Functionality: pt showing improvements in important areas of daily functioning.     Short-term goals: Patient will adhere to medication regimen and note continued improvement in symptoms over the next 3 months.   Long-term goals: Patient will be adherent to medication management and psychotherapy with continued improvement in symptoms over the next 6 months          This document has been electronically signed by RUPINDER Gann   August 31, 2021 13:09 EDT    Part of this note may be an electronic transcription/translation of spoken language to printed text using the Dragon Dictation System.

## 2021-08-31 NOTE — TELEPHONE ENCOUNTER
----- Message from Everardo Venegas DO sent at 8/31/2021  8:37 AM EDT -----  I reviewed your blood work. Your hemoglobin A1c over the past 10 months has gone from 12.51, down to 9.2. This is still far from the goal of 7, but it is an improvement. Please keep up the good work. At follow-up, we will consider starting other diabetic medications.

## 2021-09-03 ENCOUNTER — TELEPHONE (OUTPATIENT)
Dept: PSYCHIATRY | Facility: CLINIC | Age: 51
End: 2021-09-03

## 2021-09-21 ENCOUNTER — OFFICE VISIT (OUTPATIENT)
Dept: PSYCHIATRY | Facility: CLINIC | Age: 51
End: 2021-09-21

## 2021-09-21 VITALS
HEART RATE: 76 BPM | DIASTOLIC BLOOD PRESSURE: 83 MMHG | SYSTOLIC BLOOD PRESSURE: 132 MMHG | BODY MASS INDEX: 32.31 KG/M2 | HEIGHT: 74 IN | WEIGHT: 251.8 LBS

## 2021-09-21 DIAGNOSIS — G47.09 OTHER INSOMNIA: ICD-10-CM

## 2021-09-21 DIAGNOSIS — F39 MOOD DISORDER (HCC): ICD-10-CM

## 2021-09-21 DIAGNOSIS — F60.9 PERSONALITY DISORDER, UNSPECIFIED (HCC): ICD-10-CM

## 2021-09-21 DIAGNOSIS — F43.10 PTSD (POST-TRAUMATIC STRESS DISORDER): ICD-10-CM

## 2021-09-21 DIAGNOSIS — F41.1 GENERALIZED ANXIETY DISORDER: Primary | ICD-10-CM

## 2021-09-21 DIAGNOSIS — F33.3 SEVERE EPISODE OF RECURRENT MAJOR DEPRESSIVE DISORDER, WITH PSYCHOTIC FEATURES (HCC): ICD-10-CM

## 2021-09-21 DIAGNOSIS — Z79.899 MEDICATION MANAGEMENT: ICD-10-CM

## 2021-09-21 PROCEDURE — 99214 OFFICE O/P EST MOD 30 MIN: CPT | Performed by: NURSE PRACTITIONER

## 2021-09-21 RX ORDER — FLUOXETINE HYDROCHLORIDE 40 MG/1
40 CAPSULE ORAL 2 TIMES DAILY
Qty: 60 CAPSULE | Refills: 0 | Status: SHIPPED | OUTPATIENT
Start: 2021-09-21 | End: 2021-11-08 | Stop reason: SDUPTHER

## 2021-09-21 RX ORDER — HYDROXYZINE HYDROCHLORIDE 10 MG/1
10 TABLET, FILM COATED ORAL 2 TIMES DAILY PRN
Qty: 60 TABLET | Refills: 0 | Status: SHIPPED | OUTPATIENT
Start: 2021-09-21 | End: 2021-11-08 | Stop reason: SDUPTHER

## 2021-09-21 RX ORDER — TRAZODONE HYDROCHLORIDE 50 MG/1
50 TABLET ORAL NIGHTLY
Qty: 30 TABLET | Refills: 0 | Status: SHIPPED | OUTPATIENT
Start: 2021-09-21 | End: 2021-11-08 | Stop reason: SDUPTHER

## 2021-09-21 NOTE — PROGRESS NOTES
"Subjective   Jamarcus Allison is a 51 y.o. male who presents today for follow up    Chief Complaint:  Depression    History of Present Illness: Patient presents as follow up. States since stopping aripiprazole he has felt less \"numb\".  And has been able to start drawing again.  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   He reports sleep is good. Reports appetite is good. Reports continuing to hear voices. He denies SI/HI.    The following portions of the patient's history were reviewed and updated as appropriate: allergies, current medications, past family history, past medical history, past social history, past surgical history and problem list.      Past Medical History:  Past Medical History:   Diagnosis Date   • Depression    • Diabetes mellitus (CMS/HCC)    • Diabetic neuropathy (CMS/HCC)    • IBS (irritable bowel syndrome)    • Peripheral artery disease (CMS/HCC)    • PTSD (post-traumatic stress disorder)    • Schizoid personality (CMS/HCC)        Social History:  Social History     Socioeconomic History   • Marital status: Single     Spouse name: Not on file   • Number of children: Not on file   • Years of education: Not on file   • Highest education level: Not on file   Tobacco Use   • Smoking status: Never Smoker   • Smokeless tobacco: Never Used   Vaping Use   • Vaping Use: Never used   Substance and Sexual Activity   • Alcohol use: Never   • Drug use: Never   • Sexual activity: Defer       Family History:  Family History   Problem Relation Age of Onset   • Heart disease Mother    • Diabetes Mother    • Cancer Father    • Alcohol abuse Father    • Heart disease Sister    • Diabetes Sister    • Cancer Sister    • Thyroid disease Sister    • Depression Sister    • Anxiety disorder Sister    • Depression Brother    • Anxiety disorder Brother    • Alcohol abuse Brother  "       Past Surgical History:  Past Surgical History:   Procedure Laterality Date   • POLYPECTOMY         Problem List:  Patient Active Problem List   Diagnosis   • Type 2 diabetes mellitus with hyperglycemia, without long-term current use of insulin (CMS/[facility])       Allergy:   Allergies   Allergen Reactions   • Poison Ivy Extract Anaphylaxis   • Poison Oak Extract Anaphylaxis   • Sumac Anaphylaxis        Current Medications:   Current Outpatient Medications   Medication Sig Dispense Refill   • atorvastatin (LIPITOR) 40 MG tablet Take 1 tablet by mouth Every Night. 90 tablet 1   • Blood Glucose Monitoring Suppl (B-D LOGIC BLOOD GLUCOSE) w/Device kit 1 Device Daily. 1 each 0   • Brexpiprazole (Rexulti) 0.5 MG tablet Take 0.5 mg by mouth Daily. 30 tablet 0   • FLUoxetine (PROzac) 40 MG capsule Take 1 capsule by mouth 2 (Two) Times a Day. 60 capsule 0   • glipiZIDE-metFORMIN (METAGLIP) 5-500 MG per tablet Take 1 tablet by mouth 2 (Two) Times a Day Before Meals. 180 tablet 0   • glucose blood test strip Check daily 200 each 12   • hydrOXYzine (ATARAX) 10 MG tablet Take 1 tablet by mouth 2 (Two) Times a Day As Needed for Anxiety. 60 tablet 0   • Lancets (freestyle) lancets Check daily fasting 200 each 12   • lisinopril (PRINIVIL,ZESTRIL) 5 MG tablet Take 1 tablet by mouth Daily. 90 tablet 1   • loratadine (CLARITIN) 10 MG tablet Take 1 tablet by mouth Daily. 90 tablet 3   • pioglitazone (ACTOS) 15 MG tablet Take 1 tablet by mouth Daily. 90 tablet 1   • traZODone (DESYREL) 50 MG tablet Take 1 tablet by mouth Every Night. 30 tablet 0     No current facility-administered medications for this visit.       Review of Symptoms:    Review of Systems   Constitutional: Positive for fatigue.   HENT: Negative.    Eyes: Negative.    Respiratory: Negative.    Cardiovascular: Negative.    Gastrointestinal: Negative.    Genitourinary: Negative.    Musculoskeletal: Negative.    Skin: Negative.    Neurological: Negative.   "  Psychiatric/Behavioral: Positive for hallucinations, depressed mood and stress. Negative for suicidal ideas. The patient is nervous/anxious.        Objective   Physical Exam:   Blood pressure 132/83, pulse 76, height 188 cm (74.02\"), weight 114 kg (251 lb 12.8 oz).  Body mass index is 32.32 kg/m².    Appearance: Obese male, appropriately dressed, appears stated age and in no acute distress  Gait, Station, Strength: Within normal limits    Mental Status Exam:   Hygiene:   good  Cooperation:  Cooperative  Eye Contact:  Good  Psychomotor Behavior:  Appropriate  Affect:  Appropriate  Mood: normal  Hopelessness: Denies  Speech:  Normal  Thought Process:  Linear  Thought Content:  Normal and Mood congruent  Suicidal:  None  Homicidal:  None  Hallucinations:  Auditory  Delusion:  None  Memory:  Intact  Orientation:  Person, Place and Time  Reliability:  good  Insight:  Fair  Judgement:  Fair  Impulse Control:  Good  Physical/Medical Issues:  No      PHQ-Score Total:  PHQ-9 Total Score: 9   Patient screened positive for depression based on a PHQ-9 score of 9 on 9/21/2021. Follow-up recommendations include: Prescribed antidepressant medication treatment and Suicide Risk Assessment performed.        Lab Results:   Office Visit on 08/26/2021   Component Date Value Ref Range Status   • Glucose 08/26/2021 199* 65 - 99 mg/dL Final   • BUN 08/26/2021 12  6 - 20 mg/dL Final   • Creatinine 08/26/2021 0.73* 0.76 - 1.27 mg/dL Final   • Sodium 08/26/2021 139  136 - 145 mmol/L Final   • Potassium 08/26/2021 4.7  3.5 - 5.2 mmol/L Final   • Chloride 08/26/2021 102  98 - 107 mmol/L Final   • CO2 08/26/2021 28.5  22.0 - 29.0 mmol/L Final   • Calcium 08/26/2021 9.4  8.6 - 10.5 mg/dL Final   • Total Protein 08/26/2021 6.8  6.0 - 8.5 g/dL Final   • Albumin 08/26/2021 4.02  3.50 - 5.20 g/dL Final   • ALT (SGPT) 08/26/2021 49* 1 - 41 U/L Final   • AST (SGOT) 08/26/2021 27  1 - 40 U/L Final   • Alkaline Phosphatase 08/26/2021 90  39 - 117 U/L " Final   • Total Bilirubin 08/26/2021 0.5  0.0 - 1.2 mg/dL Final   • eGFR Non  Amer 08/26/2021 113  >60 mL/min/1.73 Final   • Globulin 08/26/2021 2.8  gm/dL Final   • A/G Ratio 08/26/2021 1.4  g/dL Final   • BUN/Creatinine Ratio 08/26/2021 16.4  7.0 - 25.0 Final   • Anion Gap 08/26/2021 8.5  5.0 - 15.0 mmol/L Final   • WBC 08/26/2021 6.89  3.40 - 10.80 10*3/mm3 Final   • RBC 08/26/2021 5.27  4.14 - 5.80 10*6/mm3 Final   • Hemoglobin 08/26/2021 15.4  13.0 - 17.7 g/dL Final   • Hematocrit 08/26/2021 47.3  37.5 - 51.0 % Final   • MCV 08/26/2021 89.8  79.0 - 97.0 fL Final   • MCH 08/26/2021 29.2  26.6 - 33.0 pg Final   • MCHC 08/26/2021 32.6  31.5 - 35.7 g/dL Final   • RDW 08/26/2021 12.5  12.3 - 15.4 % Final   • RDW-SD 08/26/2021 41.3  37.0 - 54.0 fl Final   • MPV 08/26/2021 11.5  6.0 - 12.0 fL Final   • Platelets 08/26/2021 254  140 - 450 10*3/mm3 Final   • Neutrophil % 08/26/2021 55.7  42.7 - 76.0 % Final   • Lymphocyte % 08/26/2021 35.0  19.6 - 45.3 % Final   • Monocyte % 08/26/2021 6.0  5.0 - 12.0 % Final   • Eosinophil % 08/26/2021 2.6  0.3 - 6.2 % Final   • Basophil % 08/26/2021 0.6  0.0 - 1.5 % Final   • Immature Grans % 08/26/2021 0.1  0.0 - 0.5 % Final   • Neutrophils, Absolute 08/26/2021 3.84  1.70 - 7.00 10*3/mm3 Final   • Lymphocytes, Absolute 08/26/2021 2.41  0.70 - 3.10 10*3/mm3 Final   • Monocytes, Absolute 08/26/2021 0.41  0.10 - 0.90 10*3/mm3 Final   • Eosinophils, Absolute 08/26/2021 0.18  0.00 - 0.40 10*3/mm3 Final   • Basophils, Absolute 08/26/2021 0.04  0.00 - 0.20 10*3/mm3 Final   • Immature Grans, Absolute 08/26/2021 0.01  0.00 - 0.05 10*3/mm3 Final   • nRBC 08/26/2021 0.0  0.0 - 0.2 /100 WBC Final   • Hemoglobin A1C 08/26/2021 9.20* 4.80 - 5.60 % Final       Assessment/Plan   Diagnoses and all orders for this visit:    1. Generalized anxiety disorder (Primary)  -     FLUoxetine (PROzac) 40 MG capsule; Take 1 capsule by mouth 2 (Two) Times a Day.  Dispense: 60 capsule; Refill: 0  -      hydrOXYzine (ATARAX) 10 MG tablet; Take 1 tablet by mouth 2 (Two) Times a Day As Needed for Anxiety.  Dispense: 60 tablet; Refill: 0  -     traZODone (DESYREL) 50 MG tablet; Take 1 tablet by mouth Every Night.  Dispense: 30 tablet; Refill: 0    2. Severe episode of recurrent major depressive disorder, with psychotic features (CMS/HCC)  -     FLUoxetine (PROzac) 40 MG capsule; Take 1 capsule by mouth 2 (Two) Times a Day.  Dispense: 60 capsule; Refill: 0    3. Mood disorder (CMS/HCC)  -     FLUoxetine (PROzac) 40 MG capsule; Take 1 capsule by mouth 2 (Two) Times a Day.  Dispense: 60 capsule; Refill: 0    4. Personality disorder, unspecified (CMS/HCC)  -     FLUoxetine (PROzac) 40 MG capsule; Take 1 capsule by mouth 2 (Two) Times a Day.  Dispense: 60 capsule; Refill: 0    5. Other insomnia  -     traZODone (DESYREL) 50 MG tablet; Take 1 tablet by mouth Every Night.  Dispense: 30 tablet; Refill: 0    6. Medication management    7. PTSD (post-traumatic stress disorder)        -Continue brexpiprazole 0.5 mg daily for AVH and paranoia. Lengthy discussion with patient on the possible side effects of antipsychotic medications including increased cholesterol, increased blood sugar, and possibility of weight gain.  Also discussed the need to monitor lab work associated with this.  The risk of muscle movement disorders with this class of medication was also discussed.  -Continue fluoxetine 80 mg daily for anxiety and depression  -Continue trazodone 50 mg nightly for sleep  -Continue hydroxyzine 10 mg twice daily as needed for anxiety  -Encouraged patient to continue therapy  -YADILE reviewed and appropriate. Patient counseled on use of controlled substances.   -The benefits of a healthy diet and exercise were discussed with patient, especially the positive effects they have on mental health. Patient encouraged to consider lifestyle modification regarding  diet and exercise patterns to maximize results of mental health  treatment.  -Reviewed previous available documentation  -Reviewed most recent available labs              Visit Diagnoses:    ICD-10-CM ICD-9-CM   1. Generalized anxiety disorder  F41.1 300.02   2. Severe episode of recurrent major depressive disorder, with psychotic features (CMS/HCC)  F33.3 296.34   3. Mood disorder (CMS/HCC)  F39 296.90   4. Personality disorder, unspecified (CMS/HCC)  F60.9 301.9   5. Other insomnia  G47.09 780.52   6. Medication management  Z79.899 V58.69   7. PTSD (post-traumatic stress disorder)  F43.10 309.81         TREATMENT PLAN/GOALS: Continue supportive psychotherapy efforts and medications as indicated. Treatment and medication options discussed during today's visit. Patient acknowledged and verbally consented to continue with current treatment plan and was educated on the importance of compliance with treatment and follow-up appointments.    MEDICATION ISSUES:    Discussed medication options and treatment plan of prescribed medication as well as the risks, benefits, and side effects including potential falls, possible impaired driving and metabolic adversities among others. Patient is agreeable to call the office with any worsening of symptoms or onset of side effects. Patient is agreeable to call 911 or go to the nearest ER should he/she begin having SI/HI.     MEDS ORDERED DURING VISIT:  New Medications Ordered This Visit   Medications   • FLUoxetine (PROzac) 40 MG capsule     Sig: Take 1 capsule by mouth 2 (Two) Times a Day.     Dispense:  60 capsule     Refill:  0   • hydrOXYzine (ATARAX) 10 MG tablet     Sig: Take 1 tablet by mouth 2 (Two) Times a Day As Needed for Anxiety.     Dispense:  60 tablet     Refill:  0   • traZODone (DESYREL) 50 MG tablet     Sig: Take 1 tablet by mouth Every Night.     Dispense:  30 tablet     Refill:  0       Return in about 4 weeks (around 10/19/2021), or if symptoms worsen or fail to improve.         Prognosis: Guarded dependent on  medication/follow up and treatment plan compliance.  Functionality: pt showing improvements in important areas of daily functioning.     Short-term goals: Patient will adhere to medication regimen and note continued improvement in symptoms over the next 3 months.   Long-term goals: Patient will be adherent to medication management and psychotherapy with continued improvement in symptoms over the next 6 months          This document has been electronically signed by RUPINDER Gann   September 21, 2021 15:43 EDT    Part of this note may be an electronic transcription/translation of spoken language to printed text using the Dragon Dictation System.

## 2021-09-22 ENCOUNTER — TRANSCRIBE ORDERS (OUTPATIENT)
Dept: ADMINISTRATIVE | Facility: HOSPITAL | Age: 51
End: 2021-09-22

## 2021-09-22 DIAGNOSIS — Z01.818 OTHER SPECIFIED PRE-OPERATIVE EXAMINATION: Primary | ICD-10-CM

## 2021-09-24 ENCOUNTER — LAB (OUTPATIENT)
Dept: LAB | Facility: HOSPITAL | Age: 51
End: 2021-09-24

## 2021-09-24 DIAGNOSIS — Z01.818 OTHER SPECIFIED PRE-OPERATIVE EXAMINATION: ICD-10-CM

## 2021-09-24 LAB — SARS-COV-2 RNA PNL SPEC NAA+PROBE: NOT DETECTED

## 2021-09-24 PROCEDURE — C9803 HOPD COVID-19 SPEC COLLECT: HCPCS

## 2021-09-24 PROCEDURE — U0005 INFEC AGEN DETEC AMPLI PROBE: HCPCS | Performed by: OPHTHALMOLOGY

## 2021-09-24 PROCEDURE — U0004 COV-19 TEST NON-CDC HGH THRU: HCPCS | Performed by: OPHTHALMOLOGY

## 2021-10-26 ENCOUNTER — TRANSCRIBE ORDERS (OUTPATIENT)
Dept: ADMINISTRATIVE | Facility: HOSPITAL | Age: 51
End: 2021-10-26

## 2021-10-26 DIAGNOSIS — Z01.818 PRE-OPERATIVE CLEARANCE: Primary | ICD-10-CM

## 2021-10-27 ENCOUNTER — APPOINTMENT (OUTPATIENT)
Dept: LAB | Facility: HOSPITAL | Age: 51
End: 2021-10-27

## 2021-10-29 ENCOUNTER — LAB (OUTPATIENT)
Dept: LAB | Facility: HOSPITAL | Age: 51
End: 2021-10-29

## 2021-10-29 DIAGNOSIS — Z01.818 PRE-OPERATIVE CLEARANCE: ICD-10-CM

## 2021-10-29 PROCEDURE — U0005 INFEC AGEN DETEC AMPLI PROBE: HCPCS | Performed by: OPHTHALMOLOGY

## 2021-10-29 PROCEDURE — C9803 HOPD COVID-19 SPEC COLLECT: HCPCS

## 2021-10-29 PROCEDURE — U0004 COV-19 TEST NON-CDC HGH THRU: HCPCS | Performed by: OPHTHALMOLOGY

## 2021-10-30 LAB — SARS-COV-2 RNA PNL SPEC NAA+PROBE: NOT DETECTED

## 2021-11-08 ENCOUNTER — OFFICE VISIT (OUTPATIENT)
Dept: PSYCHIATRY | Facility: CLINIC | Age: 51
End: 2021-11-08

## 2021-11-08 VITALS
WEIGHT: 255 LBS | HEART RATE: 74 BPM | TEMPERATURE: 98.4 F | BODY MASS INDEX: 32.73 KG/M2 | OXYGEN SATURATION: 97 % | SYSTOLIC BLOOD PRESSURE: 142 MMHG | HEIGHT: 74 IN | DIASTOLIC BLOOD PRESSURE: 98 MMHG

## 2021-11-08 DIAGNOSIS — G47.09 OTHER INSOMNIA: ICD-10-CM

## 2021-11-08 DIAGNOSIS — F39 MOOD DISORDER (HCC): ICD-10-CM

## 2021-11-08 DIAGNOSIS — E66.09 CLASS 1 OBESITY DUE TO EXCESS CALORIES WITH SERIOUS COMORBIDITY AND BODY MASS INDEX (BMI) OF 32.0 TO 32.9 IN ADULT: ICD-10-CM

## 2021-11-08 DIAGNOSIS — F41.1 GENERALIZED ANXIETY DISORDER: Primary | ICD-10-CM

## 2021-11-08 DIAGNOSIS — F33.3 SEVERE EPISODE OF RECURRENT MAJOR DEPRESSIVE DISORDER, WITH PSYCHOTIC FEATURES (HCC): ICD-10-CM

## 2021-11-08 DIAGNOSIS — F43.10 PTSD (POST-TRAUMATIC STRESS DISORDER): ICD-10-CM

## 2021-11-08 DIAGNOSIS — Z79.899 MEDICATION MANAGEMENT: ICD-10-CM

## 2021-11-08 DIAGNOSIS — F60.9 PERSONALITY DISORDER, UNSPECIFIED (HCC): ICD-10-CM

## 2021-11-08 PROCEDURE — 99214 OFFICE O/P EST MOD 30 MIN: CPT | Performed by: NURSE PRACTITIONER

## 2021-11-08 RX ORDER — HYDROXYZINE HYDROCHLORIDE 10 MG/1
10 TABLET, FILM COATED ORAL 2 TIMES DAILY PRN
Qty: 60 TABLET | Refills: 1 | Status: SHIPPED | OUTPATIENT
Start: 2021-11-08 | End: 2022-01-03 | Stop reason: SDUPTHER

## 2021-11-08 RX ORDER — FLUOXETINE HYDROCHLORIDE 40 MG/1
40 CAPSULE ORAL 2 TIMES DAILY
Qty: 60 CAPSULE | Refills: 1 | Status: SHIPPED | OUTPATIENT
Start: 2021-11-08 | End: 2022-01-03 | Stop reason: SDUPTHER

## 2021-11-08 RX ORDER — TRAZODONE HYDROCHLORIDE 50 MG/1
50 TABLET ORAL NIGHTLY
Qty: 30 TABLET | Refills: 1 | Status: SHIPPED | OUTPATIENT
Start: 2021-11-08 | End: 2022-01-03 | Stop reason: SDUPTHER

## 2021-11-08 RX ORDER — BREXPIPRAZOLE 1 MG/1
1 TABLET ORAL DAILY
Qty: 30 TABLET | Refills: 1 | Status: SHIPPED | OUTPATIENT
Start: 2021-11-08 | End: 2022-01-03 | Stop reason: SDUPTHER

## 2021-11-08 NOTE — PROGRESS NOTES
Subjective   Jamarcus Allison is a 51 y.o. male who presents today for follow up    Chief Complaint:  Depression    History of Present Illness: She presents as follow-up.  States anxiety and depression has improved.  Reports that he recently had cataract surgery and the eyedrops has irritated his IBS.  Reports the recent irritation has increased anxiety a small amount but it is manageable.  He reports sleep is improved.  Reports appetite is good.  He reports AH has decreased.  He denies SI/HI.    The following portions of the patient's history were reviewed and updated as appropriate: allergies, current medications, past family history, past medical history, past social history, past surgical history and problem list.      Past Medical History:  Past Medical History:   Diagnosis Date   • Depression    • Diabetes mellitus (HCC)    • Diabetic neuropathy (HCC)    • IBS (irritable bowel syndrome)    • Peripheral artery disease (HCC)    • PTSD (post-traumatic stress disorder)    • Schizoid personality (HCC)        Social History:  Social History     Socioeconomic History   • Marital status: Single   Tobacco Use   • Smoking status: Never Smoker   • Smokeless tobacco: Never Used   Vaping Use   • Vaping Use: Never used   Substance and Sexual Activity   • Alcohol use: Never   • Drug use: Never   • Sexual activity: Defer       Family History:  Family History   Problem Relation Age of Onset   • Heart disease Mother    • Diabetes Mother    • Cancer Father    • Alcohol abuse Father    • Heart disease Sister    • Diabetes Sister    • Cancer Sister    • Thyroid disease Sister    • Depression Sister    • Anxiety disorder Sister    • Depression Brother    • Anxiety disorder Brother    • Alcohol abuse Brother        Past Surgical History:  Past Surgical History:   Procedure Laterality Date   • EYE SURGERY     • POLYPECTOMY         Problem List:  Patient Active Problem List   Diagnosis   • Type 2 diabetes mellitus with hyperglycemia,  without long-term current use of insulin ([facility])       Allergy:   Allergies   Allergen Reactions   • Poison Ivy Extract Anaphylaxis   • Poison Oak Extract Anaphylaxis   • Sumac Anaphylaxis        Current Medications:   Current Outpatient Medications   Medication Sig Dispense Refill   • atorvastatin (LIPITOR) 40 MG tablet Take 1 tablet by mouth Every Night. 90 tablet 1   • Blood Glucose Monitoring Suppl (B-D LOGIC BLOOD GLUCOSE) w/Device kit 1 Device Daily. 1 each 0   • FLUoxetine (PROzac) 40 MG capsule Take 1 capsule by mouth 2 (Two) Times a Day. 60 capsule 1   • glipiZIDE-metFORMIN (METAGLIP) 5-500 MG per tablet Take 1 tablet by mouth 2 (Two) Times a Day Before Meals. 180 tablet 0   • glucose blood test strip Check daily 200 each 12   • hydrOXYzine (ATARAX) 10 MG tablet Take 1 tablet by mouth 2 (Two) Times a Day As Needed for Anxiety. 60 tablet 1   • Lancets (freestyle) lancets Check daily fasting 200 each 12   • lisinopril (PRINIVIL,ZESTRIL) 5 MG tablet Take 1 tablet by mouth Daily. 90 tablet 1   • loratadine (CLARITIN) 10 MG tablet Take 1 tablet by mouth Daily. 90 tablet 3   • pioglitazone (ACTOS) 15 MG tablet Take 1 tablet by mouth Daily. 90 tablet 1   • traZODone (DESYREL) 50 MG tablet Take 1 tablet by mouth Every Night. 30 tablet 1   • Brexpiprazole (Rexulti) 0.5 MG tablet Take 0.5 mg by mouth Daily. 30 tablet 0   • Brexpiprazole (Rexulti) 1 MG tablet Take 1 mg by mouth Daily. 30 tablet 1     No current facility-administered medications for this visit.       Review of Symptoms:    Review of Systems   Constitutional: Negative.    HENT: Negative.    Eyes: Negative.    Respiratory: Negative.    Cardiovascular: Negative.    Gastrointestinal: Negative.    Genitourinary: Negative.    Musculoskeletal: Negative.    Skin: Negative.    Neurological: Negative.    Psychiatric/Behavioral: Positive for hallucinations and depressed mood. Negative for suicidal ideas. The patient is nervous/anxious.        Objective   Physical  "Exam:   Blood pressure 142/98, pulse 74, temperature 98.4 °F (36.9 °C), height 188 cm (74.02\"), weight 116 kg (255 lb), SpO2 97 %.  Body mass index is 32.73 kg/m².    Appearance: Obese male, appropriately dressed, appears stated age and in no acute distress  Gait, Station, Strength: Within normal limits    Mental Status Exam:   Hygiene:   good  Cooperation:  Cooperative  Eye Contact:  Good  Psychomotor Behavior:  Appropriate  Affect:  Appropriate  Mood: normal  Hopelessness: 7  Speech:  Normal  Thought Process:  Goal directed and Linear  Thought Content:  Normal and Mood congruent  Suicidal:  None  Homicidal:  None  Hallucinations:  Auditory  Delusion:  Paranoid  Memory:  Intact  Orientation:  Person, Place, Time and Situation  Reliability:  good  Insight:  Fair  Judgement:  Fair  Impulse Control:  Good  Physical/Medical Issues:  No      PHQ-Score Total:  PHQ-9 Total Score: 19   Patient screened positive for depression based on a PHQ-9 score of 19 on 11/8/2021. Follow-up recommendations include: Prescribed antidepressant medication treatment and Suicide Risk Assessment performed.        Lab Results:   Lab on 10/29/2021   Component Date Value Ref Range Status   • COVID19 10/29/2021 Not Detected  Not Detected - Ref. Range Final       Assessment/Plan   Diagnoses and all orders for this visit:    1. Generalized anxiety disorder (Primary)  -     FLUoxetine (PROzac) 40 MG capsule; Take 1 capsule by mouth 2 (Two) Times a Day.  Dispense: 60 capsule; Refill: 1  -     hydrOXYzine (ATARAX) 10 MG tablet; Take 1 tablet by mouth 2 (Two) Times a Day As Needed for Anxiety.  Dispense: 60 tablet; Refill: 1  -     traZODone (DESYREL) 50 MG tablet; Take 1 tablet by mouth Every Night.  Dispense: 30 tablet; Refill: 1  -     Brexpiprazole (Rexulti) 1 MG tablet; Take 1 mg by mouth Daily.  Dispense: 30 tablet; Refill: 1    2. Severe episode of recurrent major depressive disorder, with psychotic features (HCC)  -     FLUoxetine (PROzac) 40 " MG capsule; Take 1 capsule by mouth 2 (Two) Times a Day.  Dispense: 60 capsule; Refill: 1  -     Brexpiprazole (Rexulti) 1 MG tablet; Take 1 mg by mouth Daily.  Dispense: 30 tablet; Refill: 1    3. Mood disorder (HCC)  -     FLUoxetine (PROzac) 40 MG capsule; Take 1 capsule by mouth 2 (Two) Times a Day.  Dispense: 60 capsule; Refill: 1    4. Personality disorder, unspecified (HCC)  -     FLUoxetine (PROzac) 40 MG capsule; Take 1 capsule by mouth 2 (Two) Times a Day.  Dispense: 60 capsule; Refill: 1    5. Other insomnia  -     traZODone (DESYREL) 50 MG tablet; Take 1 tablet by mouth Every Night.  Dispense: 30 tablet; Refill: 1    6. PTSD (post-traumatic stress disorder)    7. Medication management        -Increase brexpiprazole 1 mg daily for AVH and paranoia. Lengthy discussion with patient on the possible side effects of antipsychotic medications including increased cholesterol, increased blood sugar, and possibility of weight gain.  Also discussed the need to monitor lab work associated with this.  The risk of muscle movement disorders with this class of medication was also discussed.  -Continue fluoxetine 80 mg daily for anxiety and depression  -Continue trazodone 50 mg nightly for sleep  -Continue hydroxyzine 10 mg twice daily as needed for anxiety  -Encouraged patient to continue therapy  -YADIEL reviewed and appropriate. Patient counseled on use of controlled substances.  -The benefits of a healthy diet and exercise were discussed with patient, especially the positive effects they have on mental health. Patient encouraged to consider lifestyle modification regarding  diet and exercise patterns to maximize results of mental health treatment.  -Reviewed previous available documentation  -Reviewed most recent available labs              Visit Diagnoses:    ICD-10-CM ICD-9-CM   1. Generalized anxiety disorder  F41.1 300.02   2. Severe episode of recurrent major depressive disorder, with psychotic features (HCC)   F33.3 296.34   3. Mood disorder (HCC)  F39 296.90   4. Personality disorder, unspecified (HCC)  F60.9 301.9   5. Other insomnia  G47.09 780.52   6. PTSD (post-traumatic stress disorder)  F43.10 309.81   7. Medication management  Z79.899 V58.69         TREATMENT PLAN/GOALS: Continue supportive psychotherapy efforts and medications as indicated. Treatment and medication options discussed during today's visit. Patient acknowledged and verbally consented to continue with current treatment plan and was educated on the importance of compliance with treatment and follow-up appointments.    MEDICATION ISSUES:    Discussed medication options and treatment plan of prescribed medication as well as the risks, benefits, and side effects including potential falls, possible impaired driving and metabolic adversities among others. Patient is agreeable to call the office with any worsening of symptoms or onset of side effects. Patient is agreeable to call 911 or go to the nearest ER should he/she begin having SI/HI.     MEDS ORDERED DURING VISIT:  New Medications Ordered This Visit   Medications   • FLUoxetine (PROzac) 40 MG capsule     Sig: Take 1 capsule by mouth 2 (Two) Times a Day.     Dispense:  60 capsule     Refill:  1   • hydrOXYzine (ATARAX) 10 MG tablet     Sig: Take 1 tablet by mouth 2 (Two) Times a Day As Needed for Anxiety.     Dispense:  60 tablet     Refill:  1   • traZODone (DESYREL) 50 MG tablet     Sig: Take 1 tablet by mouth Every Night.     Dispense:  30 tablet     Refill:  1   • Brexpiprazole (Rexulti) 1 MG tablet     Sig: Take 1 mg by mouth Daily.     Dispense:  30 tablet     Refill:  1       Return in about 8 weeks (around 1/3/2022), or if symptoms worsen or fail to improve.         Prognosis: Guarded dependent on medication/follow up and treatment plan compliance.  Functionality: pt showing improvements in important areas of daily functioning.     Short-term goals: Patient will adhere to medication regimen  and note continued improvement in symptoms over the next 3 months.   Long-term goals: Patient will be adherent to medication management and psychotherapy with continued improvement in symptoms over the next 6 months          This document has been electronically signed by RUPINDER Gann   November 8, 2021 13:29 EST    Part of this note may be an electronic transcription/translation of spoken language to printed text using the Dragon Dictation System.

## 2021-11-23 ENCOUNTER — OFFICE VISIT (OUTPATIENT)
Dept: FAMILY MEDICINE CLINIC | Facility: CLINIC | Age: 51
End: 2021-11-23

## 2021-11-23 VITALS
BODY MASS INDEX: 32.73 KG/M2 | TEMPERATURE: 97 F | DIASTOLIC BLOOD PRESSURE: 74 MMHG | OXYGEN SATURATION: 98 % | HEART RATE: 87 BPM | SYSTOLIC BLOOD PRESSURE: 124 MMHG | WEIGHT: 255 LBS

## 2021-11-23 DIAGNOSIS — E11.65 TYPE 2 DIABETES MELLITUS WITH HYPERGLYCEMIA, WITHOUT LONG-TERM CURRENT USE OF INSULIN (HCC): ICD-10-CM

## 2021-11-23 DIAGNOSIS — G47.09 OTHER INSOMNIA: ICD-10-CM

## 2021-11-23 DIAGNOSIS — F41.1 GENERALIZED ANXIETY DISORDER: ICD-10-CM

## 2021-11-23 DIAGNOSIS — R19.7 DIARRHEA, UNSPECIFIED TYPE: Primary | ICD-10-CM

## 2021-11-23 PROCEDURE — 99214 OFFICE O/P EST MOD 30 MIN: CPT | Performed by: FAMILY MEDICINE

## 2021-11-23 PROCEDURE — 36415 COLL VENOUS BLD VENIPUNCTURE: CPT | Performed by: FAMILY MEDICINE

## 2021-11-23 PROCEDURE — 80061 LIPID PANEL: CPT | Performed by: FAMILY MEDICINE

## 2021-11-23 PROCEDURE — 83036 HEMOGLOBIN GLYCOSYLATED A1C: CPT | Performed by: FAMILY MEDICINE

## 2021-11-23 PROCEDURE — 85025 COMPLETE CBC W/AUTO DIFF WBC: CPT | Performed by: FAMILY MEDICINE

## 2021-11-23 PROCEDURE — 80053 COMPREHEN METABOLIC PANEL: CPT | Performed by: FAMILY MEDICINE

## 2021-11-23 RX ORDER — PIOGLITAZONEHYDROCHLORIDE 30 MG/1
30 TABLET ORAL DAILY
Qty: 90 TABLET | Refills: 1 | Status: SHIPPED | OUTPATIENT
Start: 2021-11-23 | End: 2022-02-23 | Stop reason: SDUPTHER

## 2021-11-23 RX ORDER — LISINOPRIL 5 MG/1
5 TABLET ORAL DAILY
Qty: 90 TABLET | Refills: 1 | Status: SHIPPED | OUTPATIENT
Start: 2021-11-23 | End: 2022-02-23 | Stop reason: SDUPTHER

## 2021-11-23 RX ORDER — ATORVASTATIN CALCIUM 40 MG/1
40 TABLET, FILM COATED ORAL NIGHTLY
Qty: 90 TABLET | Refills: 1 | Status: SHIPPED | OUTPATIENT
Start: 2021-11-23 | End: 2022-02-23 | Stop reason: SDUPTHER

## 2021-11-23 RX ORDER — GLIPIZIDE AND METFORMIN HCL 5; 500 MG/1; MG/1
1 TABLET, FILM COATED ORAL
Qty: 180 TABLET | Refills: 0 | Status: SHIPPED | OUTPATIENT
Start: 2021-11-23 | End: 2022-02-23 | Stop reason: SDUPTHER

## 2021-11-23 NOTE — PROGRESS NOTES
Subjective   Jamarcus Allison is a 51 y.o. male.   Pt presents today with CC of Diabetes (follow up)      History of Present Illness   1.  Patient is a 51-year-old male here to follow-up on type 2 diabetes.  Previously uncontrolled.  He is taking medical hip 5-500 twice a day.  He is tolerating this medication well, though he does have a history of chronic loose bowel movements that have been worse recently since his cataract surgery.  He did not take any oral antibiotics during this time, only drops.  States that this inhibits his ability to get out and walk like he likes to.  He does not feel as though Metformin is the cause.  He also takes Actos 15 mg daily.  He is tolerating this medication well.  #2 he has anxiety and insomnia for which he is following with psychiatry and is doing well currently.         The following portions of the patient's history were reviewed and updated as appropriate: allergies, current medications, past family history, past medical history, past social history, past surgical history and problem list.    Review of Systems   Constitutional: Negative for chills, fever and unexpected weight loss.   HENT: Negative for congestion and sore throat.    Eyes: Negative for blurred vision and visual disturbance.   Respiratory: Negative for cough and wheezing.    Cardiovascular: Negative for chest pain and palpitations.   Gastrointestinal: Negative for abdominal pain and diarrhea.   Endocrine: Negative for cold intolerance and heat intolerance.   Genitourinary: Negative for dysuria.   Musculoskeletal: Negative for arthralgias and neck stiffness.   Neurological: Negative for dizziness, seizures and syncope.   Psychiatric/Behavioral: Negative for self-injury, suicidal ideas and depressed mood.       Objective   Physical Exam  Vitals and nursing note reviewed.   Constitutional:       Appearance: He is well-developed.   HENT:      Head: Normocephalic and atraumatic.      Right Ear: External ear normal.       Left Ear: External ear normal.      Nose: Nose normal.   Eyes:      Conjunctiva/sclera: Conjunctivae normal.      Pupils: Pupils are equal, round, and reactive to light.   Cardiovascular:      Rate and Rhythm: Normal rate and regular rhythm.      Heart sounds: Normal heart sounds.   Pulmonary:      Effort: Pulmonary effort is normal.      Breath sounds: Normal breath sounds.   Abdominal:      General: Bowel sounds are normal.      Palpations: Abdomen is soft.   Musculoskeletal:      Cervical back: Normal range of motion and neck supple.   Skin:     General: Skin is warm and dry.   Neurological:      Mental Status: He is alert and oriented to person, place, and time.   Psychiatric:         Behavior: Behavior normal.           Assessment/Plan   Diagnoses and all orders for this visit:    1. Diarrhea, unspecified type (Primary)  I do not believe the drops in that ophthalmology put him on has anything to do with this.  Perhaps the stress associated with the cataract surgery.  He has a long history of diarrhea and a reported history of IBS-D.  Recommend symptomatic treatment and bland diet.  If it continues for another few weeks, we may consider holding Metformin.   2. Type 2 diabetes mellitus with hyperglycemia, without long-term current use of insulin (HCC)  -     pioglitazone (ACTOS) 30 MG tablet; Take 1 tablet by mouth Daily.  Dispense: 90 tablet; Refill: 1  -     glipiZIDE-metFORMIN (METAGLIP) 5-500 MG per tablet; Take 1 tablet by mouth 2 (Two) Times a Day Before Meals.  Dispense: 180 tablet; Refill: 0  -     atorvastatin (LIPITOR) 40 MG tablet; Take 1 tablet by mouth Every Night.  Dispense: 90 tablet; Refill: 1  -     lisinopril (PRINIVIL,ZESTRIL) 5 MG tablet; Take 1 tablet by mouth Daily.  Dispense: 90 tablet; Refill: 1  -     Comprehensive Metabolic Panel; Future  -     CBC Auto Differential; Future  -     Hemoglobin A1c; Future  -     Lipid Panel; Future  -     Comprehensive Metabolic Panel  -     CBC Auto  Differential  -     Hemoglobin A1c  -     Lipid Panel  Continue Metformin at the current dose despite potential association with his diarrhea.  Will increase pioglitazone to 30 mg daily.  If his diarrhea worsens in the first few days of starting pioglitazone with a higher dose, we may consider this as a cause, though I doubt it.  He is up until now tolerated everything well.  We will continue current treatment.  He is fasting today so we will get blood work.  3. Generalized anxiety disorder  Follow with specialist  4. Other insomnia                    Patient's Body mass index is 32.73 kg/m². indicating that he is obese (BMI >30). Obesity-related health conditions include the following: diabetes mellitus. Obesity is unchanged. BMI is is above average; BMI management plan is completed. We discussed portion control and increasing exercise..

## 2021-11-24 ENCOUNTER — TELEPHONE (OUTPATIENT)
Dept: FAMILY MEDICINE CLINIC | Facility: CLINIC | Age: 51
End: 2021-11-24

## 2021-11-24 LAB
ALBUMIN SERPL-MCNC: 4.5 G/DL (ref 3.5–5.2)
ALBUMIN/GLOB SERPL: 2 G/DL
ALP SERPL-CCNC: 102 U/L (ref 39–117)
ALT SERPL W P-5'-P-CCNC: 44 U/L (ref 1–41)
ANION GAP SERPL CALCULATED.3IONS-SCNC: 12.1 MMOL/L (ref 5–15)
AST SERPL-CCNC: 26 U/L (ref 1–40)
BASOPHILS # BLD AUTO: 0.05 10*3/MM3 (ref 0–0.2)
BASOPHILS NFR BLD AUTO: 0.6 % (ref 0–1.5)
BILIRUB SERPL-MCNC: 0.7 MG/DL (ref 0–1.2)
BUN SERPL-MCNC: 12 MG/DL (ref 6–20)
BUN/CREAT SERPL: 17.1 (ref 7–25)
CALCIUM SPEC-SCNC: 9.2 MG/DL (ref 8.6–10.5)
CHLORIDE SERPL-SCNC: 101 MMOL/L (ref 98–107)
CHOLEST SERPL-MCNC: 119 MG/DL (ref 0–200)
CO2 SERPL-SCNC: 28.9 MMOL/L (ref 22–29)
CREAT SERPL-MCNC: 0.7 MG/DL (ref 0.76–1.27)
DEPRECATED RDW RBC AUTO: 45.9 FL (ref 37–54)
EOSINOPHIL # BLD AUTO: 0.07 10*3/MM3 (ref 0–0.4)
EOSINOPHIL NFR BLD AUTO: 0.9 % (ref 0.3–6.2)
ERYTHROCYTE [DISTWIDTH] IN BLOOD BY AUTOMATED COUNT: 13.7 % (ref 12.3–15.4)
GFR SERPL CREATININE-BSD FRML MDRD: 119 ML/MIN/1.73
GLOBULIN UR ELPH-MCNC: 2.2 GM/DL
GLUCOSE SERPL-MCNC: 116 MG/DL (ref 65–99)
HBA1C MFR BLD: 8.32 % (ref 4.8–5.6)
HCT VFR BLD AUTO: 48.7 % (ref 37.5–51)
HDLC SERPL-MCNC: 30 MG/DL (ref 40–60)
HGB BLD-MCNC: 15.6 G/DL (ref 13–17.7)
IMM GRANULOCYTES # BLD AUTO: 0.02 10*3/MM3 (ref 0–0.05)
IMM GRANULOCYTES NFR BLD AUTO: 0.2 % (ref 0–0.5)
LDLC SERPL CALC-MCNC: 71 MG/DL (ref 0–100)
LDLC/HDLC SERPL: 2.33 {RATIO}
LYMPHOCYTES # BLD AUTO: 2.16 10*3/MM3 (ref 0.7–3.1)
LYMPHOCYTES NFR BLD AUTO: 26.5 % (ref 19.6–45.3)
MCH RBC QN AUTO: 29.2 PG (ref 26.6–33)
MCHC RBC AUTO-ENTMCNC: 32 G/DL (ref 31.5–35.7)
MCV RBC AUTO: 91 FL (ref 79–97)
MONOCYTES # BLD AUTO: 0.56 10*3/MM3 (ref 0.1–0.9)
MONOCYTES NFR BLD AUTO: 6.9 % (ref 5–12)
NEUTROPHILS NFR BLD AUTO: 5.3 10*3/MM3 (ref 1.7–7)
NEUTROPHILS NFR BLD AUTO: 64.9 % (ref 42.7–76)
NRBC BLD AUTO-RTO: 0 /100 WBC (ref 0–0.2)
PLATELET # BLD AUTO: 280 10*3/MM3 (ref 140–450)
PMV BLD AUTO: 12.6 FL (ref 6–12)
POTASSIUM SERPL-SCNC: 3.8 MMOL/L (ref 3.5–5.2)
PROT SERPL-MCNC: 6.7 G/DL (ref 6–8.5)
RBC # BLD AUTO: 5.35 10*6/MM3 (ref 4.14–5.8)
SODIUM SERPL-SCNC: 142 MMOL/L (ref 136–145)
TRIGL SERPL-MCNC: 96 MG/DL (ref 0–150)
VLDLC SERPL-MCNC: 18 MG/DL (ref 5–40)
WBC NRBC COR # BLD: 8.16 10*3/MM3 (ref 3.4–10.8)

## 2021-11-24 NOTE — TELEPHONE ENCOUNTER
----- Message from Everardo Venegas, DO sent at 11/24/2021 12:46 PM EST -----  No problems on his blood work.  His hemoglobin A1c is getting better.  It was found to be 8.3, this is down from 12.5 about a year ago.  Please keep up the good work.  Please let us know if you have any side effects to your diabetes medications.        Left a message to return call.      Kaycee notified & verbalized understanding.

## 2021-12-06 DIAGNOSIS — E11.65 TYPE 2 DIABETES MELLITUS WITH HYPERGLYCEMIA, WITHOUT LONG-TERM CURRENT USE OF INSULIN (HCC): ICD-10-CM

## 2021-12-07 RX ORDER — PIOGLITAZONEHYDROCHLORIDE 15 MG/1
15 TABLET ORAL DAILY
Qty: 90 TABLET | Refills: 1 | Status: SHIPPED | OUTPATIENT
Start: 2021-12-07 | End: 2022-02-23

## 2022-01-03 ENCOUNTER — OFFICE VISIT (OUTPATIENT)
Dept: PSYCHIATRY | Facility: CLINIC | Age: 52
End: 2022-01-03

## 2022-01-03 VITALS
BODY MASS INDEX: 32.73 KG/M2 | HEIGHT: 74 IN | DIASTOLIC BLOOD PRESSURE: 76 MMHG | WEIGHT: 255 LBS | SYSTOLIC BLOOD PRESSURE: 122 MMHG

## 2022-01-03 DIAGNOSIS — F33.3 SEVERE EPISODE OF RECURRENT MAJOR DEPRESSIVE DISORDER, WITH PSYCHOTIC FEATURES: ICD-10-CM

## 2022-01-03 DIAGNOSIS — F60.9 PERSONALITY DISORDER, UNSPECIFIED: ICD-10-CM

## 2022-01-03 DIAGNOSIS — F41.1 GENERALIZED ANXIETY DISORDER: ICD-10-CM

## 2022-01-03 DIAGNOSIS — G47.09 OTHER INSOMNIA: ICD-10-CM

## 2022-01-03 DIAGNOSIS — F39 MOOD DISORDER: ICD-10-CM

## 2022-01-03 PROCEDURE — 99214 OFFICE O/P EST MOD 30 MIN: CPT | Performed by: NURSE PRACTITIONER

## 2022-01-03 RX ORDER — TRAZODONE HYDROCHLORIDE 50 MG/1
50 TABLET ORAL NIGHTLY
Qty: 30 TABLET | Refills: 2 | Status: SHIPPED | OUTPATIENT
Start: 2022-01-03 | End: 2022-04-04 | Stop reason: SDUPTHER

## 2022-01-03 RX ORDER — HYDROXYZINE HYDROCHLORIDE 10 MG/1
10 TABLET, FILM COATED ORAL 2 TIMES DAILY PRN
Qty: 60 TABLET | Refills: 2 | Status: SHIPPED | OUTPATIENT
Start: 2022-01-03 | End: 2022-04-04 | Stop reason: SDUPTHER

## 2022-01-03 RX ORDER — FLUOXETINE HYDROCHLORIDE 40 MG/1
40 CAPSULE ORAL 2 TIMES DAILY
Qty: 60 CAPSULE | Refills: 2 | Status: SHIPPED | OUTPATIENT
Start: 2022-01-03 | End: 2022-04-04 | Stop reason: SDUPTHER

## 2022-01-03 RX ORDER — BREXPIPRAZOLE 1 MG/1
1 TABLET ORAL DAILY
Qty: 30 TABLET | Refills: 2 | Status: SHIPPED | OUTPATIENT
Start: 2022-01-03 | End: 2022-04-04 | Stop reason: SDUPTHER

## 2022-01-03 NOTE — PROGRESS NOTES
"Subjective   Jamarcus Allison is a 51 y.o. male who presents today for follow up    Chief Complaint:  Paranoia    History of Present Illness: Patient presents as follow up. He states improvement with paranoia and AH since increase of Rexulti. He denies any side effects of medications. Reports he is drawing more than he previously had and is feeling \"more artistic\".   He reports sleep is fair, states his neighbors are loud and often wakes him throughout the night.  Reports appetite is good. States last visit with PCP was good and his A1C is continuing to decrease. He reports feeling some paranoia. Reports hearing his name called at times. He denies SI/HI.    The following portions of the patient's history were reviewed and updated as appropriate: allergies, current medications, past family history, past medical history, past social history, past surgical history and problem list.      Past Medical History:  Past Medical History:   Diagnosis Date   • Depression    • Diabetes mellitus (HCC)    • Diabetic neuropathy (HCC)    • IBS (irritable bowel syndrome)    • Peripheral artery disease (HCC)    • PTSD (post-traumatic stress disorder)    • Schizoid personality (HCC)        Social History:  Social History     Socioeconomic History   • Marital status: Single   Tobacco Use   • Smoking status: Never Smoker   • Smokeless tobacco: Never Used   Vaping Use   • Vaping Use: Never used   Substance and Sexual Activity   • Alcohol use: Never   • Drug use: Never   • Sexual activity: Defer       Family History:  Family History   Problem Relation Age of Onset   • Heart disease Mother    • Diabetes Mother    • Cancer Father    • Alcohol abuse Father    • Heart disease Sister    • Diabetes Sister    • Cancer Sister    • Thyroid disease Sister    • Depression Sister    • Anxiety disorder Sister    • Depression Brother    • Anxiety disorder Brother    • Alcohol abuse Brother        Past Surgical History:  Past Surgical History:   Procedure " Laterality Date   • CATARACT EXTRACTION     • EYE SURGERY     • POLYPECTOMY         Problem List:  Patient Active Problem List   Diagnosis   • Type 2 diabetes mellitus with hyperglycemia, without long-term current use of insulin (HCC)       Allergy:   Allergies   Allergen Reactions   • Poison Ivy Extract Anaphylaxis   • Poison Oak Extract Anaphylaxis   • Sumac Anaphylaxis        Current Medications:   Current Outpatient Medications   Medication Sig Dispense Refill   • atorvastatin (LIPITOR) 40 MG tablet Take 1 tablet by mouth Every Night. 90 tablet 1   • Blood Glucose Monitoring Suppl (B-D LOGIC BLOOD GLUCOSE) w/Device kit 1 Device Daily. 1 each 0   • Brexpiprazole (Rexulti) 1 MG tablet Take 1 mg by mouth Daily. 30 tablet 2   • FLUoxetine (PROzac) 40 MG capsule Take 1 capsule by mouth 2 (Two) Times a Day. 60 capsule 2   • glipiZIDE-metFORMIN (METAGLIP) 5-500 MG per tablet Take 1 tablet by mouth 2 (Two) Times a Day Before Meals. 180 tablet 0   • glucose blood test strip Check daily 200 each 12   • hydrOXYzine (ATARAX) 10 MG tablet Take 1 tablet by mouth 2 (Two) Times a Day As Needed for Anxiety. 60 tablet 2   • Lancets (freestyle) lancets Check daily fasting 200 each 12   • lisinopril (PRINIVIL,ZESTRIL) 5 MG tablet Take 1 tablet by mouth Daily. 90 tablet 1   • loratadine (CLARITIN) 10 MG tablet Take 1 tablet by mouth Daily. 90 tablet 3   • pioglitazone (ACTOS) 15 MG tablet TAKE 1 TABLET BY MOUTH DAILY 90 tablet 1   • pioglitazone (ACTOS) 30 MG tablet Take 1 tablet by mouth Daily. 90 tablet 1   • traZODone (DESYREL) 50 MG tablet Take 1 tablet by mouth Every Night. 30 tablet 2     No current facility-administered medications for this visit.       Review of Symptoms:    Review of Systems   Constitutional: Positive for fatigue.   HENT: Negative.    Eyes: Negative.    Respiratory: Negative.    Cardiovascular: Negative.    Gastrointestinal: Negative.    Genitourinary: Negative.    Musculoskeletal: Negative.    Skin:  "Negative.    Neurological: Negative.    Psychiatric/Behavioral: Positive for decreased concentration, hallucinations, sleep disturbance and depressed mood. Negative for suicidal ideas. The patient is nervous/anxious.        Objective   Physical Exam:   Blood pressure 122/76, height 188 cm (74.02\"), weight 116 kg (255 lb).  Body mass index is 32.73 kg/m².    Appearance: Obese male, appropriately dressed, appears stated age and in no acute distress  Gait, Station, Strength: WNL    Mental Status Exam:   Hygiene:   good  Cooperation:  Cooperative  Eye Contact:  Good  Psychomotor Behavior:  Appropriate  Affect:  Appropriate  Mood: normal  Hopelessness: 3  Speech:  Minimal  Thought Process:  Linear  Thought Content:  Normal and Mood congruent  Suicidal:  None  Homicidal:  None  Hallucinations:  Auditory  Delusion:  Paranoid  Memory:  Intact  Orientation:  Person, Place, Time and Situation  Reliability:  good  Insight:  Fair  Judgement:  Fair  Impulse Control:  Fair  Physical/Medical Issues:  No      PHQ-Score Total:  PHQ-9 Total Score: 4   Patient screened positive for depression based on a PHQ-9 score of 4 on 1/3/2022. Follow-up recommendations include: Prescribed antidepressant medication treatment and Suicide Risk Assessment performed.    GIULIANO-7  Feeling nervous, anxious or on edge: More than half the days  Not being able to stop or control worrying: More than half the days  Worrying too much about different things: More than half the days  Trouble Relaxing: More than half the days  Being so restless that it is hard to sit still: Not at all  Feeling afraid as if something awful might happen: Several days  Becoming easily annoyed or irritable: More than half the days  GIULIANO 7 Total Score: 11  If you checked any problems, how difficult have these problems made it for you to do your work, take care of things at home, or get along with other people: Somewhat difficult      Lab Results:   No visits with results within 1 " Month(s) from this visit.   Latest known visit with results is:   Office Visit on 11/23/2021   Component Date Value Ref Range Status   • Glucose 11/23/2021 116* 65 - 99 mg/dL Final   • BUN 11/23/2021 12  6 - 20 mg/dL Final   • Creatinine 11/23/2021 0.70* 0.76 - 1.27 mg/dL Final   • Sodium 11/23/2021 142  136 - 145 mmol/L Final   • Potassium 11/23/2021 3.8  3.5 - 5.2 mmol/L Final   • Chloride 11/23/2021 101  98 - 107 mmol/L Final   • CO2 11/23/2021 28.9  22.0 - 29.0 mmol/L Final   • Calcium 11/23/2021 9.2  8.6 - 10.5 mg/dL Final   • Total Protein 11/23/2021 6.7  6.0 - 8.5 g/dL Final   • Albumin 11/23/2021 4.50  3.50 - 5.20 g/dL Final   • ALT (SGPT) 11/23/2021 44* 1 - 41 U/L Final   • AST (SGOT) 11/23/2021 26  1 - 40 U/L Final   • Alkaline Phosphatase 11/23/2021 102  39 - 117 U/L Final   • Total Bilirubin 11/23/2021 0.7  0.0 - 1.2 mg/dL Final   • eGFR Non  Amer 11/23/2021 119  >60 mL/min/1.73 Final   • Globulin 11/23/2021 2.2  gm/dL Final   • A/G Ratio 11/23/2021 2.0  g/dL Final   • BUN/Creatinine Ratio 11/23/2021 17.1  7.0 - 25.0 Final   • Anion Gap 11/23/2021 12.1  5.0 - 15.0 mmol/L Final   • WBC 11/23/2021 8.16  3.40 - 10.80 10*3/mm3 Final   • RBC 11/23/2021 5.35  4.14 - 5.80 10*6/mm3 Final   • Hemoglobin 11/23/2021 15.6  13.0 - 17.7 g/dL Final   • Hematocrit 11/23/2021 48.7  37.5 - 51.0 % Final   • MCV 11/23/2021 91.0  79.0 - 97.0 fL Final   • MCH 11/23/2021 29.2  26.6 - 33.0 pg Final   • MCHC 11/23/2021 32.0  31.5 - 35.7 g/dL Final   • RDW 11/23/2021 13.7  12.3 - 15.4 % Final   • RDW-SD 11/23/2021 45.9  37.0 - 54.0 fl Final   • MPV 11/23/2021 12.6* 6.0 - 12.0 fL Final   • Platelets 11/23/2021 280  140 - 450 10*3/mm3 Final   • Neutrophil % 11/23/2021 64.9  42.7 - 76.0 % Final   • Lymphocyte % 11/23/2021 26.5  19.6 - 45.3 % Final   • Monocyte % 11/23/2021 6.9  5.0 - 12.0 % Final   • Eosinophil % 11/23/2021 0.9  0.3 - 6.2 % Final   • Basophil % 11/23/2021 0.6  0.0 - 1.5 % Final   • Immature Grans % 11/23/2021  0.2  0.0 - 0.5 % Final   • Neutrophils, Absolute 11/23/2021 5.30  1.70 - 7.00 10*3/mm3 Final   • Lymphocytes, Absolute 11/23/2021 2.16  0.70 - 3.10 10*3/mm3 Final   • Monocytes, Absolute 11/23/2021 0.56  0.10 - 0.90 10*3/mm3 Final   • Eosinophils, Absolute 11/23/2021 0.07  0.00 - 0.40 10*3/mm3 Final   • Basophils, Absolute 11/23/2021 0.05  0.00 - 0.20 10*3/mm3 Final   • Immature Grans, Absolute 11/23/2021 0.02  0.00 - 0.05 10*3/mm3 Final   • nRBC 11/23/2021 0.0  0.0 - 0.2 /100 WBC Final   • Hemoglobin A1C 11/23/2021 8.32* 4.80 - 5.60 % Final   • Total Cholesterol 11/23/2021 119  0 - 200 mg/dL Final   • Triglycerides 11/23/2021 96  0 - 150 mg/dL Final   • HDL Cholesterol 11/23/2021 30* 40 - 60 mg/dL Final   • LDL Cholesterol  11/23/2021 71  0 - 100 mg/dL Final   • VLDL Cholesterol 11/23/2021 18  5 - 40 mg/dL Final   • LDL/HDL Ratio 11/23/2021 2.33   Final       Assessment/Plan   Diagnoses and all orders for this visit:    1. Generalized anxiety disorder  -     Brexpiprazole (Rexulti) 1 MG tablet; Take 1 mg by mouth Daily.  Dispense: 30 tablet; Refill: 2  -     FLUoxetine (PROzac) 40 MG capsule; Take 1 capsule by mouth 2 (Two) Times a Day.  Dispense: 60 capsule; Refill: 2  -     hydrOXYzine (ATARAX) 10 MG tablet; Take 1 tablet by mouth 2 (Two) Times a Day As Needed for Anxiety.  Dispense: 60 tablet; Refill: 2  -     traZODone (DESYREL) 50 MG tablet; Take 1 tablet by mouth Every Night.  Dispense: 30 tablet; Refill: 2    2. Severe episode of recurrent major depressive disorder, with psychotic features (HCC)  -     Brexpiprazole (Rexulti) 1 MG tablet; Take 1 mg by mouth Daily.  Dispense: 30 tablet; Refill: 2  -     FLUoxetine (PROzac) 40 MG capsule; Take 1 capsule by mouth 2 (Two) Times a Day.  Dispense: 60 capsule; Refill: 2    3. Mood disorder (HCC)  -     FLUoxetine (PROzac) 40 MG capsule; Take 1 capsule by mouth 2 (Two) Times a Day.  Dispense: 60 capsule; Refill: 2    4. Personality disorder, unspecified (HCC)  -      FLUoxetine (PROzac) 40 MG capsule; Take 1 capsule by mouth 2 (Two) Times a Day.  Dispense: 60 capsule; Refill: 2    5. Other insomnia  -     traZODone (DESYREL) 50 MG tablet; Take 1 tablet by mouth Every Night.  Dispense: 30 tablet; Refill: 2        -Continue brexpiprazole 1 mg daily for AVH and paranoia. Lengthy discussion with patient on the possible side effects of antipsychotic medications including increased cholesterol, increased blood sugar, and possibility of weight gain.  Also discussed the need to monitor lab work associated with this.  The risk of muscle movement disorders with this class of medication was also discussed.  -Continue fluoxetine 80 mg daily for anxiety and depression  -Continue trazodone 50 mg nightly for sleep  -Continue hydroxyzine 10 mg twice daily as needed for anxiety  -Encouraged patient to continue therapy  -YADIEL reviewed and appropriate. Patient counseled on use of controlled substances.  -The benefits of a healthy diet and exercise were discussed with patient, especially the positive effects they have on mental health. Patient encouraged to consider lifestyle modification regarding  diet and exercise patterns to maximize results of mental health treatment.  -Reviewed previous available documentation  -Reviewed most recent available labs             Visit Diagnoses:    ICD-10-CM ICD-9-CM   1. Generalized anxiety disorder  F41.1 300.02   2. Severe episode of recurrent major depressive disorder, with psychotic features (HCC)  F33.3 296.34   3. Mood disorder ([facility])  F39 296.90   4. Personality disorder, unspecified (HCC)  F60.9 301.9   5. Other insomnia  G47.09 780.52         TREATMENT PLAN/GOALS: Continue supportive psychotherapy efforts and medications as indicated. Treatment and medication options discussed during today's visit. Patient acknowledged and verbally consented to continue with current treatment plan and was educated on the importance of compliance with treatment and  follow-up appointments.    MEDICATION ISSUES:    Discussed medication options and treatment plan of prescribed medication as well as the risks, benefits, and side effects including potential falls, possible impaired driving and metabolic adversities among others. Patient is agreeable to call the office with any worsening of symptoms or onset of side effects. Patient is agreeable to call 911 or go to the nearest ER should he/she begin having SI/HI.     MEDS ORDERED DURING VISIT:  New Medications Ordered This Visit   Medications   • Brexpiprazole (Rexulti) 1 MG tablet     Sig: Take 1 mg by mouth Daily.     Dispense:  30 tablet     Refill:  2   • FLUoxetine (PROzac) 40 MG capsule     Sig: Take 1 capsule by mouth 2 (Two) Times a Day.     Dispense:  60 capsule     Refill:  2   • hydrOXYzine (ATARAX) 10 MG tablet     Sig: Take 1 tablet by mouth 2 (Two) Times a Day As Needed for Anxiety.     Dispense:  60 tablet     Refill:  2   • traZODone (DESYREL) 50 MG tablet     Sig: Take 1 tablet by mouth Every Night.     Dispense:  30 tablet     Refill:  2       Return in about 3 months (around 4/3/2022).         Prognosis: Guarded dependent on medication/follow up and treatment plan compliance.  Functionality: pt showing improvements in important areas of daily functioning.     Short-term goals: Patient will adhere to medication regimen and note continued improvement in symptoms over the next 3 months.   Long-term goals: Patient will be adherent to medication management and psychotherapy with continued improvement in symptoms over the next 6 months          This document has been electronically signed by RUPINDER Gann   January 3, 2022 14:39 EST    Part of this note may be an electronic transcription/translation of spoken language to printed text using the Dragon Dictation System.

## 2022-01-05 RX ORDER — BREXPIPRAZOLE 1 MG/1
TABLET ORAL
Qty: 30 TABLET | Refills: 2 | OUTPATIENT
Start: 2022-01-05

## 2022-01-05 RX ORDER — HYDROXYZINE HYDROCHLORIDE 10 MG/1
TABLET, FILM COATED ORAL
Qty: 60 TABLET | Refills: 2 | OUTPATIENT
Start: 2022-01-05

## 2022-01-05 RX ORDER — TRAZODONE HYDROCHLORIDE 50 MG/1
50 TABLET ORAL NIGHTLY
Qty: 30 TABLET | Refills: 2 | OUTPATIENT
Start: 2022-01-05

## 2022-02-23 ENCOUNTER — OFFICE VISIT (OUTPATIENT)
Dept: FAMILY MEDICINE CLINIC | Facility: CLINIC | Age: 52
End: 2022-02-23

## 2022-02-23 VITALS
DIASTOLIC BLOOD PRESSURE: 72 MMHG | HEART RATE: 73 BPM | HEIGHT: 74 IN | TEMPERATURE: 96.9 F | OXYGEN SATURATION: 96 % | WEIGHT: 236.6 LBS | BODY MASS INDEX: 30.36 KG/M2 | SYSTOLIC BLOOD PRESSURE: 124 MMHG

## 2022-02-23 DIAGNOSIS — E11.65 TYPE 2 DIABETES MELLITUS WITH HYPERGLYCEMIA, WITHOUT LONG-TERM CURRENT USE OF INSULIN: Primary | ICD-10-CM

## 2022-02-23 DIAGNOSIS — K58.0 IRRITABLE BOWEL SYNDROME WITH DIARRHEA: ICD-10-CM

## 2022-02-23 DIAGNOSIS — J30.2 SEASONAL ALLERGIES: ICD-10-CM

## 2022-02-23 PROCEDURE — 99214 OFFICE O/P EST MOD 30 MIN: CPT | Performed by: FAMILY MEDICINE

## 2022-02-23 RX ORDER — LORATADINE 10 MG/1
10 TABLET ORAL DAILY
Qty: 90 TABLET | Refills: 0 | Status: SHIPPED | OUTPATIENT
Start: 2022-02-23 | End: 2022-05-23 | Stop reason: SDUPTHER

## 2022-02-23 RX ORDER — LANCETS 28 GAUGE
EACH MISCELLANEOUS
Qty: 200 EACH | Refills: 12 | Status: SHIPPED | OUTPATIENT
Start: 2022-02-23

## 2022-02-23 RX ORDER — ATORVASTATIN CALCIUM 40 MG/1
40 TABLET, FILM COATED ORAL NIGHTLY
Qty: 90 TABLET | Refills: 1 | Status: SHIPPED | OUTPATIENT
Start: 2022-02-23 | End: 2022-05-23 | Stop reason: SDUPTHER

## 2022-02-23 RX ORDER — LISINOPRIL 5 MG/1
5 TABLET ORAL DAILY
Qty: 90 TABLET | Refills: 1 | Status: SHIPPED | OUTPATIENT
Start: 2022-02-23 | End: 2022-05-23 | Stop reason: SDUPTHER

## 2022-02-23 RX ORDER — GLIPIZIDE AND METFORMIN HCL 5; 500 MG/1; MG/1
1 TABLET, FILM COATED ORAL
Qty: 180 TABLET | Refills: 0 | Status: SHIPPED | OUTPATIENT
Start: 2022-02-23 | End: 2022-05-23 | Stop reason: SDUPTHER

## 2022-02-23 RX ORDER — PIOGLITAZONEHYDROCHLORIDE 45 MG/1
45 TABLET ORAL DAILY
Qty: 90 TABLET | Refills: 0 | Status: SHIPPED | OUTPATIENT
Start: 2022-02-23 | End: 2022-05-23 | Stop reason: SDUPTHER

## 2022-02-23 NOTE — PROGRESS NOTES
Subjective   Jamarcus Allison is a 52 y.o. male.   Pt presents today with CC of Diabetes      History of Present Illness   1.  Patient is a 52-year-old male with type 2 diabetes.  Historically, it has been very poorly controlled.  His hemoglobin A1c was in the eights last time, though he is concerned it may have gone back up.  Currently he takes Metaglip 5-500 twice a day.  Higher doses of Metformin in the past have worsened diarrhea.  He also has been taking Actos 30 mg daily, of note Actos was increased from 15 up to 30 3 months ago.  He has tolerated it well.  Of note, he has lost 20 pounds unintentionally since his last visit.  #2 he has chronic diarrhea.  It comes and goes, he takes Imodium as needed for this.  He does not leave the house much because of agoraphobia.  Diarrhea is not a major concern for him.  It does not affect his quality of life.           The following portions of the patient's history were reviewed and updated as appropriate: allergies, current medications, past family history, past medical history, past social history, past surgical history and problem list.    Review of Systems   Constitutional: Negative for chills, fever and unexpected weight loss.   HENT: Negative for congestion and sore throat.    Eyes: Negative for blurred vision and visual disturbance.   Respiratory: Negative for cough and wheezing.    Cardiovascular: Negative for chest pain and palpitations.   Gastrointestinal: Positive for diarrhea. Negative for abdominal pain.   Endocrine: Negative for cold intolerance and heat intolerance.   Genitourinary: Negative for dysuria.   Musculoskeletal: Negative for arthralgias and neck stiffness.   Neurological: Negative for dizziness, seizures and syncope.   Psychiatric/Behavioral: Negative for self-injury, suicidal ideas and depressed mood.       Objective   Physical Exam  Vitals and nursing note reviewed.   Constitutional:       Appearance: He is well-developed.   HENT:      Head:  Normocephalic and atraumatic.      Right Ear: External ear normal.      Left Ear: External ear normal.      Nose: Nose normal.   Eyes:      Conjunctiva/sclera: Conjunctivae normal.      Pupils: Pupils are equal, round, and reactive to light.   Cardiovascular:      Rate and Rhythm: Normal rate and regular rhythm.      Heart sounds: Normal heart sounds.   Pulmonary:      Effort: Pulmonary effort is normal.      Breath sounds: Normal breath sounds.   Abdominal:      General: Bowel sounds are normal.      Palpations: Abdomen is soft.   Musculoskeletal:      Cervical back: Normal range of motion and neck supple.   Skin:     General: Skin is warm and dry.   Neurological:      Mental Status: He is alert and oriented to person, place, and time.   Psychiatric:         Behavior: Behavior normal.           Assessment/Plan   Diagnoses and all orders for this visit:    1. Type 2 diabetes mellitus with hyperglycemia, without long-term current use of insulin ([facility]) (Primary)  -     Comprehensive Metabolic Panel; Future  -     CBC Auto Differential; Future  -     Hemoglobin A1c; Future  -     Lancets (freestyle) lancets; Check daily fasting  Dispense: 200 each; Refill: 12  -     glucose blood test strip; Check daily  Dispense: 200 each; Refill: 12  -     glipiZIDE-metFORMIN (METAGLIP) 5-500 MG per tablet; Take 1 tablet by mouth 2 (Two) Times a Day Before Meals.  Dispense: 180 tablet; Refill: 0  -     pioglitazone (ACTOS) 45 MG tablet; Take 1 tablet by mouth Daily.  Dispense: 90 tablet; Refill: 0  -     atorvastatin (LIPITOR) 40 MG tablet; Take 1 tablet by mouth Every Night.  Dispense: 90 tablet; Refill: 1  -     lisinopril (PRINIVIL,ZESTRIL) 5 MG tablet; Take 1 tablet by mouth Daily.  Dispense: 90 tablet; Refill: 1  We will increase Actos from 30 mg up to 45 mg.  If his hemoglobin A1c is still above 8, will start Jardiance 10 mg, or something comparable.  2. Seasonal allergies  -     loratadine (CLARITIN) 10 MG tablet; Take 1 tablet  by mouth Daily.  Dispense: 90 tablet; Refill: 0    3. Irritable bowel syndrome with diarrhea  Recommend current treatment.  As long as he is not requiring Imodium more than a couple days a week, no concerns.  He does not want to start any other medication.                  Patient's Body mass index is 195.87 kg/m². indicating that he is obese (BMI >30). Obesity-related health conditions include the following: diabetes mellitus. Obesity is unchanged. BMI is is above average; BMI management plan is completed. We discussed portion control and increasing exercise..

## 2022-03-04 DIAGNOSIS — F33.3 SEVERE EPISODE OF RECURRENT MAJOR DEPRESSIVE DISORDER, WITH PSYCHOTIC FEATURES: ICD-10-CM

## 2022-03-04 DIAGNOSIS — F41.1 GENERALIZED ANXIETY DISORDER: ICD-10-CM

## 2022-03-04 RX ORDER — BREXPIPRAZOLE 0.5 MG/1
TABLET ORAL
Qty: 30 TABLET | Refills: 0 | OUTPATIENT
Start: 2022-03-04

## 2022-04-04 ENCOUNTER — OFFICE VISIT (OUTPATIENT)
Dept: PSYCHIATRY | Facility: CLINIC | Age: 52
End: 2022-04-04

## 2022-04-04 VITALS
HEIGHT: 74 IN | HEART RATE: 89 BPM | BODY MASS INDEX: 34.52 KG/M2 | DIASTOLIC BLOOD PRESSURE: 78 MMHG | WEIGHT: 269 LBS | SYSTOLIC BLOOD PRESSURE: 130 MMHG

## 2022-04-04 DIAGNOSIS — F41.1 GENERALIZED ANXIETY DISORDER: ICD-10-CM

## 2022-04-04 DIAGNOSIS — F60.1 SCHIZOID PERSONALITY DISORDER IN ADULT: Primary | ICD-10-CM

## 2022-04-04 DIAGNOSIS — Z79.899 MEDICATION MANAGEMENT: ICD-10-CM

## 2022-04-04 DIAGNOSIS — G47.09 OTHER INSOMNIA: ICD-10-CM

## 2022-04-04 PROCEDURE — 99214 OFFICE O/P EST MOD 30 MIN: CPT | Performed by: NURSE PRACTITIONER

## 2022-04-04 RX ORDER — TRAZODONE HYDROCHLORIDE 50 MG/1
50 TABLET ORAL NIGHTLY
Qty: 30 TABLET | Refills: 2 | Status: SHIPPED | OUTPATIENT
Start: 2022-04-04 | End: 2022-06-27 | Stop reason: SDUPTHER

## 2022-04-04 RX ORDER — BREXPIPRAZOLE 1 MG/1
1 TABLET ORAL DAILY
Qty: 30 TABLET | Refills: 2 | Status: SHIPPED | OUTPATIENT
Start: 2022-04-04 | End: 2022-06-27 | Stop reason: SDUPTHER

## 2022-04-04 RX ORDER — FLUOXETINE HYDROCHLORIDE 40 MG/1
80 CAPSULE ORAL DAILY
Qty: 60 CAPSULE | Refills: 2 | Status: SHIPPED | OUTPATIENT
Start: 2022-04-04 | End: 2022-06-27 | Stop reason: SDUPTHER

## 2022-04-04 RX ORDER — HYDROXYZINE HYDROCHLORIDE 10 MG/1
10 TABLET, FILM COATED ORAL 2 TIMES DAILY PRN
Qty: 60 TABLET | Refills: 2 | Status: SHIPPED | OUTPATIENT
Start: 2022-04-04 | End: 2022-06-27 | Stop reason: SDUPTHER

## 2022-04-04 NOTE — PROGRESS NOTES
"Subjective   Jamarcus Allison is a 52 y.o. male who presents today for follow up    Chief Complaint:  Paranoia    History of Present Illness: Patient presents as follow up. States he has been out of fluoxetine for 2 weeks and has been having increase of depression and anxiety. States he also has been having increase of AH that includes hearing a cat that he had in the past. Reports he has not drawing as much as before due to increase with depression.  He reports sleep is fair, states his neighbors are loud and often wakes him throughout the night.  Reports appetite is good, per chart review he has gained 30 pounds. Reports he has not been able to walk as before due to children moving in the neighborhood and \"they are really loud\".  He reports feeling some paranoia.  He denies SI/HI.    The following portions of the patient's history were reviewed and updated as appropriate: allergies, current medications, past family history, past medical history, past social history, past surgical history and problem list.      Past Medical History:  Past Medical History:   Diagnosis Date   • Depression    • Diabetes mellitus (HCC)    • Diabetic neuropathy (HCC)    • IBS (irritable bowel syndrome)    • Peripheral artery disease (HCC)    • PTSD (post-traumatic stress disorder)    • Schizoid personality (HCC)        Social History:  Social History     Socioeconomic History   • Marital status: Single   Tobacco Use   • Smoking status: Never Smoker   • Smokeless tobacco: Never Used   Vaping Use   • Vaping Use: Never used   Substance and Sexual Activity   • Alcohol use: Never   • Drug use: Never   • Sexual activity: Defer       Family History:  Family History   Problem Relation Age of Onset   • Heart disease Mother    • Diabetes Mother    • Cancer Father    • Alcohol abuse Father    • Heart disease Sister    • Diabetes Sister    • Cancer Sister    • Thyroid disease Sister    • Depression Sister    • Anxiety disorder Sister    • Depression " Brother    • Anxiety disorder Brother    • Alcohol abuse Brother        Past Surgical History:  Past Surgical History:   Procedure Laterality Date   • CATARACT EXTRACTION     • EYE SURGERY     • POLYPECTOMY         Problem List:  Patient Active Problem List   Diagnosis   • Type 2 diabetes mellitus with hyperglycemia, without long-term current use of insulin ([facility])       Allergy:   Allergies   Allergen Reactions   • Poison Ivy Extract Anaphylaxis   • Poison Oak Extract Anaphylaxis   • Sumac Anaphylaxis        Current Medications:   Current Outpatient Medications   Medication Sig Dispense Refill   • atorvastatin (LIPITOR) 40 MG tablet Take 1 tablet by mouth Every Night. 90 tablet 1   • Blood Glucose Monitoring Suppl (B-D LOGIC BLOOD GLUCOSE) w/Device kit 1 Device Daily. 1 each 0   • Brexpiprazole (Rexulti) 1 MG tablet Take 1 mg by mouth Daily. 30 tablet 2   • FLUoxetine (PROzac) 40 MG capsule Take 2 capsules by mouth Daily. 60 capsule 2   • glipiZIDE-metFORMIN (METAGLIP) 5-500 MG per tablet Take 1 tablet by mouth 2 (Two) Times a Day Before Meals. 180 tablet 0   • glucose blood test strip Check daily 200 each 12   • hydrOXYzine (ATARAX) 10 MG tablet Take 1 tablet by mouth 2 (Two) Times a Day As Needed for Anxiety. 60 tablet 2   • Lancets (freestyle) lancets Check daily fasting 200 each 12   • lisinopril (PRINIVIL,ZESTRIL) 5 MG tablet Take 1 tablet by mouth Daily. 90 tablet 1   • loratadine (CLARITIN) 10 MG tablet Take 1 tablet by mouth Daily. 90 tablet 0   • pioglitazone (ACTOS) 45 MG tablet Take 1 tablet by mouth Daily. 90 tablet 0   • traZODone (DESYREL) 50 MG tablet Take 1 tablet by mouth Every Night. 30 tablet 2     No current facility-administered medications for this visit.       Review of Symptoms:    Review of Systems   Constitutional: Negative.    HENT: Negative.    Eyes: Negative.    Respiratory: Negative.    Cardiovascular: Negative.    Gastrointestinal: Negative.    Genitourinary: Negative.   "  Musculoskeletal: Negative.    Skin: Negative.    Neurological: Negative.    Psychiatric/Behavioral: Positive for agitation, hallucinations, depressed mood and stress. Negative for suicidal ideas. The patient is nervous/anxious.        Objective   Physical Exam:   Blood pressure 130/78, pulse 89, height 188 cm (74.02\"), weight 122 kg (269 lb).  Body mass index is 34.52 kg/m².    Appearance: Obese male, appropriately dressed, appears stated age and in no acute distress  Gait, Station, Strength: WNL    Mental Status Exam:   Hygiene:   good  Cooperation:  Cooperative  Eye Contact:  Good  Psychomotor Behavior:  Appropriate  Affect:  Appropriate  Mood: normal  Hopelessness: 3  Speech:  Minimal  Thought Process:  Linear  Thought Content:  Normal and Mood congruent  Suicidal:  None  Homicidal:  None  Hallucinations:  Auditory  Delusion:  Paranoid  Memory:  Intact  Orientation:  Person, Place, Time and Situation  Reliability:  good  Insight:  Fair  Judgement:  Fair  Impulse Control:  Fair  Physical/Medical Issues:  No      PHQ-Score Total:  PHQ-9 Total Score:  7   Patient screened positive for depression based on a PHQ-9 score of 7 on 4/4/2022. Follow-up recommendations include: Prescribed antidepressant medication treatment and Suicide Risk Assessment performed.    [id number]         Lab Results:   No visits with results within 1 Month(s) from this visit.   Latest known visit with results is:   Office Visit on 11/23/2021   Component Date Value Ref Range Status   • Glucose 11/23/2021 116 (A) 65 - 99 mg/dL Final   • BUN 11/23/2021 12  6 - 20 mg/dL Final   • Creatinine 11/23/2021 0.70 (A) 0.76 - 1.27 mg/dL Final   • Sodium 11/23/2021 142  136 - 145 mmol/L Final   • Potassium 11/23/2021 3.8  3.5 - 5.2 mmol/L Final   • Chloride 11/23/2021 101  98 - 107 mmol/L Final   • CO2 11/23/2021 28.9  22.0 - 29.0 mmol/L Final   • Calcium 11/23/2021 9.2  8.6 - 10.5 mg/dL Final   • Total Protein 11/23/2021 6.7  6.0 - 8.5 g/dL Final   • Albumin " 11/23/2021 4.50  3.50 - 5.20 g/dL Final   • ALT (SGPT) 11/23/2021 44 (A) 1 - 41 U/L Final   • AST (SGOT) 11/23/2021 26  1 - 40 U/L Final   • Alkaline Phosphatase 11/23/2021 102  39 - 117 U/L Final   • Total Bilirubin 11/23/2021 0.7  0.0 - 1.2 mg/dL Final   • eGFR Non  Amer 11/23/2021 119  >60 mL/min/1.73 Final   • Globulin 11/23/2021 2.2  gm/dL Final   • A/G Ratio 11/23/2021 2.0  g/dL Final   • BUN/Creatinine Ratio 11/23/2021 17.1  7.0 - 25.0 Final   • Anion Gap 11/23/2021 12.1  5.0 - 15.0 mmol/L Final   • WBC 11/23/2021 8.16  3.40 - 10.80 10*3/mm3 Final   • RBC 11/23/2021 5.35  4.14 - 5.80 10*6/mm3 Final   • Hemoglobin 11/23/2021 15.6  13.0 - 17.7 g/dL Final   • Hematocrit 11/23/2021 48.7  37.5 - 51.0 % Final   • MCV 11/23/2021 91.0  79.0 - 97.0 fL Final   • MCH 11/23/2021 29.2  26.6 - 33.0 pg Final   • MCHC 11/23/2021 32.0  31.5 - 35.7 g/dL Final   • RDW 11/23/2021 13.7  12.3 - 15.4 % Final   • RDW-SD 11/23/2021 45.9  37.0 - 54.0 fl Final   • MPV 11/23/2021 12.6 (A) 6.0 - 12.0 fL Final   • Platelets 11/23/2021 280  140 - 450 10*3/mm3 Final   • Neutrophil % 11/23/2021 64.9  42.7 - 76.0 % Final   • Lymphocyte % 11/23/2021 26.5  19.6 - 45.3 % Final   • Monocyte % 11/23/2021 6.9  5.0 - 12.0 % Final   • Eosinophil % 11/23/2021 0.9  0.3 - 6.2 % Final   • Basophil % 11/23/2021 0.6  0.0 - 1.5 % Final   • Immature Grans % 11/23/2021 0.2  0.0 - 0.5 % Final   • Neutrophils, Absolute 11/23/2021 5.30  1.70 - 7.00 10*3/mm3 Final   • Lymphocytes, Absolute 11/23/2021 2.16  0.70 - 3.10 10*3/mm3 Final   • Monocytes, Absolute 11/23/2021 0.56  0.10 - 0.90 10*3/mm3 Final   • Eosinophils, Absolute 11/23/2021 0.07  0.00 - 0.40 10*3/mm3 Final   • Basophils, Absolute 11/23/2021 0.05  0.00 - 0.20 10*3/mm3 Final   • Immature Grans, Absolute 11/23/2021 0.02  0.00 - 0.05 10*3/mm3 Final   • nRBC 11/23/2021 0.0  0.0 - 0.2 /100 WBC Final   • Hemoglobin A1C 11/23/2021 8.32 (A) 4.80 - 5.60 % Final   • Total Cholesterol 11/23/2021 119  0 -  200 mg/dL Final   • Triglycerides 11/23/2021 96  0 - 150 mg/dL Final   • HDL Cholesterol 11/23/2021 30 (A) 40 - 60 mg/dL Final   • LDL Cholesterol  11/23/2021 71  0 - 100 mg/dL Final   • VLDL Cholesterol 11/23/2021 18  5 - 40 mg/dL Final   • LDL/HDL Ratio 11/23/2021 2.33   Final       Assessment/Plan   Diagnoses and all orders for this visit:    1. Schizoid personality disorder in adult (HCC) (Primary)  -     Brexpiprazole (Rexulti) 1 MG tablet; Take 1 mg by mouth Daily.  Dispense: 30 tablet; Refill: 2  -     FLUoxetine (PROzac) 40 MG capsule; Take 2 capsules by mouth Daily.  Dispense: 60 capsule; Refill: 2    2. Generalized anxiety disorder  -     Brexpiprazole (Rexulti) 1 MG tablet; Take 1 mg by mouth Daily.  Dispense: 30 tablet; Refill: 2  -     FLUoxetine (PROzac) 40 MG capsule; Take 2 capsules by mouth Daily.  Dispense: 60 capsule; Refill: 2  -     hydrOXYzine (ATARAX) 10 MG tablet; Take 1 tablet by mouth 2 (Two) Times a Day As Needed for Anxiety.  Dispense: 60 tablet; Refill: 2  -     traZODone (DESYREL) 50 MG tablet; Take 1 tablet by mouth Every Night.  Dispense: 30 tablet; Refill: 2    3. Other insomnia  -     traZODone (DESYREL) 50 MG tablet; Take 1 tablet by mouth Every Night.  Dispense: 30 tablet; Refill: 2    4. Medication management        -Continue brexpiprazole 1 mg daily for AVH and paranoia. Lengthy discussion with patient on the possible side effects of antipsychotic medications including increased cholesterol, increased blood sugar, and possibility of weight gain.  Also discussed the need to monitor lab work associated with this.  The risk of muscle movement disorders with this class of medication was also discussed.  -Restart fluoxetine 40 mg daily for 10 days then increase to 80 mg daily for anxiety and depression  -Continue trazodone 50 mg nightly for sleep  -Continue hydroxyzine 10 mg twice daily as needed for anxiety  -Encouraged patient to continue therapy  -YADIEL reviewed and appropriate.  Patient counseled on use of controlled substances.  -The benefits of a healthy diet and exercise were discussed with patient, especially the positive effects they have on mental health. Patient encouraged to consider lifestyle modification regarding  diet and exercise patterns to maximize results of mental health treatment.  -Reviewed previous available documentation  -Reviewed most recent available labs             Visit Diagnoses:    ICD-10-CM ICD-9-CM   1. Schizoid personality disorder in adult (HCC)  F60.1 301.20   2. Generalized anxiety disorder  F41.1 300.02   3. Other insomnia  G47.09 780.52   4. Medication management  Z79.899 V58.69         TREATMENT PLAN/GOALS: Continue supportive psychotherapy efforts and medications as indicated. Treatment and medication options discussed during today's visit. Patient acknowledged and verbally consented to continue with current treatment plan and was educated on the importance of compliance with treatment and follow-up appointments.    MEDICATION ISSUES:    Discussed medication options and treatment plan of prescribed medication as well as the risks, benefits, and side effects including potential falls, possible impaired driving and metabolic adversities among others. Patient is agreeable to call the office with any worsening of symptoms or onset of side effects. Patient is agreeable to call 911 or go to the nearest ER should he/she begin having SI/HI.     MEDS ORDERED DURING VISIT:  New Medications Ordered This Visit   Medications   • Brexpiprazole (Rexulti) 1 MG tablet     Sig: Take 1 mg by mouth Daily.     Dispense:  30 tablet     Refill:  2   • FLUoxetine (PROzac) 40 MG capsule     Sig: Take 2 capsules by mouth Daily.     Dispense:  60 capsule     Refill:  2   • hydrOXYzine (ATARAX) 10 MG tablet     Sig: Take 1 tablet by mouth 2 (Two) Times a Day As Needed for Anxiety.     Dispense:  60 tablet     Refill:  2   • traZODone (DESYREL) 50 MG tablet     Sig: Take 1 tablet  by mouth Every Night.     Dispense:  30 tablet     Refill:  2       Return in about 3 months (around 7/4/2022), or if symptoms worsen or fail to improve.         Prognosis: Guarded dependent on medication/follow up and treatment plan compliance.  Functionality: pt showing improvements in important areas of daily functioning.     Short-term goals: Patient will adhere to medication regimen and note continued improvement in symptoms over the next 3 months.   Long-term goals: Patient will be adherent to medication management and psychotherapy with continued improvement in symptoms over the next 6 months          This document has been electronically signed by RUPINDER Gann   April 4, 2022 15:18 EDT    Part of this note may be an electronic transcription/translation of spoken language to printed text using the Dragon Dictation System.

## 2022-05-23 ENCOUNTER — OFFICE VISIT (OUTPATIENT)
Dept: FAMILY MEDICINE CLINIC | Facility: CLINIC | Age: 52
End: 2022-05-23

## 2022-05-23 VITALS
HEIGHT: 74 IN | WEIGHT: 267.6 LBS | SYSTOLIC BLOOD PRESSURE: 136 MMHG | HEART RATE: 65 BPM | BODY MASS INDEX: 34.34 KG/M2 | DIASTOLIC BLOOD PRESSURE: 78 MMHG | OXYGEN SATURATION: 100 % | TEMPERATURE: 97.7 F

## 2022-05-23 DIAGNOSIS — Z12.11 COLON CANCER SCREENING: ICD-10-CM

## 2022-05-23 DIAGNOSIS — F41.1 GENERALIZED ANXIETY DISORDER: ICD-10-CM

## 2022-05-23 DIAGNOSIS — R63.5 WEIGHT GAIN DUE TO MEDICATION: ICD-10-CM

## 2022-05-23 DIAGNOSIS — K58.0 IRRITABLE BOWEL SYNDROME WITH DIARRHEA: ICD-10-CM

## 2022-05-23 DIAGNOSIS — J30.2 SEASONAL ALLERGIES: ICD-10-CM

## 2022-05-23 DIAGNOSIS — F60.1 SCHIZOID PERSONALITY DISORDER IN ADULT: ICD-10-CM

## 2022-05-23 DIAGNOSIS — T50.905A WEIGHT GAIN DUE TO MEDICATION: ICD-10-CM

## 2022-05-23 DIAGNOSIS — E11.65 TYPE 2 DIABETES MELLITUS WITH HYPERGLYCEMIA, WITHOUT LONG-TERM CURRENT USE OF INSULIN: Primary | ICD-10-CM

## 2022-05-23 DIAGNOSIS — G47.09 OTHER INSOMNIA: ICD-10-CM

## 2022-05-23 LAB
ALBUMIN SERPL-MCNC: 3.98 G/DL (ref 3.5–5.2)
ALBUMIN/GLOB SERPL: 1.4 G/DL
ALP SERPL-CCNC: 94 U/L (ref 39–117)
ALT SERPL W P-5'-P-CCNC: 33 U/L (ref 1–41)
ANION GAP SERPL CALCULATED.3IONS-SCNC: 7.1 MMOL/L (ref 5–15)
AST SERPL-CCNC: 26 U/L (ref 1–40)
BASOPHILS # BLD AUTO: 0.04 10*3/MM3 (ref 0–0.2)
BASOPHILS NFR BLD AUTO: 0.7 % (ref 0–1.5)
BILIRUB SERPL-MCNC: 0.5 MG/DL (ref 0–1.2)
BUN SERPL-MCNC: 9 MG/DL (ref 6–20)
BUN/CREAT SERPL: 12.2 (ref 7–25)
CALCIUM SPEC-SCNC: 9.3 MG/DL (ref 8.6–10.5)
CHLORIDE SERPL-SCNC: 98 MMOL/L (ref 98–107)
CHOLEST SERPL-MCNC: 143 MG/DL (ref 0–200)
CO2 SERPL-SCNC: 29.9 MMOL/L (ref 22–29)
CREAT SERPL-MCNC: 0.74 MG/DL (ref 0.76–1.27)
DEPRECATED RDW RBC AUTO: 43.8 FL (ref 37–54)
EGFRCR SERPLBLD CKD-EPI 2021: 109 ML/MIN/1.73
EOSINOPHIL # BLD AUTO: 0.14 10*3/MM3 (ref 0–0.4)
EOSINOPHIL NFR BLD AUTO: 2.6 % (ref 0.3–6.2)
ERYTHROCYTE [DISTWIDTH] IN BLOOD BY AUTOMATED COUNT: 13.3 % (ref 12.3–15.4)
GLOBULIN UR ELPH-MCNC: 2.9 GM/DL
GLUCOSE SERPL-MCNC: 145 MG/DL (ref 65–99)
HBA1C MFR BLD: 9.1 % (ref 4.8–5.6)
HCT VFR BLD AUTO: 44.8 % (ref 37.5–51)
HDLC SERPL-MCNC: 29 MG/DL (ref 40–60)
HGB BLD-MCNC: 14.8 G/DL (ref 13–17.7)
IMM GRANULOCYTES # BLD AUTO: 0.02 10*3/MM3 (ref 0–0.05)
IMM GRANULOCYTES NFR BLD AUTO: 0.4 % (ref 0–0.5)
LDLC SERPL CALC-MCNC: 91 MG/DL (ref 0–100)
LDLC/HDLC SERPL: 3.05 {RATIO}
LYMPHOCYTES # BLD AUTO: 1.92 10*3/MM3 (ref 0.7–3.1)
LYMPHOCYTES NFR BLD AUTO: 35.7 % (ref 19.6–45.3)
MCH RBC QN AUTO: 29.8 PG (ref 26.6–33)
MCHC RBC AUTO-ENTMCNC: 33 G/DL (ref 31.5–35.7)
MCV RBC AUTO: 90.3 FL (ref 79–97)
MONOCYTES # BLD AUTO: 0.42 10*3/MM3 (ref 0.1–0.9)
MONOCYTES NFR BLD AUTO: 7.8 % (ref 5–12)
NEUTROPHILS NFR BLD AUTO: 2.84 10*3/MM3 (ref 1.7–7)
NEUTROPHILS NFR BLD AUTO: 52.8 % (ref 42.7–76)
NRBC BLD AUTO-RTO: 0 /100 WBC (ref 0–0.2)
PLATELET # BLD AUTO: 230 10*3/MM3 (ref 140–450)
PMV BLD AUTO: 11.9 FL (ref 6–12)
POTASSIUM SERPL-SCNC: 4.3 MMOL/L (ref 3.5–5.2)
PROT SERPL-MCNC: 6.9 G/DL (ref 6–8.5)
RBC # BLD AUTO: 4.96 10*6/MM3 (ref 4.14–5.8)
SODIUM SERPL-SCNC: 135 MMOL/L (ref 136–145)
TRIGL SERPL-MCNC: 128 MG/DL (ref 0–150)
VLDLC SERPL-MCNC: 23 MG/DL (ref 5–40)
WBC NRBC COR # BLD: 5.38 10*3/MM3 (ref 3.4–10.8)

## 2022-05-23 PROCEDURE — 80053 COMPREHEN METABOLIC PANEL: CPT | Performed by: FAMILY MEDICINE

## 2022-05-23 PROCEDURE — 99214 OFFICE O/P EST MOD 30 MIN: CPT | Performed by: FAMILY MEDICINE

## 2022-05-23 PROCEDURE — 80061 LIPID PANEL: CPT | Performed by: FAMILY MEDICINE

## 2022-05-23 PROCEDURE — 85025 COMPLETE CBC W/AUTO DIFF WBC: CPT | Performed by: FAMILY MEDICINE

## 2022-05-23 PROCEDURE — 83036 HEMOGLOBIN GLYCOSYLATED A1C: CPT | Performed by: FAMILY MEDICINE

## 2022-05-23 PROCEDURE — 36415 COLL VENOUS BLD VENIPUNCTURE: CPT | Performed by: FAMILY MEDICINE

## 2022-05-23 RX ORDER — LORATADINE 10 MG/1
10 TABLET ORAL DAILY
Qty: 90 TABLET | Refills: 0 | Status: SHIPPED | OUTPATIENT
Start: 2022-05-23 | End: 2022-08-23 | Stop reason: SDUPTHER

## 2022-05-23 RX ORDER — ATORVASTATIN CALCIUM 40 MG/1
40 TABLET, FILM COATED ORAL NIGHTLY
Qty: 90 TABLET | Refills: 1 | Status: SHIPPED | OUTPATIENT
Start: 2022-05-23 | End: 2022-08-23 | Stop reason: SDUPTHER

## 2022-05-23 RX ORDER — PIOGLITAZONEHYDROCHLORIDE 45 MG/1
45 TABLET ORAL DAILY
Qty: 90 TABLET | Refills: 0 | Status: SHIPPED | OUTPATIENT
Start: 2022-05-23 | End: 2022-08-23 | Stop reason: SDUPTHER

## 2022-05-23 RX ORDER — GLIPIZIDE AND METFORMIN HCL 5; 500 MG/1; MG/1
1 TABLET, FILM COATED ORAL
Qty: 180 TABLET | Refills: 0 | Status: SHIPPED | OUTPATIENT
Start: 2022-05-23 | End: 2022-08-23 | Stop reason: SDUPTHER

## 2022-05-23 RX ORDER — LISINOPRIL 5 MG/1
5 TABLET ORAL DAILY
Qty: 90 TABLET | Refills: 1 | Status: SHIPPED | OUTPATIENT
Start: 2022-05-23 | End: 2022-08-23 | Stop reason: SDUPTHER

## 2022-05-23 NOTE — PROGRESS NOTES
Subjective   Jamarcus Allison is a 52 y.o. male.   Pt presents today with CC of Diabetes      History of Present Illness   1.  Patient is a 52-year-old male here to follow-up on diabetes.  He has had recent weight loss as well, suspected to be due to medications, though he also does not get as much exercise as he would like.  He takes lisinopril, Actos, Lipitor, medical hip 5-500 twice a day.  Higher doses of metformin were not tolerated because of irritable bowel with diarrhea.  In the past, we considered starting Jardiance if his A1c went greater than 8.    #2 he has anxiety with schizoid personality disorder.  He follows with psychiatry.  #3 he is due for colonoscopy.  His last was greater than 5 years ago.  He reports he had polyps and he was just follow-up in 5 years.  Previously done by Dr. Jade.  He requests a different provider this time.  #4 he has seasonal allergies for which he takes Claritin 10 mg daily.           The following portions of the patient's history were reviewed and updated as appropriate: allergies, current medications, past family history, past medical history, past social history, past surgical history and problem list.    Review of Systems   Constitutional: Negative for chills, fever and unexpected weight loss.   HENT: Negative for congestion and sore throat.    Eyes: Negative for blurred vision and visual disturbance.   Respiratory: Negative for cough and wheezing.    Cardiovascular: Negative for chest pain and palpitations.   Gastrointestinal: Negative for abdominal pain and diarrhea.   Endocrine: Negative for cold intolerance and heat intolerance.   Genitourinary: Negative for dysuria.   Musculoskeletal: Negative for arthralgias and neck stiffness.   Neurological: Negative for dizziness, seizures and syncope.   Psychiatric/Behavioral: Negative for self-injury, suicidal ideas and depressed mood.       Objective   Physical Exam  Vitals and nursing note reviewed.   Constitutional:        Appearance: He is well-developed.   HENT:      Head: Normocephalic and atraumatic.      Right Ear: External ear normal.      Left Ear: External ear normal.      Nose: Nose normal.   Eyes:      Conjunctiva/sclera: Conjunctivae normal.      Pupils: Pupils are equal, round, and reactive to light.   Cardiovascular:      Rate and Rhythm: Normal rate and regular rhythm.      Heart sounds: Normal heart sounds.   Pulmonary:      Effort: Pulmonary effort is normal.      Breath sounds: Normal breath sounds.   Abdominal:      General: Bowel sounds are normal.      Palpations: Abdomen is soft.   Musculoskeletal:      Cervical back: Normal range of motion and neck supple.   Skin:     General: Skin is warm and dry.   Neurological:      Mental Status: He is alert and oriented to person, place, and time.   Psychiatric:         Behavior: Behavior normal.           Assessment & Plan   Diagnoses and all orders for this visit:    1. Type 2 diabetes mellitus with hyperglycemia, without long-term current use of insulin (HCC) (Primary)  -     Comprehensive Metabolic Panel; Future  -     CBC Auto Differential; Future  -     Hemoglobin A1c; Future  -     Lipid Panel; Future  -     lisinopril (PRINIVIL,ZESTRIL) 5 MG tablet; Take 1 tablet by mouth Daily.  Dispense: 90 tablet; Refill: 1  -     pioglitazone (ACTOS) 45 MG tablet; Take 1 tablet by mouth Daily.  Dispense: 90 tablet; Refill: 0  -     atorvastatin (LIPITOR) 40 MG tablet; Take 1 tablet by mouth Every Night.  Dispense: 90 tablet; Refill: 1  -     glipiZIDE-metFORMIN (METAGLIP) 5-500 MG per tablet; Take 1 tablet by mouth 2 (Two) Times a Day Before Meals.  Dispense: 180 tablet; Refill: 0  Will consider starting Jardiance or similar medication if hemoglobin A1c is greater than 8.  He suggest that he is going to be up to get more exercise going forward and intends on trying to lose weight.  2. Irritable bowel syndrome with diarrhea  -     Comprehensive Metabolic Panel; Future    3.  Generalized anxiety disorder    4. Schizoid personality disorder in adult (HCC)    5. Other insomnia    6. Seasonal allergies  -     loratadine (CLARITIN) 10 MG tablet; Take 1 tablet by mouth Daily.  Dispense: 90 tablet; Refill: 0    7. Colon cancer screening  -     Ambulatory Referral For Screening Colonoscopy    8. Weight gain due to medication  He will have to work harder to lose weight, and he needs to be more cognizant of side effects of the psychiatric medications.  He is currently doing well with his psychiatric medications, though they come with risk of weight gain.  Because of his diabetes, he needs to do his best not to gain weight.  He is agreeable.  Diabetic diet and exercise recommended.             Jamarcus Allison  reports that he has never smoked. He has never used smokeless tobacco.. I have educated him on the risk of diseases from using tobacco products such as cancer, COPD and heart disease.     I advised him to quit and he is not willing to quit.    I spent 3  minutes counseling the patient.         BMI is >= 30 and <= 34.9 (Class 1 obesity). The following options were offered after discussion: exercise counseling/recommendations

## 2022-05-25 ENCOUNTER — TELEPHONE (OUTPATIENT)
Dept: FAMILY MEDICINE CLINIC | Facility: CLINIC | Age: 52
End: 2022-05-25

## 2022-05-25 DIAGNOSIS — E11.65 TYPE 2 DIABETES MELLITUS WITH HYPERGLYCEMIA, WITHOUT LONG-TERM CURRENT USE OF INSULIN: Primary | ICD-10-CM

## 2022-05-25 NOTE — TELEPHONE ENCOUNTER
----- Message from Everardo Venegas DO sent at 5/25/2022  3:33 PM EDT -----  Hemoglobin A1c went up to 9.1.  Recommend starting Jardiance 10 mg daily to go along with your other medications.  Prescription was sent.  Please remember to drink plenty water.  I recommend taking this medication in the morning as it may increase urinary frequency, particularly in the first week or so.  If you have any problems with this medication, please follow-up sooner than scheduled.      Notified & verbalized understanding.

## 2022-06-02 DIAGNOSIS — E11.65 TYPE 2 DIABETES MELLITUS WITH HYPERGLYCEMIA, WITHOUT LONG-TERM CURRENT USE OF INSULIN: ICD-10-CM

## 2022-06-02 RX ORDER — PIOGLITAZONEHYDROCHLORIDE 15 MG/1
15 TABLET ORAL DAILY
Qty: 90 TABLET | Refills: 1 | OUTPATIENT
Start: 2022-06-02

## 2022-06-13 ENCOUNTER — OFFICE VISIT (OUTPATIENT)
Dept: SURGERY | Facility: CLINIC | Age: 52
End: 2022-06-13

## 2022-06-13 ENCOUNTER — TELEPHONE (OUTPATIENT)
Dept: SURGERY | Facility: CLINIC | Age: 52
End: 2022-06-13

## 2022-06-13 ENCOUNTER — PATIENT ROUNDING (BHMG ONLY) (OUTPATIENT)
Dept: SURGERY | Facility: CLINIC | Age: 52
End: 2022-06-13

## 2022-06-13 VITALS
DIASTOLIC BLOOD PRESSURE: 76 MMHG | OXYGEN SATURATION: 100 % | SYSTOLIC BLOOD PRESSURE: 130 MMHG | HEART RATE: 68 BPM | WEIGHT: 261 LBS | BODY MASS INDEX: 33.5 KG/M2 | HEIGHT: 74 IN

## 2022-06-13 DIAGNOSIS — Z86.010 HISTORY OF COLONIC POLYPS: ICD-10-CM

## 2022-06-13 DIAGNOSIS — Z12.11 SCREENING FOR COLON CANCER: Primary | ICD-10-CM

## 2022-06-13 DIAGNOSIS — K62.5 RECTAL BLEEDING: ICD-10-CM

## 2022-06-13 PROBLEM — Z86.0100 HISTORY OF COLONIC POLYPS: Status: ACTIVE | Noted: 2022-06-13

## 2022-06-13 PROCEDURE — 99214 OFFICE O/P EST MOD 30 MIN: CPT

## 2022-06-13 RX ORDER — SODIUM, POTASSIUM,MAG SULFATES 17.5-3.13G
2 SOLUTION, RECONSTITUTED, ORAL ORAL TAKE AS DIRECTED
Qty: 354 ML | Refills: 0 | Status: SHIPPED | OUTPATIENT
Start: 2022-06-13 | End: 2022-08-23

## 2022-06-13 NOTE — PROGRESS NOTES
"Subjective   Jamarcus Allison is a 52 y.o. male who presents today for Initial Evaluation    Chief Complaint:    Chief Complaint   Patient presents with   • Colonoscopy     CONSULT COLONOSCOPY         History of Present Illness:    History of Present Illness Jamarcus is a 52-year-old male who presents for screening colonoscopy.  He states that he has had 1 colonoscopy in the past.  Reports that it was 5+ years ago.  He states at that time he had 3-4 polyps removed and that they were precancerous.  He states he has a past history of irritable bowel syndrome and consistently has abdominal pain and discomfort.  He reports that he has seen blood in his stool.  States that it is \"red\" in color.  He states that he sees us approximately every other week.  He does have a history of hemorrhoids.  He does not take any blood thinning medications.  States that he does have constipation at times however, it is not often.  He states that he has a positive family history of colon cancer on his father side.  Reports that he has bowel movements twice daily, sometimes more.    The following portions of the patient's history were reviewed and updated as appropriate: allergies, current medications, past family history, past medical history, past social history, past surgical history and problem list.    Past Medical History:  Past Medical History:   Diagnosis Date   • Colon polyp    • Depression    • Diabetes mellitus (HCC)    • Diabetic neuropathy (HCC)    • IBS (irritable bowel syndrome)    • Peripheral artery disease (HCC)    • PTSD (post-traumatic stress disorder)    • Rectal bleeding    • Schizoid personality (HCC)        Social History:  Social History     Socioeconomic History   • Marital status: Single   Tobacco Use   • Smoking status: Never Smoker   • Smokeless tobacco: Never Used   Vaping Use   • Vaping Use: Never used   Substance and Sexual Activity   • Alcohol use: Never   • Drug use: Never   • Sexual activity: Defer "       Family History:  Family History   Problem Relation Age of Onset   • Heart disease Mother    • Diabetes Mother    • Cancer Father    • Alcohol abuse Father    • Heart disease Sister    • Diabetes Sister    • Cancer Sister    • Thyroid disease Sister    • Depression Sister    • Anxiety disorder Sister    • Depression Brother    • Anxiety disorder Brother    • Alcohol abuse Brother        Past Surgical History:  Past Surgical History:   Procedure Laterality Date   • CATARACT EXTRACTION     • EYE SURGERY     • POLYPECTOMY         Problem List:  Patient Active Problem List   Diagnosis   • Type 2 diabetes mellitus with hyperglycemia, without long-term current use of insulin ([facility]ly [facility])       Allergy:   Allergies   Allergen Reactions   • Poison Ivy Extract Anaphylaxis   • Poison Oak Extract Anaphylaxis   • Sumac Anaphylaxis        Current Medications:   Current Outpatient Medications   Medication Sig Dispense Refill   • atorvastatin (LIPITOR) 40 MG tablet Take 1 tablet by mouth Every Night. 90 tablet 1   • Blood Glucose Monitoring Suppl (B-D LOGIC BLOOD GLUCOSE) w/Device kit 1 Device Daily. 1 each 0   • Brexpiprazole (Rexulti) 1 MG tablet Take 1 mg by mouth Daily. 30 tablet 2   • empagliflozin (Jardiance) 10 MG tablet tablet Take 1 tablet by mouth Daily. 90 tablet 0   • FLUoxetine (PROzac) 40 MG capsule Take 2 capsules by mouth Daily. 60 capsule 2   • glipiZIDE-metFORMIN (METAGLIP) 5-500 MG per tablet Take 1 tablet by mouth 2 (Two) Times a Day Before Meals. 180 tablet 0   • glucose blood test strip Check daily 200 each 12   • hydrOXYzine (ATARAX) 10 MG tablet Take 1 tablet by mouth 2 (Two) Times a Day As Needed for Anxiety. 60 tablet 2   • Lancets (freestyle) lancets Check daily fasting 200 each 12   • lisinopril (PRINIVIL,ZESTRIL) 5 MG tablet Take 1 tablet by mouth Daily. 90 tablet 1   • loratadine (CLARITIN) 10 MG tablet Take 1 tablet by mouth Daily. 90 tablet 0   • pioglitazone (ACTOS) 45 MG tablet Take 1 tablet by  mouth Daily. 90 tablet 0   • traZODone (DESYREL) 50 MG tablet Take 1 tablet by mouth Every Night. 30 tablet 2   • sodium-potassium-magnesium sulfates (Suprep Bowel Prep Kit) 17.5-3.13-1.6 GM/177ML solution oral solution Take 2 bottles by mouth Take As Directed for 2 doses. 354 mL 0     No current facility-administered medications for this visit.       Review of Systems:    Review of Systems   Constitutional: Negative for activity change.   HENT: Negative for congestion.    Eyes: Negative for blurred vision.   Respiratory: Negative for shortness of breath.    Cardiovascular: Negative for chest pain.   Gastrointestinal: Positive for abdominal pain and blood in stool.   Endocrine: Negative for cold intolerance.   Genitourinary: Negative for flank pain.   Musculoskeletal: Negative for arthralgias.   Skin: Negative for bruise.   Allergic/Immunologic: Negative for environmental allergies.   Neurological: Negative for confusion.   Hematological: Negative for adenopathy.   Psychiatric/Behavioral: Negative for agitation.         Physical Exam:   Physical Exam  Constitutional:       Appearance: Normal appearance. He is obese.   HENT:      Head: Normocephalic and atraumatic.      Right Ear: External ear normal.      Left Ear: External ear normal.      Nose: Nose normal.      Mouth/Throat:      Pharynx: Oropharynx is clear.   Eyes:      Extraocular Movements: Extraocular movements intact.      Pupils: Pupils are equal, round, and reactive to light.   Cardiovascular:      Rate and Rhythm: Normal rate and regular rhythm.      Pulses: Normal pulses.      Heart sounds: Normal heart sounds.   Pulmonary:      Effort: Pulmonary effort is normal.      Breath sounds: Normal breath sounds.   Abdominal:      General: Abdomen is flat. Bowel sounds are normal.      Palpations: Abdomen is soft.      Comments: Disomfort upon palpation, denies pain   Musculoskeletal:         General: Normal range of motion.      Cervical back: Normal range of  "motion and neck supple.   Skin:     General: Skin is warm and dry.      Capillary Refill: Capillary refill takes less than 2 seconds.   Neurological:      General: No focal deficit present.      Mental Status: He is alert and oriented to person, place, and time.   Psychiatric:         Mood and Affect: Mood normal.         Behavior: Behavior normal.         Vitals:  Blood pressure 130/76, pulse 68, height 188 cm (74\"), weight 118 kg (261 lb), SpO2 100 %.   Body mass index is 33.51 kg/m².        Assessment & Plan   Diagnoses and all orders for this visit:    1. Screening for colon cancer (Primary)  -     Case Request; Standing  -     COVID PRE-OP / PRE-PROCEDURE SCREENING ORDER (NO ISOLATION) - Swab, Nasopharynx; Future  -     Case Request    2. History of colonic polyps  -     Case Request; Standing  -     COVID PRE-OP / PRE-PROCEDURE SCREENING ORDER (NO ISOLATION) - Swab, Nasopharynx; Future  -     Case Request    3. Rectal bleeding  -     Case Request; Standing  -     COVID PRE-OP / PRE-PROCEDURE SCREENING ORDER (NO ISOLATION) - Swab, Nasopharynx; Future  -     Case Request    Other orders  -     sodium-potassium-magnesium sulfates (Suprep Bowel Prep Kit) 17.5-3.13-1.6 GM/177ML solution oral solution; Take 2 bottles by mouth Take As Directed for 2 doses.  Dispense: 354 mL; Refill: 0  -     Follow anesthesia standing orders.  -     Provide NPO Instructions to Patient; Future  -     Chlorhexidine Skin Prep; Future    Patient will undergo a colonoscopy with Dr. Mortensen.  He verbalized understanding of prep instructions and wishes to proceed.  I offered patient medication for hemorrhoids.  However, he declined due to being unable to afford medication.    Visit Diagnoses:    ICD-10-CM ICD-9-CM   1. Screening for colon cancer  Z12.11 V76.51   2. History of colonic polyps  Z86.010 V12.72   3. Rectal bleeding  K62.5 569.3         MEDS ORDERED DURING VISIT:  New Medications Ordered This Visit   Medications   • " sodium-potassium-magnesium sulfates (Suprep Bowel Prep Kit) 17.5-3.13-1.6 GM/177ML solution oral solution     Sig: Take 2 bottles by mouth Take As Directed for 2 doses.     Dispense:  354 mL     Refill:  0       No follow-ups on file.             This document has been electronically signed by RUPINDER Izquierdo  June 13, 2022 10:43 EDT    Please note that portions of this note were completed with a voice recognition program.

## 2022-06-13 NOTE — PROGRESS NOTES
June 13, 2022    Hello, may I speak with Jamarcus Allison?    My name is Carolann      I am  With Eric Puryd, [facility] office.     Before we get started may I verify your date of birth? 1970    I am calling to officially welcome you to our practice and ask about your recent visit. Is this a good time to talk? yes    Tell me about your visit with us. What things went well?  Everything went well        We're always looking for ways to make our patients' experiences even better. Do you have recommendations on ways we may improve?  no, everything was good     Overall were you satisfied with your first visit to our practice? yes       I appreciate you taking the time to speak with me today. Is there anything else I can do for you? no      Thank you, and have a great day.    DL 6/13/22 2:47pm

## 2022-06-13 NOTE — TELEPHONE ENCOUNTER
Jamarcus is aware that his colonoscopy has been scheduled with  on July 13, 2022. ERROL Ansari for  will call him the week before to go over his instructions.  He voiced understanding.       DL 6/13/22 2:44pm

## 2022-06-27 ENCOUNTER — OFFICE VISIT (OUTPATIENT)
Dept: PSYCHIATRY | Facility: CLINIC | Age: 52
End: 2022-06-27

## 2022-06-27 VITALS
SYSTOLIC BLOOD PRESSURE: 126 MMHG | HEIGHT: 74 IN | DIASTOLIC BLOOD PRESSURE: 78 MMHG | HEART RATE: 82 BPM | BODY MASS INDEX: 33.11 KG/M2 | WEIGHT: 258 LBS

## 2022-06-27 DIAGNOSIS — F60.1 SCHIZOID PERSONALITY DISORDER IN ADULT: Primary | ICD-10-CM

## 2022-06-27 DIAGNOSIS — Z79.899 MEDICATION MANAGEMENT: ICD-10-CM

## 2022-06-27 DIAGNOSIS — F43.10 PTSD (POST-TRAUMATIC STRESS DISORDER): ICD-10-CM

## 2022-06-27 DIAGNOSIS — G47.09 OTHER INSOMNIA: ICD-10-CM

## 2022-06-27 DIAGNOSIS — F41.1 GENERALIZED ANXIETY DISORDER: ICD-10-CM

## 2022-06-27 DIAGNOSIS — F39 MOOD DISORDER: ICD-10-CM

## 2022-06-27 PROCEDURE — 99214 OFFICE O/P EST MOD 30 MIN: CPT | Performed by: NURSE PRACTITIONER

## 2022-06-27 RX ORDER — HYDROXYZINE HYDROCHLORIDE 10 MG/1
10 TABLET, FILM COATED ORAL 2 TIMES DAILY PRN
Qty: 60 TABLET | Refills: 2 | Status: SHIPPED | OUTPATIENT
Start: 2022-06-27 | End: 2022-07-25 | Stop reason: SDUPTHER

## 2022-06-27 RX ORDER — TRAZODONE HYDROCHLORIDE 100 MG/1
100 TABLET ORAL NIGHTLY
Qty: 30 TABLET | Refills: 2 | Status: SHIPPED | OUTPATIENT
Start: 2022-06-27 | End: 2022-07-25 | Stop reason: SDUPTHER

## 2022-06-27 RX ORDER — FLUOXETINE HYDROCHLORIDE 40 MG/1
80 CAPSULE ORAL DAILY
Qty: 60 CAPSULE | Refills: 2 | Status: SHIPPED | OUTPATIENT
Start: 2022-06-27 | End: 2022-07-25 | Stop reason: SDUPTHER

## 2022-06-27 RX ORDER — BREXPIPRAZOLE 1 MG/1
1 TABLET ORAL DAILY
Qty: 30 TABLET | Refills: 2 | Status: SHIPPED | OUTPATIENT
Start: 2022-06-27 | End: 2022-07-25

## 2022-06-27 NOTE — PROGRESS NOTES
Subjective   Jamarcus Allison is a 52 y.o. male who presents today for follow up    Chief Complaint:  Paranoia    History of Present Illness: Patient presents as follow up. He reports voices have returned, states he has not taken Rexulti in 2 weeks and believes this could be the cause. He reports sleep is poor with difficulty falling asleep. States his neighbors continue to be loud. Also reports AH and paranoia worsens at night. States he felt medication controlled AH in the past. He reports appetite is good. He denies any self harm. He denies SI/HI.    The following portions of the patient's history were reviewed and updated as appropriate: allergies, current medications, past family history, past medical history, past social history, past surgical history and problem list.      Past Medical History:  Past Medical History:   Diagnosis Date   • Colon polyp    • Depression    • Diabetes mellitus (HCC)    • Diabetic neuropathy (HCC)    • IBS (irritable bowel syndrome)    • Peripheral artery disease (HCC)    • PTSD (post-traumatic stress disorder)    • Rectal bleeding    • Schizoid personality (HCC)        Social History:  Social History     Socioeconomic History   • Marital status: Single   Tobacco Use   • Smoking status: Never Smoker   • Smokeless tobacco: Never Used   Vaping Use   • Vaping Use: Never used   Substance and Sexual Activity   • Alcohol use: Never   • Drug use: Never   • Sexual activity: Defer       Family History:  Family History   Problem Relation Age of Onset   • Heart disease Mother    • Diabetes Mother    • Cancer Father    • Alcohol abuse Father    • Heart disease Sister    • Diabetes Sister    • Cancer Sister    • Thyroid disease Sister    • Depression Sister    • Anxiety disorder Sister    • Depression Brother    • Anxiety disorder Brother    • Alcohol abuse Brother        Past Surgical History:  Past Surgical History:   Procedure Laterality Date   • CATARACT EXTRACTION     • EYE SURGERY     •  POLYPECTOMY         Problem List:  Patient Active Problem List   Diagnosis   • Type 2 diabetes mellitus with hyperglycemia, without long-term current use of insulin (HCC)   • Screening for colon cancer   • History of colonic polyps   • Rectal bleeding       Allergy:   Allergies   Allergen Reactions   • Poison Ivy Extract Anaphylaxis   • Poison Oak Extract Anaphylaxis   • Sumac Anaphylaxis        Current Medications:   Current Outpatient Medications   Medication Sig Dispense Refill   • atorvastatin (LIPITOR) 40 MG tablet Take 1 tablet by mouth Every Night. 90 tablet 1   • Blood Glucose Monitoring Suppl (B-D LOGIC BLOOD GLUCOSE) w/Device kit 1 Device Daily. 1 each 0   • Brexpiprazole (Rexulti) 1 MG tablet Take 1 mg by mouth Daily. 30 tablet 2   • empagliflozin (Jardiance) 10 MG tablet tablet Take 1 tablet by mouth Daily. 90 tablet 0   • FLUoxetine (PROzac) 40 MG capsule Take 2 capsules by mouth Daily. 60 capsule 2   • glipiZIDE-metFORMIN (METAGLIP) 5-500 MG per tablet Take 1 tablet by mouth 2 (Two) Times a Day Before Meals. 180 tablet 0   • glucose blood test strip Check daily 200 each 12   • hydrOXYzine (ATARAX) 10 MG tablet Take 1 tablet by mouth 2 (Two) Times a Day As Needed for Anxiety. 60 tablet 2   • Lancets (freestyle) lancets Check daily fasting 200 each 12   • lisinopril (PRINIVIL,ZESTRIL) 5 MG tablet Take 1 tablet by mouth Daily. 90 tablet 1   • loratadine (CLARITIN) 10 MG tablet Take 1 tablet by mouth Daily. 90 tablet 0   • pioglitazone (ACTOS) 45 MG tablet Take 1 tablet by mouth Daily. 90 tablet 0   • sodium-potassium-magnesium sulfates (Suprep Bowel Prep Kit) 17.5-3.13-1.6 GM/177ML solution oral solution Take 2 bottles by mouth Take As Directed for 2 doses. 354 mL 0   • traZODone (DESYREL) 100 MG tablet Take 1 tablet by mouth Every Night. 30 tablet 2     No current facility-administered medications for this visit.       Review of Symptoms:    Review of Systems   Constitutional: Positive for fatigue.  "  HENT: Negative.    Eyes: Negative.    Respiratory: Negative.    Cardiovascular: Negative.    Gastrointestinal: Negative.    Genitourinary: Negative.    Musculoskeletal: Negative.    Skin: Negative.    Neurological: Negative.    Psychiatric/Behavioral: Positive for hallucinations, sleep disturbance and depressed mood. Negative for suicidal ideas. The patient is nervous/anxious.        Objective   Physical Exam:   Blood pressure 126/78, pulse 82, height 188 cm (74\"), weight 117 kg (258 lb).  Body mass index is 33.13 kg/m².    Appearance: Well nourished male, appropriately dressed, appears stated age and in no acute distress  Gait, Station, Strength: WNL    Mental Status Exam:   Hygiene:   good  Cooperation:  Cooperative  Eye Contact:  Good  Psychomotor Behavior:  Appropriate  Affect:  Appropriate  Mood: normal  Hopelessness: Denies  Speech:  Minimal  Thought Process:  Goal directed and Linear  Thought Content:  Normal and Mood congruent  Suicidal:  None  Homicidal:  None  Hallucinations:  Auditory  Delusion:  Paranoid  Memory:  Intact  Orientation:  Person, Place, Time and Situation  Reliability:  good  Insight:  Good  Judgement:  Good  Impulse Control:  Good  Physical/Medical Issues:  No      PHQ-Score Total:  PHQ-9 Total Score: 1           Lab Results:   No visits with results within 1 Month(s) from this visit.   Latest known visit with results is:   Office Visit on 05/23/2022   Component Date Value Ref Range Status   • Glucose 05/23/2022 145 (A) 65 - 99 mg/dL Final   • BUN 05/23/2022 9  6 - 20 mg/dL Final   • Creatinine 05/23/2022 0.74 (A) 0.76 - 1.27 mg/dL Final   • Sodium 05/23/2022 135 (A) 136 - 145 mmol/L Final   • Potassium 05/23/2022 4.3  3.5 - 5.2 mmol/L Final   • Chloride 05/23/2022 98  98 - 107 mmol/L Final   • CO2 05/23/2022 29.9 (A) 22.0 - 29.0 mmol/L Final   • Calcium 05/23/2022 9.3  8.6 - 10.5 mg/dL Final   • Total Protein 05/23/2022 6.9  6.0 - 8.5 g/dL Final   • Albumin 05/23/2022 3.98  3.50 - 5.20 " g/dL Final   • ALT (SGPT) 05/23/2022 33  1 - 41 U/L Final   • AST (SGOT) 05/23/2022 26  1 - 40 U/L Final   • Alkaline Phosphatase 05/23/2022 94  39 - 117 U/L Final   • Total Bilirubin 05/23/2022 0.5  0.0 - 1.2 mg/dL Final   • Globulin 05/23/2022 2.9  gm/dL Final   • A/G Ratio 05/23/2022 1.4  g/dL Final   • BUN/Creatinine Ratio 05/23/2022 12.2  7.0 - 25.0 Final   • Anion Gap 05/23/2022 7.1  5.0 - 15.0 mmol/L Final   • eGFR 05/23/2022 109.0  >60.0 mL/min/1.73 Final    National Kidney Foundation and American Society of Nephrology (ASN) Task Force recommended calculation based on the Chronic Kidney Disease Epidemiology Collaboration (CKD-EPI) equation refit without adjustment for race.   • WBC 05/23/2022 5.38  3.40 - 10.80 10*3/mm3 Final   • RBC 05/23/2022 4.96  4.14 - 5.80 10*6/mm3 Final   • Hemoglobin 05/23/2022 14.8  13.0 - 17.7 g/dL Final   • Hematocrit 05/23/2022 44.8  37.5 - 51.0 % Final   • MCV 05/23/2022 90.3  79.0 - 97.0 fL Final   • MCH 05/23/2022 29.8  26.6 - 33.0 pg Final   • MCHC 05/23/2022 33.0  31.5 - 35.7 g/dL Final   • RDW 05/23/2022 13.3  12.3 - 15.4 % Final   • RDW-SD 05/23/2022 43.8  37.0 - 54.0 fl Final   • MPV 05/23/2022 11.9  6.0 - 12.0 fL Final   • Platelets 05/23/2022 230  140 - 450 10*3/mm3 Final   • Neutrophil % 05/23/2022 52.8  42.7 - 76.0 % Final   • Lymphocyte % 05/23/2022 35.7  19.6 - 45.3 % Final   • Monocyte % 05/23/2022 7.8  5.0 - 12.0 % Final   • Eosinophil % 05/23/2022 2.6  0.3 - 6.2 % Final   • Basophil % 05/23/2022 0.7  0.0 - 1.5 % Final   • Immature Grans % 05/23/2022 0.4  0.0 - 0.5 % Final   • Neutrophils, Absolute 05/23/2022 2.84  1.70 - 7.00 10*3/mm3 Final   • Lymphocytes, Absolute 05/23/2022 1.92  0.70 - 3.10 10*3/mm3 Final   • Monocytes, Absolute 05/23/2022 0.42  0.10 - 0.90 10*3/mm3 Final   • Eosinophils, Absolute 05/23/2022 0.14  0.00 - 0.40 10*3/mm3 Final   • Basophils, Absolute 05/23/2022 0.04  0.00 - 0.20 10*3/mm3 Final   • Immature Grans, Absolute 05/23/2022 0.02  0.00 -  0.05 10*3/mm3 Final   • nRBC 05/23/2022 0.0  0.0 - 0.2 /100 WBC Final   • Hemoglobin A1C 05/23/2022 9.10 (A) 4.80 - 5.60 % Final   • Total Cholesterol 05/23/2022 143  0 - 200 mg/dL Final   • Triglycerides 05/23/2022 128  0 - 150 mg/dL Final   • HDL Cholesterol 05/23/2022 29 (A) 40 - 60 mg/dL Final   • LDL Cholesterol  05/23/2022 91  0 - 100 mg/dL Final   • VLDL Cholesterol 05/23/2022 23  5 - 40 mg/dL Final   • LDL/HDL Ratio 05/23/2022 3.05   Final       Assessment & Plan   Diagnoses and all orders for this visit:    1. Schizoid personality disorder in adult (HCC) (Primary)  -     FLUoxetine (PROzac) 40 MG capsule; Take 2 capsules by mouth Daily.  Dispense: 60 capsule; Refill: 2  -     Brexpiprazole (Rexulti) 1 MG tablet; Take 1 mg by mouth Daily.  Dispense: 30 tablet; Refill: 2    2. Generalized anxiety disorder  -     traZODone (DESYREL) 100 MG tablet; Take 1 tablet by mouth Every Night.  Dispense: 30 tablet; Refill: 2  -     hydrOXYzine (ATARAX) 10 MG tablet; Take 1 tablet by mouth 2 (Two) Times a Day As Needed for Anxiety.  Dispense: 60 tablet; Refill: 2  -     FLUoxetine (PROzac) 40 MG capsule; Take 2 capsules by mouth Daily.  Dispense: 60 capsule; Refill: 2  -     Brexpiprazole (Rexulti) 1 MG tablet; Take 1 mg by mouth Daily.  Dispense: 30 tablet; Refill: 2    3. Other insomnia  -     traZODone (DESYREL) 100 MG tablet; Take 1 tablet by mouth Every Night.  Dispense: 30 tablet; Refill: 2    4. Medication management    5. Mood disorder (HCC)    6. PTSD (post-traumatic stress disorder)        -Restart brexpiprazole 1 mg daily for AVH and paranoia. Lengthy discussion with patient on the possible side effects of antipsychotic medications including increased cholesterol, increased blood sugar, and possibility of weight gain.  Also discussed the need to monitor lab work associated with this. The risk of muscle movement disorders with this class of medication was also discussed.  -Continue fluoxetine 80 mg daily for  anxiety and depression  -Increase trazodone 100 mg nightly for sleep  -Continue hydroxyzine 10 mg twice daily as needed for anxiety  -Encouraged patient to continue therapy  -YADIEL reviewed and appropriate. Patient counseled on use of controlled substances.  -The benefits of a healthy diet and exercise were discussed with patient, especially the positive effects they have on mental health. Patient encouraged to consider lifestyle modification regarding  diet and exercise patterns to maximize results of mental health treatment.  -Reviewed previous available documentation  -Reviewed most recent available labs                  Visit Diagnoses:    ICD-10-CM ICD-9-CM   1. Schizoid personality disorder in adult ([facility])  F60.1 301.20   2. Generalized anxiety disorder  F41.1 300.02   3. Other insomnia  G47.09 780.52   4. Medication management  Z79.899 V58.69   5. Mood disorder ([facility])  F39 296.90   6. PTSD (post-traumatic stress disorder)  F43.10 309.81         TREATMENT PLAN/GOALS: Continue supportive psychotherapy efforts and medications as indicated. Treatment and medication options discussed during today's visit. Patient acknowledged and verbally consented to continue with current treatment plan and was educated on the importance of compliance with treatment and follow-up appointments.    MEDICATION ISSUES:    Discussed medication options and treatment plan of prescribed medication as well as the risks, benefits, and side effects including potential falls, possible impaired driving and metabolic adversities among others. Patient is agreeable to call the office with any worsening of symptoms or onset of side effects. Patient is agreeable to call 911 or go to the nearest ER should he/she begin having SI/HI.     MEDS ORDERED DURING VISIT:  New Medications Ordered This Visit   Medications   • traZODone (DESYREL) 100 MG tablet     Sig: Take 1 tablet by mouth Every Night.     Dispense:  30 tablet     Refill:  2   • hydrOXYzine  (ATARAX) 10 MG tablet     Sig: Take 1 tablet by mouth 2 (Two) Times a Day As Needed for Anxiety.     Dispense:  60 tablet     Refill:  2   • FLUoxetine (PROzac) 40 MG capsule     Sig: Take 2 capsules by mouth Daily.     Dispense:  60 capsule     Refill:  2   • Brexpiprazole (Rexulti) 1 MG tablet     Sig: Take 1 mg by mouth Daily.     Dispense:  30 tablet     Refill:  2       Return in about 4 weeks (around 7/25/2022), or if symptoms worsen or fail to improve.         Prognosis: Guarded dependent on medication/follow up and treatment plan compliance.  Functionality: pt showing improvements in important areas of daily functioning.     Short-term goals: Patient will adhere to medication regimen and note continued improvement in symptoms over the next 3 months.   Long-term goals: Patient will be adherent to medication management and psychotherapy with continued improvement in symptoms over the next 6 months          This document has been electronically signed by RUPINDER Gann   June 27, 2022 12:45 EDT    Part of this note may be an electronic transcription/translation of spoken language to printed text using the Dragon Dictation System.

## 2022-07-08 ENCOUNTER — TELEPHONE (OUTPATIENT)
Dept: SURGERY | Facility: CLINIC | Age: 52
End: 2022-07-08

## 2022-07-08 NOTE — TELEPHONE ENCOUNTER
Talked with his sister Kaycee, gave her information time, bowel prep and covid test, Kaycee wrote down all the info for her brother, advised if he had any questions to call me.

## 2022-07-12 ENCOUNTER — LAB (OUTPATIENT)
Dept: LAB | Facility: HOSPITAL | Age: 52
End: 2022-07-12

## 2022-07-12 DIAGNOSIS — Z12.11 SCREENING FOR COLON CANCER: ICD-10-CM

## 2022-07-12 DIAGNOSIS — Z01.818 PREOPERATIVE CLEARANCE: Primary | ICD-10-CM

## 2022-07-12 DIAGNOSIS — K62.5 RECTAL BLEEDING: ICD-10-CM

## 2022-07-12 DIAGNOSIS — Z86.010 HISTORY OF COLONIC POLYPS: ICD-10-CM

## 2022-07-12 LAB
FLUAV RNA RESP QL NAA+PROBE: NOT DETECTED
FLUBV RNA RESP QL NAA+PROBE: NOT DETECTED
SARS-COV-2 RNA RESP QL NAA+PROBE: NOT DETECTED

## 2022-07-12 PROCEDURE — 87636 SARSCOV2 & INF A&B AMP PRB: CPT

## 2022-07-13 ENCOUNTER — ANESTHESIA (OUTPATIENT)
Dept: PERIOP | Facility: HOSPITAL | Age: 52
End: 2022-07-13

## 2022-07-13 ENCOUNTER — HOSPITAL ENCOUNTER (OUTPATIENT)
Facility: HOSPITAL | Age: 52
Setting detail: HOSPITAL OUTPATIENT SURGERY
Discharge: HOME | End: 2022-07-13
Attending: SURGERY | Admitting: SURGERY

## 2022-07-13 ENCOUNTER — ANESTHESIA EVENT (OUTPATIENT)
Dept: PERIOP | Facility: HOSPITAL | Age: 52
End: 2022-07-13

## 2022-07-13 VITALS
HEIGHT: 74 IN | TEMPERATURE: 97.9 F | HEART RATE: 63 BPM | DIASTOLIC BLOOD PRESSURE: 75 MMHG | RESPIRATION RATE: 16 BRPM | BODY MASS INDEX: 32.08 KG/M2 | OXYGEN SATURATION: 98 % | SYSTOLIC BLOOD PRESSURE: 120 MMHG | WEIGHT: 250 LBS

## 2022-07-13 DIAGNOSIS — Z86.010 HISTORY OF COLONIC POLYPS: ICD-10-CM

## 2022-07-13 DIAGNOSIS — Z12.11 SCREENING FOR COLON CANCER: ICD-10-CM

## 2022-07-13 DIAGNOSIS — K62.5 RECTAL BLEEDING: ICD-10-CM

## 2022-07-13 LAB — GLUCOSE BLDC GLUCOMTR-MCNC: 98 MG/DL (ref 70–130)

## 2022-07-13 PROCEDURE — 25010000002 PROPOFOL 10 MG/ML EMULSION: Performed by: NURSE ANESTHETIST, CERTIFIED REGISTERED

## 2022-07-13 PROCEDURE — 88305 TISSUE EXAM BY PATHOLOGIST: CPT

## 2022-07-13 PROCEDURE — 82962 GLUCOSE BLOOD TEST: CPT

## 2022-07-13 RX ORDER — OXYCODONE HYDROCHLORIDE AND ACETAMINOPHEN 5; 325 MG/1; MG/1
1 TABLET ORAL ONCE AS NEEDED
Status: DISCONTINUED | OUTPATIENT
Start: 2022-07-13 | End: 2022-07-13 | Stop reason: HOSPADM

## 2022-07-13 RX ORDER — FENTANYL CITRATE 50 UG/ML
50 INJECTION, SOLUTION INTRAMUSCULAR; INTRAVENOUS
Status: DISCONTINUED | OUTPATIENT
Start: 2022-07-13 | End: 2022-07-13 | Stop reason: HOSPADM

## 2022-07-13 RX ORDER — SODIUM CHLORIDE 0.9 % (FLUSH) 0.9 %
10 SYRINGE (ML) INJECTION AS NEEDED
Status: DISCONTINUED | OUTPATIENT
Start: 2022-07-13 | End: 2022-07-13 | Stop reason: HOSPADM

## 2022-07-13 RX ORDER — ONDANSETRON 2 MG/ML
4 INJECTION INTRAMUSCULAR; INTRAVENOUS AS NEEDED
Status: DISCONTINUED | OUTPATIENT
Start: 2022-07-13 | End: 2022-07-13 | Stop reason: HOSPADM

## 2022-07-13 RX ORDER — KETOROLAC TROMETHAMINE 30 MG/ML
30 INJECTION, SOLUTION INTRAMUSCULAR; INTRAVENOUS EVERY 6 HOURS PRN
Status: DISCONTINUED | OUTPATIENT
Start: 2022-07-13 | End: 2022-07-13 | Stop reason: HOSPADM

## 2022-07-13 RX ORDER — SODIUM CHLORIDE 0.9 % (FLUSH) 0.9 %
10 SYRINGE (ML) INJECTION EVERY 12 HOURS SCHEDULED
Status: DISCONTINUED | OUTPATIENT
Start: 2022-07-13 | End: 2022-07-13 | Stop reason: HOSPADM

## 2022-07-13 RX ORDER — SODIUM CHLORIDE, SODIUM LACTATE, POTASSIUM CHLORIDE, CALCIUM CHLORIDE 600; 310; 30; 20 MG/100ML; MG/100ML; MG/100ML; MG/100ML
100 INJECTION, SOLUTION INTRAVENOUS ONCE AS NEEDED
Status: DISCONTINUED | OUTPATIENT
Start: 2022-07-13 | End: 2022-07-13 | Stop reason: HOSPADM

## 2022-07-13 RX ORDER — PROPOFOL 10 MG/ML
VIAL (ML) INTRAVENOUS AS NEEDED
Status: DISCONTINUED | OUTPATIENT
Start: 2022-07-13 | End: 2022-07-13 | Stop reason: SURG

## 2022-07-13 RX ORDER — SODIUM CHLORIDE, SODIUM LACTATE, POTASSIUM CHLORIDE, CALCIUM CHLORIDE 600; 310; 30; 20 MG/100ML; MG/100ML; MG/100ML; MG/100ML
INJECTION, SOLUTION INTRAVENOUS CONTINUOUS PRN
Status: DISCONTINUED | OUTPATIENT
Start: 2022-07-13 | End: 2022-07-13 | Stop reason: SURG

## 2022-07-13 RX ORDER — MEPERIDINE HYDROCHLORIDE 25 MG/ML
12.5 INJECTION INTRAMUSCULAR; INTRAVENOUS; SUBCUTANEOUS
Status: DISCONTINUED | OUTPATIENT
Start: 2022-07-13 | End: 2022-07-13 | Stop reason: HOSPADM

## 2022-07-13 RX ORDER — SODIUM CHLORIDE, SODIUM LACTATE, POTASSIUM CHLORIDE, CALCIUM CHLORIDE 600; 310; 30; 20 MG/100ML; MG/100ML; MG/100ML; MG/100ML
125 INJECTION, SOLUTION INTRAVENOUS ONCE
Status: DISCONTINUED | OUTPATIENT
Start: 2022-07-13 | End: 2022-07-13 | Stop reason: HOSPADM

## 2022-07-13 RX ORDER — LIDOCAINE HYDROCHLORIDE 20 MG/ML
INJECTION, SOLUTION INFILTRATION; PERINEURAL AS NEEDED
Status: DISCONTINUED | OUTPATIENT
Start: 2022-07-13 | End: 2022-07-13 | Stop reason: SURG

## 2022-07-13 RX ORDER — MIDAZOLAM HYDROCHLORIDE 1 MG/ML
1 INJECTION INTRAMUSCULAR; INTRAVENOUS
Status: DISCONTINUED | OUTPATIENT
Start: 2022-07-13 | End: 2022-07-13 | Stop reason: HOSPADM

## 2022-07-13 RX ORDER — IPRATROPIUM BROMIDE AND ALBUTEROL SULFATE 2.5; .5 MG/3ML; MG/3ML
3 SOLUTION RESPIRATORY (INHALATION) ONCE AS NEEDED
Status: DISCONTINUED | OUTPATIENT
Start: 2022-07-13 | End: 2022-07-13 | Stop reason: HOSPADM

## 2022-07-13 RX ADMIN — LIDOCAINE HYDROCHLORIDE 60 MG: 20 INJECTION, SOLUTION INFILTRATION; PERINEURAL at 07:38

## 2022-07-13 RX ADMIN — PROPOFOL 100 MCG/KG/MIN: 10 INJECTION, EMULSION INTRAVENOUS at 07:38

## 2022-07-13 RX ADMIN — SODIUM CHLORIDE, POTASSIUM CHLORIDE, SODIUM LACTATE AND CALCIUM CHLORIDE: 600; 310; 30; 20 INJECTION, SOLUTION INTRAVENOUS at 07:36

## 2022-07-13 RX ADMIN — PROPOFOL 100 MG: 10 INJECTION, EMULSION INTRAVENOUS at 07:38

## 2022-07-13 NOTE — ANESTHESIA POSTPROCEDURE EVALUATION
Patient: Jamarcus Allison    Procedure Summary     Date: 07/13/22 Room / Location: [facility] OR  /  [facility] OR    Anesthesia Start: 0736 Anesthesia Stop: 0749    Procedure: COLONOSCOPY (N/A ) Diagnosis:       Screening for colon cancer      History of colonic polyps      Rectal bleeding      (Screening for colon cancer [Z12.11])      (History of colonic polyps [Z86.010])      (Rectal bleeding [K62.5])    Surgeons: Freddy Mortensen MD Provider: Reynold Nelson MD    Anesthesia Type: general ASA Status: 3          Anesthesia Type: general    Vitals  Vitals Value Taken Time   /75 07/13/22 0822   Temp 97.9 °F (36.6 °C) 07/13/22 0752   Pulse 63 07/13/22 0822   Resp 16 07/13/22 0822   SpO2 98 % 07/13/22 0822           Post Anesthesia Care and Evaluation    Patient location during evaluation: PACU  Patient participation: complete - patient participated  Level of consciousness: awake  Pain score: 1  Pain management: adequate    Airway patency: patent  Anesthetic complications: No anesthetic complications  PONV Status: controlled  Cardiovascular status: acceptable and blood pressure returned to baseline  Respiratory status: acceptable and room air  Hydration status: acceptable    Comments: Patient comfortable with discharge at this time.

## 2022-07-13 NOTE — ANESTHESIA PREPROCEDURE EVALUATION
Anesthesia Evaluation     Patient summary reviewed and Nursing notes reviewed   no history of anesthetic complications:  NPO Solid Status: > 8 hours  NPO Liquid Status: > 8 hours           Airway   Mallampati: I  TM distance: >3 FB  Neck ROM: full  No difficulty expected  Dental - normal exam     Pulmonary - negative pulmonary ROS and normal exam    breath sounds clear to auscultation  (-) asthma, not a smoker  Cardiovascular - normal exam    Rhythm: regular  Rate: normal    (+) hypertension, PVD, hyperlipidemia,       Neuro/Psych  (+) seizures (Childhood), psychiatric history (Schizoid) PTSD and Depression,    GI/Hepatic/Renal/Endo    (+)  GERD, GI bleeding lower resolved, liver disease fatty liver disease, diabetes mellitus well controlled,     Musculoskeletal     Abdominal  - normal exam   Substance History - negative use     OB/GYN negative ob/gyn ROS         Other   arthritis,                      Anesthesia Plan    ASA 3     general   total IV anesthesia  intravenous induction     Anesthetic plan, risks, benefits, and alternatives have been provided, discussed and informed consent has been obtained with: patient.  Use of blood products discussed with patient  Consented to blood products.       CODE STATUS:

## 2022-07-14 LAB — REF LAB TEST METHOD: NORMAL

## 2022-07-25 ENCOUNTER — OFFICE VISIT (OUTPATIENT)
Dept: PSYCHIATRY | Facility: CLINIC | Age: 52
End: 2022-07-25

## 2022-07-25 VITALS
HEART RATE: 102 BPM | HEIGHT: 74 IN | DIASTOLIC BLOOD PRESSURE: 78 MMHG | WEIGHT: 263 LBS | SYSTOLIC BLOOD PRESSURE: 120 MMHG | BODY MASS INDEX: 33.75 KG/M2

## 2022-07-25 DIAGNOSIS — F43.10 PTSD (POST-TRAUMATIC STRESS DISORDER): ICD-10-CM

## 2022-07-25 DIAGNOSIS — G47.09 OTHER INSOMNIA: ICD-10-CM

## 2022-07-25 DIAGNOSIS — F60.1 SCHIZOID PERSONALITY DISORDER IN ADULT: ICD-10-CM

## 2022-07-25 DIAGNOSIS — F41.1 GENERALIZED ANXIETY DISORDER: Primary | ICD-10-CM

## 2022-07-25 DIAGNOSIS — Z79.899 MEDICATION MANAGEMENT: ICD-10-CM

## 2022-07-25 PROCEDURE — 99214 OFFICE O/P EST MOD 30 MIN: CPT | Performed by: NURSE PRACTITIONER

## 2022-07-25 RX ORDER — TRAZODONE HYDROCHLORIDE 150 MG/1
150 TABLET ORAL NIGHTLY
Qty: 30 TABLET | Refills: 2 | Status: SHIPPED | OUTPATIENT
Start: 2022-07-25 | End: 2022-09-12 | Stop reason: SDUPTHER

## 2022-07-25 RX ORDER — FLUOXETINE HYDROCHLORIDE 40 MG/1
80 CAPSULE ORAL DAILY
Qty: 60 CAPSULE | Refills: 2 | Status: SHIPPED | OUTPATIENT
Start: 2022-07-25 | End: 2022-09-12 | Stop reason: SDUPTHER

## 2022-07-25 RX ORDER — HYDROXYZINE HYDROCHLORIDE 10 MG/1
10 TABLET, FILM COATED ORAL 2 TIMES DAILY PRN
Qty: 60 TABLET | Refills: 2 | Status: SHIPPED | OUTPATIENT
Start: 2022-07-25 | End: 2022-09-12 | Stop reason: SDUPTHER

## 2022-07-25 NOTE — PROGRESS NOTES
"Subjective   Jamarcus Allison is a 52 y.o. male who presents today for follow up    Chief Complaint:  Anxiety    History of Present Illness: Patient presents as follow up. States he has been \"restless\", reports his neighbor has been \"screaming for death\", states he had a medical procedure that has caused him severe pain. States he had a colonoscopy in which they removed a polyp, however he has not gotten the results yet.  Reports lack of sleep has increased his anxiety and paranoia.  States he has not been having any AVH however paranoia and some fleeting suicidal ideations have been present that worsen at night.  He reports appetite is fair, states A1c has increased again and PCP has made some medication changes.  He does report fleeting suicidal ideations however he adamantly denies intent.  He denies HI/AVH.    The following portions of the patient's history were reviewed and updated as appropriate: allergies, current medications, past family history, past medical history, past social history, past surgical history and problem list.      Past Medical History:  Past Medical History:   Diagnosis Date   • Arthritis    • Colon polyp    • Depression    • Diabetes mellitus (HCC)    • Diabetic neuropathy (HCC)    • Elevated cholesterol    • GERD (gastroesophageal reflux disease)    • Hypertension    • IBS (irritable bowel syndrome)    • Peripheral artery disease (HCC)    • PTSD (post-traumatic stress disorder)    • Rectal bleeding    • Schizoid personality (HCC)        Social History:  Social History     Socioeconomic History   • Marital status: Single   Tobacco Use   • Smoking status: Never Smoker   • Smokeless tobacco: Never Used   Vaping Use   • Vaping Use: Never used   Substance and Sexual Activity   • Alcohol use: Never   • Drug use: Never   • Sexual activity: Defer       Family History:  Family History   Problem Relation Age of Onset   • Heart disease Mother    • Diabetes Mother    • Cancer Father    • Alcohol " abuse Father    • Heart disease Sister    • Diabetes Sister    • Cancer Sister    • Thyroid disease Sister    • Depression Sister    • Anxiety disorder Sister    • Depression Brother    • Anxiety disorder Brother    • Alcohol abuse Brother        Past Surgical History:  Past Surgical History:   Procedure Laterality Date   • CATARACT EXTRACTION     • COLONOSCOPY     • COLONOSCOPY N/A 7/13/2022    Procedure: COLONOSCOPY;  Surgeon: Freddy Mortensen MD;  Location: [facility];  Service: Gastroenterology;  Laterality: N/A;   • EYE SURGERY     • POLYPECTOMY         Problem List:  Patient Active Problem List   Diagnosis   • Type 2 diabetes mellitus with hyperglycemia, without long-term current use of insulin (HCC)   • Screening for colon cancer   • History of colonic polyps   • Rectal bleeding       Allergy:   Allergies   Allergen Reactions   • Poison Ivy Extract Anaphylaxis   • Poison Oak Extract Anaphylaxis   • Sumac Anaphylaxis        Current Medications:   Current Outpatient Medications   Medication Sig Dispense Refill   • atorvastatin (LIPITOR) 40 MG tablet Take 1 tablet by mouth Every Night. 90 tablet 1   • Blood Glucose Monitoring Suppl (B-D LOGIC BLOOD GLUCOSE) w/Device kit 1 Device Daily. 1 each 0   • empagliflozin (Jardiance) 10 MG tablet tablet Take 1 tablet by mouth Daily. 90 tablet 0   • FLUoxetine (PROzac) 40 MG capsule Take 2 capsules by mouth Daily. 60 capsule 2   • glipiZIDE-metFORMIN (METAGLIP) 5-500 MG per tablet Take 1 tablet by mouth 2 (Two) Times a Day Before Meals. 180 tablet 0   • glucose blood test strip Check daily 200 each 12   • hydrOXYzine (ATARAX) 10 MG tablet Take 1 tablet by mouth 2 (Two) Times a Day As Needed for Anxiety. 60 tablet 2   • Lancets (freestyle) lancets Check daily fasting 200 each 12   • lisinopril (PRINIVIL,ZESTRIL) 5 MG tablet Take 1 tablet by mouth Daily. 90 tablet 1   • loratadine (CLARITIN) 10 MG tablet Take 1 tablet by mouth Daily. 90 tablet 0   • pioglitazone (ACTOS)  "45 MG tablet Take 1 tablet by mouth Daily. 90 tablet 0   • sodium-potassium-magnesium sulfates (Suprep Bowel Prep Kit) 17.5-3.13-1.6 GM/177ML solution oral solution Take 2 bottles by mouth Take As Directed for 2 doses. 354 mL 0   • traZODone (DESYREL) 150 MG tablet Take 1 tablet by mouth Every Night. 30 tablet 2   • Brexpiprazole 2 MG tablet Take 1 tablet by mouth Daily. 30 tablet 2     No current facility-administered medications for this visit.       Review of Symptoms:    Review of Systems   Constitutional: Positive for fatigue.   HENT: Negative.    Eyes: Negative.    Respiratory: Negative.    Cardiovascular: Negative.    Gastrointestinal: Negative.    Genitourinary: Negative.    Musculoskeletal: Negative.    Skin: Negative.    Neurological: Negative.    Psychiatric/Behavioral: Positive for sleep disturbance, depressed mood and stress. Negative for suicidal ideas. The patient is nervous/anxious.        Objective   Physical Exam:   Blood pressure 120/78, pulse 102, height 188 cm (74\"), weight 119 kg (263 lb).  Body mass index is 33.77 kg/m².    Appearance: Obese male, appropriately dressed, appears stated age in no acute distress  Gait, Station, Strength: Within normal limits    Mental Status Exam:   Hygiene:   good  Cooperation:  Cooperative  Eye Contact:  Good  Psychomotor Behavior:  Appropriate  Affect:  Appropriate  Mood: anxious  Hopelessness: 4  Speech:  Normal  Thought Process:  Linear  Thought Content:  Normal and Mood congruent  Suicidal:  None  Homicidal:  None  Hallucinations:  None  Delusion:  None  Memory:  Intact  Orientation:  Person, Place, Time and Situation  Reliability:  good  Insight:  Fair  Judgement:  Fair  Impulse Control:  Good  Physical/Medical Issues:  Yes DM     PHQ-Score Total:  PHQ-9 Total Score: 1         Lab Results:   Admission on 07/13/2022, Discharged on 07/13/2022   Component Date Value Ref Range Status   • Glucose 07/13/2022 98  70 - 130 mg/dL Final    Meter: [id number] " : [id number] [place] WINTER   • Reference Lab Report 2022    Final                    Value:[facility]  [address] [place] [address]  Phone: [phone] or [phone]  Fax: [phone]  Reynold Glover M.D., Medical Director    PATIENT NAME                                     LABORATORY NO.  MANUEL BLUE                                 [id number]  [id number]                                 AGE                    SEX   SSN              CLIENT REF #  [facility] NURYS                      52        1970           [id number]      [id number]    [address]                             REQUESTING M.D.           ATTENDING [place] [address]                           KAIA PETERSON  DATE COLLECTED            DATE RECEIVED          DATE REPORTED  2022    DIAGNOSIS:  DESCENDING COLON POLYP:  Tubular adenoma  No high grade dysplasia identified    CLINICAL HISTORY:  Screening for colon cancer, history of colonic polyps, rectal                           bleeding    SPECIMENS RECEIVED:  DESCENDING COLON POLYP    MICROSCOPIC DESCRIPTION:  Tissue blocks are prepared and slides are examined microscopically on all  specimens. See diagnosis for details.    Professional interpretation rendered by Nai Álvarez D.O., F.C.A.P. at  [facility], [address] [place] [address].    GROSS DESCRIPTION:  Pieces: Multiple.  Aggregate dimensions: 0.5 x 0.5 x 0.2 cm.  Cassettes:  One,  entirely submitted.  MTH    REVIEWED, DIAGNOSED AND ELECTRONICALLY  SIGNED BY:    Nai Álvarez D.O., F.C.A.P.  CPT CODES:  77729     Lab on 2022   Component Date Value Ref Range Status   • COVID19 2022 Not Detected  Not Detected - Ref. Range Final   • Influenza A PCR 2022 Not Detected  Not Detected Final   • Influenza B PCR 2022 Not Detected  Not Detected Final        Assessment & Plan   Diagnoses and all orders for this visit:    1. Generalized anxiety disorder (Primary)  -     FLUoxetine (PROzac) 40 MG capsule; Take 2 capsules by mouth Daily.  Dispense: 60 capsule; Refill: 2  -     hydrOXYzine (ATARAX) 10 MG tablet; Take 1 tablet by mouth 2 (Two) Times a Day As Needed for Anxiety.  Dispense: 60 tablet; Refill: 2  -     traZODone (DESYREL) 150 MG tablet; Take 1 tablet by mouth Every Night.  Dispense: 30 tablet; Refill: 2  -     Brexpiprazole 2 MG tablet; Take 1 tablet by mouth Daily.  Dispense: 30 tablet; Refill: 2    2. Schizoid personality disorder in adult (HCC)  -     FLUoxetine (PROzac) 40 MG capsule; Take 2 capsules by mouth Daily.  Dispense: 60 capsule; Refill: 2  -     Brexpiprazole 2 MG tablet; Take 1 tablet by mouth Daily.  Dispense: 30 tablet; Refill: 2    3. Other insomnia  -     traZODone (DESYREL) 150 MG tablet; Take 1 tablet by mouth Every Night.  Dispense: 30 tablet; Refill: 2    4. Medication management    5. PTSD (post-traumatic stress disorder)        -Increase brexpiprazole 2 mg daily for AVH and paranoia. Lengthy discussion with patient on the possible side effects of antipsychotic medications including increased cholesterol, increased blood sugar, and possibility of weight gain.  Also discussed the need to monitor lab work associated with this. The risk of muscle movement disorders with this class of medication was also discussed.  -Increase trazodone 150 mg nightly for sleep  -Continue fluoxetine 80 mg daily for anxiety and depression  -Continue hydroxyzine 10 mg twice daily as needed for anxiety  -Suggested patient use ear plugs and sound machine to help dampen neighbor during the night  -Encouraged patient to continue therapy  -YADIEL reviewed and appropriate. Patient counseled on use of controlled substances.  -The benefits of a healthy diet and exercise were discussed with patient, especially the positive effects they have on mental health.  Patient encouraged to consider lifestyle modification regarding  diet and exercise patterns to maximize results of mental health treatment.  -Reviewed previous available documentation  -Reviewed most recent available labs                Visit Diagnoses:    ICD-10-CM ICD-9-CM   1. Generalized anxiety disorder  F41.1 300.02   2. Schizoid personality disorder in adult (HCC)  F60.1 301.20   3. Other insomnia  G47.09 780.52   4. Medication management  Z79.899 V58.69   5. PTSD (post-traumatic stress disorder)  F43.10 309.81         TREATMENT PLAN/GOALS: Continue supportive psychotherapy efforts and medications as indicated. Treatment and medication options discussed during today's visit. Patient acknowledged and verbally consented to continue with current treatment plan and was educated on the importance of compliance with treatment and follow-up appointments.    MEDICATION ISSUES:    Discussed medication options and treatment plan of prescribed medication as well as the risks, benefits, and side effects including potential falls, possible impaired driving and metabolic adversities among others. Patient is agreeable to call the office with any worsening of symptoms or onset of side effects. Patient is agreeable to call 911 or go to the nearest ER should he/she begin having SI/HI.     MEDS ORDERED DURING VISIT:  New Medications Ordered This Visit   Medications   • FLUoxetine (PROzac) 40 MG capsule     Sig: Take 2 capsules by mouth Daily.     Dispense:  60 capsule     Refill:  2   • hydrOXYzine (ATARAX) 10 MG tablet     Sig: Take 1 tablet by mouth 2 (Two) Times a Day As Needed for Anxiety.     Dispense:  60 tablet     Refill:  2   • traZODone (DESYREL) 150 MG tablet     Sig: Take 1 tablet by mouth Every Night.     Dispense:  30 tablet     Refill:  2   • Brexpiprazole 2 MG tablet     Sig: Take 1 tablet by mouth Daily.     Dispense:  30 tablet     Refill:  2       Return in about 6 weeks (around 9/5/2022), or if symptoms  worsen or fail to improve.         Prognosis: Guarded dependent on medication/follow up and treatment plan compliance.  Functionality: pt showing improvements in important areas of daily functioning.     Short-term goals: Patient will adhere to medication regimen and note continued improvement in symptoms over the next 3 months.   Long-term goals: Patient will be adherent to medication management and psychotherapy with continued improvement in symptoms over the next 6 months          This document has been electronically signed by RUPINDER Gann   July 25, 2022 15:55 EDT    Part of this note may be an electronic transcription/translation of spoken language to printed text using the Dragon Dictation System.

## 2022-07-27 ENCOUNTER — OFFICE VISIT (OUTPATIENT)
Dept: SURGERY | Facility: CLINIC | Age: 52
End: 2022-07-27

## 2022-07-27 ENCOUNTER — PATIENT ROUNDING (BHMG ONLY) (OUTPATIENT)
Dept: SURGERY | Facility: CLINIC | Age: 52
End: 2022-07-27

## 2022-07-27 VITALS — HEIGHT: 74 IN | BODY MASS INDEX: 32.08 KG/M2 | WEIGHT: 250 LBS

## 2022-07-27 DIAGNOSIS — Z86.010 HISTORY OF COLONIC POLYPS: Primary | ICD-10-CM

## 2022-07-27 DIAGNOSIS — K62.5 RECTAL BLEEDING: ICD-10-CM

## 2022-07-27 PROCEDURE — 99212 OFFICE O/P EST SF 10 MIN: CPT

## 2022-07-27 RX ORDER — PRAMOXINE HYDROCHLORIDE HYDROCORTISONE ACETATE 100; 100 MG/10G; MG/10G
1 AEROSOL, FOAM TOPICAL 2 TIMES DAILY
Qty: 1 EACH | Refills: 0 | Status: SHIPPED | OUTPATIENT
Start: 2022-07-27

## 2022-07-27 RX ORDER — HYDROCORTISONE ACETATE 25 MG/1
25 SUPPOSITORY RECTAL 2 TIMES DAILY PRN
Qty: 20 SUPPOSITORY | Refills: 1 | Status: SHIPPED | OUTPATIENT
Start: 2022-07-27 | End: 2022-08-06

## 2022-07-27 NOTE — PROGRESS NOTES
Subjective   Jamarcus Allison is a 52 y.o. male who presents today for Follow Up    Chief Complaint:    Chief Complaint   Patient presents with   • Post-op        History of Present Illness:    History of Present Illness Luis Alfredo is a 52-year-old male who presents status post colonoscopy.  Dr. Mortensen performed a colonoscopy on 7/13/2022.  Patient states that he has been doing well since colonoscopy.  However, he reports that he has had slight rectal pain and some rectal bleeding.  States that he has seen blood on toilet paper when wiping.    The following portions of the patient's history were reviewed and updated as appropriate: allergies, current medications, past family history, past medical history, past social history, past surgical history and problem list.    Past Medical History:  Past Medical History:   Diagnosis Date   • Arthritis    • Colon polyp    • Depression    • Diabetes mellitus (HCC)    • Diabetic neuropathy (HCC)    • Elevated cholesterol    • GERD (gastroesophageal reflux disease)    • Hypertension    • IBS (irritable bowel syndrome)    • Peripheral artery disease (HCC)    • PTSD (post-traumatic stress disorder)    • Rectal bleeding    • Schizoid personality (HCC)        Social History:  Social History     Socioeconomic History   • Marital status: Single   Tobacco Use   • Smoking status: Never Smoker   • Smokeless tobacco: Never Used   Vaping Use   • Vaping Use: Never used   Substance and Sexual Activity   • Alcohol use: Never   • Drug use: Never   • Sexual activity: Defer       Family History:  Family History   Problem Relation Age of Onset   • Heart disease Mother    • Diabetes Mother    • Cancer Father    • Alcohol abuse Father    • Heart disease Sister    • Diabetes Sister    • Cancer Sister    • Thyroid disease Sister    • Depression Sister    • Anxiety disorder Sister    • Depression Brother    • Anxiety disorder Brother    • Alcohol abuse Brother        Past Surgical History:  Past Surgical  History:   Procedure Laterality Date   • CATARACT EXTRACTION     • COLONOSCOPY     • COLONOSCOPY N/A 7/13/2022    Procedure: COLONOSCOPY;  Surgeon: Freddy Mortensen MD;  Location: [facility];  Service: Gastroenterology;  Laterality: N/A;   • EYE SURGERY     • POLYPECTOMY         Problem List:  Patient Active Problem List   Diagnosis   • Type 2 diabetes mellitus with hyperglycemia, without long-term current use of insulin (HCC)   • Screening for colon cancer   • History of colonic polyps   • Rectal bleeding       Allergy:   Allergies   Allergen Reactions   • Poison Ivy Extract Anaphylaxis   • Poison Oak Extract Anaphylaxis   • Sumac Anaphylaxis        Current Medications:   Current Outpatient Medications   Medication Sig Dispense Refill   • atorvastatin (LIPITOR) 40 MG tablet Take 1 tablet by mouth Every Night. 90 tablet 1   • Blood Glucose Monitoring Suppl (B-D LOGIC BLOOD GLUCOSE) w/Device kit 1 Device Daily. 1 each 0   • Brexpiprazole 2 MG tablet Take 1 tablet by mouth Daily. 30 tablet 2   • empagliflozin (Jardiance) 10 MG tablet tablet Take 1 tablet by mouth Daily. 90 tablet 0   • FLUoxetine (PROzac) 40 MG capsule Take 2 capsules by mouth Daily. 60 capsule 2   • glipiZIDE-metFORMIN (METAGLIP) 5-500 MG per tablet Take 1 tablet by mouth 2 (Two) Times a Day Before Meals. 180 tablet 0   • glucose blood test strip Check daily 200 each 12   • hydrOXYzine (ATARAX) 10 MG tablet Take 1 tablet by mouth 2 (Two) Times a Day As Needed for Anxiety. 60 tablet 2   • Lancets (freestyle) lancets Check daily fasting 200 each 12   • lisinopril (PRINIVIL,ZESTRIL) 5 MG tablet Take 1 tablet by mouth Daily. 90 tablet 1   • loratadine (CLARITIN) 10 MG tablet Take 1 tablet by mouth Daily. 90 tablet 0   • pioglitazone (ACTOS) 45 MG tablet Take 1 tablet by mouth Daily. 90 tablet 0   • sodium-potassium-magnesium sulfates (Suprep Bowel Prep Kit) 17.5-3.13-1.6 GM/177ML solution oral solution Take 2 bottles by mouth Take As Directed for 2  "doses. 354 mL 0   • traZODone (DESYREL) 150 MG tablet Take 1 tablet by mouth Every Night. 30 tablet 2   • hydrocortisone (ANUSOL-HC) 25 MG suppository Insert 1 suppository into the rectum 2 (Two) Times a Day As Needed for Hemorrhoids for up to 10 days. 20 suppository 1     No current facility-administered medications for this visit.       Review of Systems:    Review of Systems   Constitutional: Negative for activity change.   HENT: Negative for congestion.    Eyes: Negative for blurred vision.   Respiratory: Negative for shortness of breath.    Cardiovascular: Negative for chest pain.   Gastrointestinal: Positive for anal bleeding.   Endocrine: Negative for cold intolerance.   Genitourinary: Negative for flank pain.   Musculoskeletal: Negative for arthralgias.   Skin: Negative for bruise.   Allergic/Immunologic: Negative for environmental allergies.   Neurological: Negative for confusion.   Hematological: Negative for adenopathy.   Psychiatric/Behavioral: Negative for agitation.         Physical Exam:   Physical Exam    Vitals:  Height 188 cm (74.02\"), weight 113 kg (250 lb).   Body mass index is 32.08 kg/m².     Lab Results:   Admission on 2022, Discharged on 2022   Component Date Value Ref Range Status   • Glucose 2022 98  70 - 130 mg/dL Final    Meter: [id number] : [id number] [place] WINTER   • Reference Lab Report 2022    Final                    Value:[facility]  [address] [place] [address]  Phone: [phone] or [phone]  Fax: [phone]  Reynold Glover M.D., Medical Director    PATIENT NAME                                     LABORATORY NO.  MANUEL BLUE                                 [id number]  [id number]                                 AGE                    SEX   SSN              CLIENT REF #  [facility]                      52        1970      M     [id number]      [id number]    [address]   "                           REQUESTING M.D.           ATTENDING M.D.         COPY TOMarlene  ALEAH NUNO [id number]                           KAIA PETERSON  DATE COLLECTED            DATE RECEIVED          DATE REPORTED  07/13/2022 07/13/2022 07/14/2022    DIAGNOSIS:  DESCENDING COLON POLYP:  Tubular adenoma  No high grade dysplasia identified    CLINICAL HISTORY:  Screening for colon cancer, history of colonic polyps, rectal                           bleeding    SPECIMENS RECEIVED:  DESCENDING COLON POLYP    MICROSCOPIC DESCRIPTION:  Tissue blocks are prepared and slides are examined microscopically on all  specimens. See diagnosis for details.    Professional interpretation rendered by Nai Álvarez D.O., SAIMA at  [facility], [address] [place] [address].    GROSS DESCRIPTION:  Pieces: Multiple.  Aggregate dimensions: 0.5 x 0.5 x 0.2 cm.  Cassettes:  One,  entirely submitted.  MTH    REVIEWED, DIAGNOSED AND ELECTRONICALLY  SIGNED BY:    Nai Álvarez D.O., RODO.  CPT CODES:  00567     Lab on 07/12/2022   Component Date Value Ref Range Status   • COVID19 07/12/2022 Not Detected  Not Detected - Ref. Range Final   • Influenza A PCR 07/12/2022 Not Detected  Not Detected Final   • Influenza B PCR 07/12/2022 Not Detected  Not Detected Final   Office Visit on 05/23/2022   Component Date Value Ref Range Status   • Glucose 05/23/2022 145 (A) 65 - 99 mg/dL Final   • BUN 05/23/2022 9  6 - 20 mg/dL Final   • Creatinine 05/23/2022 0.74 (A) 0.76 - 1.27 mg/dL Final   • Sodium 05/23/2022 135 (A) 136 - 145 mmol/L Final   • Potassium 05/23/2022 4.3  3.5 - 5.2 mmol/L Final   • Chloride 05/23/2022 98  98 - 107 mmol/L Final   • CO2 05/23/2022 29.9 (A) 22.0 - 29.0 mmol/L Final   • Calcium 05/23/2022 9.3  8.6 - 10.5 mg/dL Final   • Total Protein 05/23/2022 6.9  6.0 - 8.5 g/dL Final   • Albumin 05/23/2022 3.98  3.50 - 5.20 g/dL Final   • ALT (SGPT) 05/23/2022 33  1 - 41 U/L Final   • AST (SGOT)  05/23/2022 26  1 - 40 U/L Final   • Alkaline Phosphatase 05/23/2022 94  39 - 117 U/L Final   • Total Bilirubin 05/23/2022 0.5  0.0 - 1.2 mg/dL Final   • Globulin 05/23/2022 2.9  gm/dL Final   • A/G Ratio 05/23/2022 1.4  g/dL Final   • BUN/Creatinine Ratio 05/23/2022 12.2  7.0 - 25.0 Final   • Anion Gap 05/23/2022 7.1  5.0 - 15.0 mmol/L Final   • eGFR 05/23/2022 109.0  >60.0 mL/min/1.73 Final    National Kidney Foundation and American Society of Nephrology (ASN) Task Force recommended calculation based on the Chronic Kidney Disease Epidemiology Collaboration (CKD-EPI) equation refit without adjustment for race.   • WBC 05/23/2022 5.38  3.40 - 10.80 10*3/mm3 Final   • RBC 05/23/2022 4.96  4.14 - 5.80 10*6/mm3 Final   • Hemoglobin 05/23/2022 14.8  13.0 - 17.7 g/dL Final   • Hematocrit 05/23/2022 44.8  37.5 - 51.0 % Final   • MCV 05/23/2022 90.3  79.0 - 97.0 fL Final   • MCH 05/23/2022 29.8  26.6 - 33.0 pg Final   • MCHC 05/23/2022 33.0  31.5 - 35.7 g/dL Final   • RDW 05/23/2022 13.3  12.3 - 15.4 % Final   • RDW-SD 05/23/2022 43.8  37.0 - 54.0 fl Final   • MPV 05/23/2022 11.9  6.0 - 12.0 fL Final   • Platelets 05/23/2022 230  140 - 450 10*3/mm3 Final   • Neutrophil % 05/23/2022 52.8  42.7 - 76.0 % Final   • Lymphocyte % 05/23/2022 35.7  19.6 - 45.3 % Final   • Monocyte % 05/23/2022 7.8  5.0 - 12.0 % Final   • Eosinophil % 05/23/2022 2.6  0.3 - 6.2 % Final   • Basophil % 05/23/2022 0.7  0.0 - 1.5 % Final   • Immature Grans % 05/23/2022 0.4  0.0 - 0.5 % Final   • Neutrophils, Absolute 05/23/2022 2.84  1.70 - 7.00 10*3/mm3 Final   • Lymphocytes, Absolute 05/23/2022 1.92  0.70 - 3.10 10*3/mm3 Final   • Monocytes, Absolute 05/23/2022 0.42  0.10 - 0.90 10*3/mm3 Final   • Eosinophils, Absolute 05/23/2022 0.14  0.00 - 0.40 10*3/mm3 Final   • Basophils, Absolute 05/23/2022 0.04  0.00 - 0.20 10*3/mm3 Final   • Immature Grans, Absolute 05/23/2022 0.02  0.00 - 0.05 10*3/mm3 Final   • nRBC 05/23/2022 0.0  0.0 - 0.2 /100 WBC Final    • Hemoglobin A1C 05/23/2022 9.10 (A) 4.80 - 5.60 % Final   • Total Cholesterol 05/23/2022 143  0 - 200 mg/dL Final   • Triglycerides 05/23/2022 128  0 - 150 mg/dL Final   • HDL Cholesterol 05/23/2022 29 (A) 40 - 60 mg/dL Final   • LDL Cholesterol  05/23/2022 91  0 - 100 mg/dL Final   • VLDL Cholesterol 05/23/2022 23  5 - 40 mg/dL Final   • LDL/HDL Ratio 05/23/2022 3.05   Final         Assessment & Plan   Diagnoses and all orders for this visit:    1. History of colonic polyps (Primary)    2. Rectal bleeding    Other orders  -     hydrocortisone (ANUSOL-HC) 25 MG suppository; Insert 1 suppository into the rectum 2 (Two) Times a Day As Needed for Hemorrhoids for up to 10 days.  Dispense: 20 suppository; Refill: 1    Luis Alfredo is a 52-year-old male who presents status post colonoscopy.  Discussed pathology of polyp on this date.  He complains of rectal bleeding.  Reports that he has noticed it when he wipes after bowel movement.  Patient does report a history of hemorrhoids, I have also discussed that Dr. Mortensen visualize hemorrhoids upon colonoscopy.  I have sent patient Anusol suppositories and to use as needed.  He will return if he has not noted any improvement in condition.  Patient will be placed in recall for a repeat colonoscopy in 7 years.    Visit Diagnoses:  No diagnosis found.      MEDS ORDERED DURING VISIT:  New Medications Ordered This Visit   Medications   • hydrocortisone (ANUSOL-HC) 25 MG suppository     Sig: Insert 1 suppository into the rectum 2 (Two) Times a Day As Needed for Hemorrhoids for up to 10 days.     Dispense:  20 suppository     Refill:  1       No follow-ups on file.             This document has been electronically signed by RUPINDER Izquierdo  July 27, 2022 09:37 EDT    Please note that portions of this note were completed with a voice recognition program.

## 2022-07-27 NOTE — PROGRESS NOTES
July 27, 2022    Hello, may I speak with Jamarcus Allison?    My name is wong    I am  with [facility]  [address] [place] [address]  [place] [address] [phone].    Before we get started may I verify your date of birth? 1970    I am calling to officially welcome you to our practice and ask about your recent visit. Is this a good time to talk? yes    Tell me about your visit with us. What things went well?  well everything was good. I had a good visit.       We're always looking for ways to make our patients' experiences even better. Do you have recommendations on ways we may improve?  no    Overall were you satisfied with your first visit to our practice? yes       I appreciate you taking the time to speak with me today. Is there anything else I can do for you? no      Thank you, and have a great day.

## 2022-08-23 ENCOUNTER — OFFICE VISIT (OUTPATIENT)
Dept: FAMILY MEDICINE CLINIC | Facility: CLINIC | Age: 52
End: 2022-08-23

## 2022-08-23 VITALS
HEIGHT: 74 IN | HEART RATE: 90 BPM | OXYGEN SATURATION: 95 % | BODY MASS INDEX: 33.55 KG/M2 | SYSTOLIC BLOOD PRESSURE: 130 MMHG | TEMPERATURE: 97.7 F | DIASTOLIC BLOOD PRESSURE: 78 MMHG | WEIGHT: 261.4 LBS

## 2022-08-23 DIAGNOSIS — J30.2 SEASONAL ALLERGIES: ICD-10-CM

## 2022-08-23 DIAGNOSIS — E11.65 TYPE 2 DIABETES MELLITUS WITH HYPERGLYCEMIA, WITHOUT LONG-TERM CURRENT USE OF INSULIN: ICD-10-CM

## 2022-08-23 PROCEDURE — 83036 HEMOGLOBIN GLYCOSYLATED A1C: CPT | Performed by: FAMILY MEDICINE

## 2022-08-23 PROCEDURE — 85025 COMPLETE CBC W/AUTO DIFF WBC: CPT | Performed by: FAMILY MEDICINE

## 2022-08-23 PROCEDURE — 99214 OFFICE O/P EST MOD 30 MIN: CPT | Performed by: FAMILY MEDICINE

## 2022-08-23 PROCEDURE — 80053 COMPREHEN METABOLIC PANEL: CPT | Performed by: FAMILY MEDICINE

## 2022-08-23 RX ORDER — BREXPIPRAZOLE 1 MG/1
1 TABLET ORAL DAILY
COMMUNITY
Start: 2022-08-02 | End: 2022-08-23 | Stop reason: ALTCHOICE

## 2022-08-23 RX ORDER — LORATADINE 10 MG/1
10 TABLET ORAL DAILY
Qty: 90 TABLET | Refills: 0 | Status: SHIPPED | OUTPATIENT
Start: 2022-08-23 | End: 2023-02-23 | Stop reason: SDUPTHER

## 2022-08-23 RX ORDER — ATORVASTATIN CALCIUM 40 MG/1
40 TABLET, FILM COATED ORAL NIGHTLY
Qty: 90 TABLET | Refills: 1 | Status: SHIPPED | OUTPATIENT
Start: 2022-08-23 | End: 2022-11-23 | Stop reason: SDUPTHER

## 2022-08-23 RX ORDER — PIOGLITAZONEHYDROCHLORIDE 45 MG/1
45 TABLET ORAL DAILY
Qty: 90 TABLET | Refills: 0 | Status: SHIPPED | OUTPATIENT
Start: 2022-08-23 | End: 2022-11-23 | Stop reason: SDUPTHER

## 2022-08-23 RX ORDER — LISINOPRIL 5 MG/1
5 TABLET ORAL DAILY
Qty: 90 TABLET | Refills: 1 | Status: SHIPPED | OUTPATIENT
Start: 2022-08-23 | End: 2022-11-23 | Stop reason: SDUPTHER

## 2022-08-23 RX ORDER — GLIPIZIDE AND METFORMIN HCL 5; 500 MG/1; MG/1
1 TABLET, FILM COATED ORAL
Qty: 180 TABLET | Refills: 0 | Status: SHIPPED | OUTPATIENT
Start: 2022-08-23 | End: 2022-11-23 | Stop reason: SDUPTHER

## 2022-08-23 NOTE — PROGRESS NOTES
Subjective   Jamarcus Allison is a 52 y.o. male.   Pt presents today with CC of Diabetes      History of Present Illness   1.  Patient is a 52-year-old male here to follow-up on diabetes.  He has BMI of 33.5, his weight has been up and down through the month of July.  He currently takes medic lip, Jardiance 10 mg, lisinopril, Actos, Lipitor.   #2 he follows with psychiatry.  He takes multiple medications including trazodone, hydroxyzine, and 2 mg daily of Rexulti.  This has made weight loss more difficult.  #3 he is agreeable to blood work today.  #4 he has seasonal allergies for which he takes Claritin.           The following portions of the patient's history were reviewed and updated as appropriate: allergies, current medications, past family history, past medical history, past social history, past surgical history and problem list.    Review of Systems   Constitutional: Negative for chills, fever and unexpected weight loss.   HENT: Negative for congestion and sore throat.    Eyes: Negative for blurred vision and visual disturbance.   Respiratory: Negative for cough and wheezing.    Cardiovascular: Negative for chest pain and palpitations.   Gastrointestinal: Negative for abdominal pain and diarrhea.   Endocrine: Negative for cold intolerance and heat intolerance.   Genitourinary: Negative for dysuria.   Musculoskeletal: Negative for arthralgias and neck stiffness.   Neurological: Negative for dizziness, seizures and syncope.   Psychiatric/Behavioral: Negative for self-injury, suicidal ideas and depressed mood.       Objective   Physical Exam  Vitals and nursing note reviewed.   Constitutional:       Appearance: He is well-developed.   HENT:      Head: Normocephalic and atraumatic.      Right Ear: External ear normal.      Left Ear: External ear normal.      Nose: Nose normal.   Eyes:      Conjunctiva/sclera: Conjunctivae normal.      Pupils: Pupils are equal, round, and reactive to light.   Cardiovascular:       Rate and Rhythm: Normal rate and regular rhythm.      Heart sounds: Normal heart sounds.   Pulmonary:      Effort: Pulmonary effort is normal.      Breath sounds: Normal breath sounds.   Abdominal:      General: Bowel sounds are normal.      Palpations: Abdomen is soft.   Musculoskeletal:      Cervical back: Normal range of motion and neck supple.   Skin:     General: Skin is warm and dry.   Neurological:      Mental Status: He is alert and oriented to person, place, and time.   Psychiatric:         Behavior: Behavior normal.           Assessment & Plan   Diagnoses and all orders for this visit:    1. Type 2 diabetes mellitus with hyperglycemia, without long-term current use of insulin (HCC)  -     Hemoglobin A1c; Future  -     CBC Auto Differential; Future  -     Comprehensive Metabolic Panel; Future  -     glipiZIDE-metFORMIN (METAGLIP) 5-500 MG per tablet; Take 1 tablet by mouth 2 (Two) Times a Day Before Meals.  Dispense: 180 tablet; Refill: 0  -     empagliflozin (Jardiance) 25 MG tablet tablet; Take 1 tablet by mouth Daily.  Dispense: 90 tablet; Refill: 0  -     lisinopril (PRINIVIL,ZESTRIL) 5 MG tablet; Take 1 tablet by mouth Daily.  Dispense: 90 tablet; Refill: 1  -     pioglitazone (ACTOS) 45 MG tablet; Take 1 tablet by mouth Daily.  Dispense: 90 tablet; Refill: 0  -     atorvastatin (LIPITOR) 40 MG tablet; Take 1 tablet by mouth Every Night.  Dispense: 90 tablet; Refill: 1  -     glucose blood test strip; Check daily  Dispense: 200 each; Refill: 12  -     Comprehensive Metabolic Panel  Increase Jardiance from 10 mg up to 25 mg assuming that with him gaining his weight back, that his A1c is probably still not at the goal of less than 7.  He is agreeable to the plan.  14 days of 25 mg sample given today.  2. Seasonal allergies  -     loratadine (CLARITIN) 10 MG tablet; Take 1 tablet by mouth Daily.  Dispense: 90 tablet; Refill: 0    #3 medication monitoring    Checking blood counts and renal function with  electrolytes to monitor diabetes as well as his medications.       BMI is >= 30 and <35. (Class 1 Obesity). The following options were offered after discussion;: exercise counseling/recommendations and nutrition counseling/recommendations

## 2022-08-24 LAB
ALBUMIN SERPL-MCNC: 4 G/DL (ref 3.5–5.2)
ALBUMIN/GLOB SERPL: 1.5 G/DL
ALP SERPL-CCNC: 90 U/L (ref 39–117)
ALT SERPL W P-5'-P-CCNC: 34 U/L (ref 1–41)
ANION GAP SERPL CALCULATED.3IONS-SCNC: 9.1 MMOL/L (ref 5–15)
AST SERPL-CCNC: 26 U/L (ref 1–40)
BASOPHILS # BLD AUTO: 0.03 10*3/MM3 (ref 0–0.2)
BASOPHILS NFR BLD AUTO: 0.5 % (ref 0–1.5)
BILIRUB SERPL-MCNC: 0.5 MG/DL (ref 0–1.2)
BUN SERPL-MCNC: 10 MG/DL (ref 6–20)
BUN/CREAT SERPL: 15.6 (ref 7–25)
CALCIUM SPEC-SCNC: 9.1 MG/DL (ref 8.6–10.5)
CHLORIDE SERPL-SCNC: 105 MMOL/L (ref 98–107)
CO2 SERPL-SCNC: 26.9 MMOL/L (ref 22–29)
CREAT SERPL-MCNC: 0.64 MG/DL (ref 0.76–1.27)
DEPRECATED RDW RBC AUTO: 41.5 FL (ref 37–54)
EGFRCR SERPLBLD CKD-EPI 2021: 113.9 ML/MIN/1.73
EOSINOPHIL # BLD AUTO: 0.08 10*3/MM3 (ref 0–0.4)
EOSINOPHIL NFR BLD AUTO: 1.3 % (ref 0.3–6.2)
ERYTHROCYTE [DISTWIDTH] IN BLOOD BY AUTOMATED COUNT: 12.6 % (ref 12.3–15.4)
GLOBULIN UR ELPH-MCNC: 2.7 GM/DL
GLUCOSE SERPL-MCNC: 77 MG/DL (ref 65–99)
HBA1C MFR BLD: 7.1 % (ref 4.8–5.6)
HCT VFR BLD AUTO: 45.6 % (ref 37.5–51)
HGB BLD-MCNC: 15.1 G/DL (ref 13–17.7)
IMM GRANULOCYTES # BLD AUTO: 0.02 10*3/MM3 (ref 0–0.05)
IMM GRANULOCYTES NFR BLD AUTO: 0.3 % (ref 0–0.5)
LYMPHOCYTES # BLD AUTO: 1.74 10*3/MM3 (ref 0.7–3.1)
LYMPHOCYTES NFR BLD AUTO: 27.9 % (ref 19.6–45.3)
MCH RBC QN AUTO: 30 PG (ref 26.6–33)
MCHC RBC AUTO-ENTMCNC: 33.1 G/DL (ref 31.5–35.7)
MCV RBC AUTO: 90.7 FL (ref 79–97)
MONOCYTES # BLD AUTO: 0.39 10*3/MM3 (ref 0.1–0.9)
MONOCYTES NFR BLD AUTO: 6.3 % (ref 5–12)
NEUTROPHILS NFR BLD AUTO: 3.97 10*3/MM3 (ref 1.7–7)
NEUTROPHILS NFR BLD AUTO: 63.7 % (ref 42.7–76)
NRBC BLD AUTO-RTO: 0 /100 WBC (ref 0–0.2)
PLATELET # BLD AUTO: 223 10*3/MM3 (ref 140–450)
PMV BLD AUTO: 13 FL (ref 6–12)
POTASSIUM SERPL-SCNC: 3.7 MMOL/L (ref 3.5–5.2)
PROT SERPL-MCNC: 6.7 G/DL (ref 6–8.5)
RBC # BLD AUTO: 5.03 10*6/MM3 (ref 4.14–5.8)
SODIUM SERPL-SCNC: 141 MMOL/L (ref 136–145)
WBC NRBC COR # BLD: 6.23 10*3/MM3 (ref 3.4–10.8)

## 2022-08-25 ENCOUNTER — TELEPHONE (OUTPATIENT)
Dept: FAMILY MEDICINE CLINIC | Facility: CLINIC | Age: 52
End: 2022-08-25

## 2022-08-25 NOTE — TELEPHONE ENCOUNTER
----- Message from Everardo Venegas DO sent at 8/25/2022  4:54 PM EDT -----  No major concerns on your blood work.  Your hemoglobin A1c came down to 7.1.  It was 12.51 just 1 year ago.  Keep up the good work.    Letter mailed.

## 2022-08-26 DIAGNOSIS — E11.65 TYPE 2 DIABETES MELLITUS WITH HYPERGLYCEMIA, WITHOUT LONG-TERM CURRENT USE OF INSULIN: ICD-10-CM

## 2022-08-26 RX ORDER — PIOGLITAZONEHYDROCHLORIDE 45 MG/1
45 TABLET ORAL DAILY
Qty: 90 TABLET | Refills: 0 | OUTPATIENT
Start: 2022-08-26

## 2022-09-12 ENCOUNTER — OFFICE VISIT (OUTPATIENT)
Dept: PSYCHIATRY | Facility: CLINIC | Age: 52
End: 2022-09-12

## 2022-09-12 VITALS
DIASTOLIC BLOOD PRESSURE: 82 MMHG | WEIGHT: 261 LBS | BODY MASS INDEX: 33.5 KG/M2 | HEART RATE: 68 BPM | HEIGHT: 74 IN | SYSTOLIC BLOOD PRESSURE: 128 MMHG

## 2022-09-12 DIAGNOSIS — F41.1 GENERALIZED ANXIETY DISORDER: ICD-10-CM

## 2022-09-12 DIAGNOSIS — F43.10 PTSD (POST-TRAUMATIC STRESS DISORDER): ICD-10-CM

## 2022-09-12 DIAGNOSIS — F60.1 SCHIZOID PERSONALITY DISORDER IN ADULT: Primary | ICD-10-CM

## 2022-09-12 DIAGNOSIS — F33.3 SEVERE EPISODE OF RECURRENT MAJOR DEPRESSIVE DISORDER, WITH PSYCHOTIC FEATURES: ICD-10-CM

## 2022-09-12 DIAGNOSIS — G47.09 OTHER INSOMNIA: ICD-10-CM

## 2022-09-12 DIAGNOSIS — E66.09 CLASS 1 OBESITY DUE TO EXCESS CALORIES WITH SERIOUS COMORBIDITY AND BODY MASS INDEX (BMI) OF 32.0 TO 32.9 IN ADULT: ICD-10-CM

## 2022-09-12 DIAGNOSIS — Z79.899 MEDICATION MANAGEMENT: ICD-10-CM

## 2022-09-12 PROCEDURE — 99214 OFFICE O/P EST MOD 30 MIN: CPT | Performed by: NURSE PRACTITIONER

## 2022-09-12 RX ORDER — BREXPIPRAZOLE 3 MG/1
3 TABLET ORAL DAILY
Qty: 90 TABLET | Refills: 0 | Status: SHIPPED | OUTPATIENT
Start: 2022-09-12 | End: 2022-11-21

## 2022-09-12 RX ORDER — TRAZODONE HYDROCHLORIDE 150 MG/1
150 TABLET ORAL NIGHTLY
Qty: 90 TABLET | Refills: 0 | Status: SHIPPED | OUTPATIENT
Start: 2022-09-12 | End: 2022-11-21 | Stop reason: SDUPTHER

## 2022-09-12 RX ORDER — HYDROXYZINE HYDROCHLORIDE 10 MG/1
10 TABLET, FILM COATED ORAL 2 TIMES DAILY PRN
Qty: 180 TABLET | Refills: 0 | Status: SHIPPED | OUTPATIENT
Start: 2022-09-12 | End: 2022-11-21 | Stop reason: SDUPTHER

## 2022-09-12 RX ORDER — FLUOXETINE HYDROCHLORIDE 40 MG/1
80 CAPSULE ORAL DAILY
Qty: 180 CAPSULE | Refills: 0 | Status: SHIPPED | OUTPATIENT
Start: 2022-09-12 | End: 2022-11-21 | Stop reason: SDUPTHER

## 2022-09-12 NOTE — PROGRESS NOTES
"Subjective   Jamarcus Allison is a 52 y.o. male who presents today for follow up    Chief Complaint:  Anxiety    History of Present Illness: Patient presents as follow-up.  States everything was going well until the last week and a half, states he has felt more \"down than usual\".  Reports he is struggling to get up and be motivated for the day.  Reports that he has been awakened from his sleep several times with someone calling his name.  States he is also been seeing shadows in his peripheral vision, states both of these started 1 month ago.  Reports he does have some issues sleeping in general primarily due to his neighbors.  Reports appetite is good.  He denies SI/HI.    The following portions of the patient's history were reviewed and updated as appropriate: allergies, current medications, past family history, past medical history, past social history, past surgical history and problem list.      Past Medical History:  Past Medical History:   Diagnosis Date   • Arthritis    • Colon polyp    • Depression    • Diabetes mellitus (HCC)    • Diabetic neuropathy (HCC)    • Elevated cholesterol    • GERD (gastroesophageal reflux disease)    • Hypertension    • IBS (irritable bowel syndrome)    • Peripheral artery disease (HCC)    • PTSD (post-traumatic stress disorder)    • Rectal bleeding    • Schizoid personality (HCC)        Social History:  Social History     Socioeconomic History   • Marital status: Single   Tobacco Use   • Smoking status: Never Smoker   • Smokeless tobacco: Never Used   Vaping Use   • Vaping Use: Never used   Substance and Sexual Activity   • Alcohol use: Never   • Drug use: Never   • Sexual activity: Defer       Family History:  Family History   Problem Relation Age of Onset   • Heart disease Mother    • Diabetes Mother    • Cancer Father    • Alcohol abuse Father    • Heart disease Sister    • Diabetes Sister    • Cancer Sister    • Thyroid disease Sister    • Depression Sister    • Anxiety " disorder Sister    • Depression Brother    • Anxiety disorder Brother    • Alcohol abuse Brother        Past Surgical History:  Past Surgical History:   Procedure Laterality Date   • CATARACT EXTRACTION     • COLONOSCOPY     • COLONOSCOPY N/A 7/13/2022    Procedure: COLONOSCOPY;  Surgeon: Freddy Mortensen MD;  Location: [facility];  Service: Gastroenterology;  Laterality: N/A;   • EYE SURGERY     • POLYPECTOMY         Problem List:  Patient Active Problem List   Diagnosis   • Type 2 diabetes mellitus with hyperglycemia, without long-term current use of insulin (HCC)   • Screening for colon cancer   • History of colonic polyps   • Rectal bleeding       Allergy:   Allergies   Allergen Reactions   • Poison Ivy Extract Anaphylaxis   • Poison Oak Extract Anaphylaxis   • Sumac Anaphylaxis        Current Medications:   Current Outpatient Medications   Medication Sig Dispense Refill   • atorvastatin (LIPITOR) 40 MG tablet Take 1 tablet by mouth Every Night. 90 tablet 1   • Blood Glucose Monitoring Suppl ([employer]D LOGIC BLOOD GLUCOSE) w/Device kit 1 Device Daily. 1 each 0   • empagliflozin (Jardiance) 25 MG tablet tablet Take 1 tablet by mouth Daily. 90 tablet 0   • FLUoxetine (PROzac) 40 MG capsule Take 2 capsules by mouth Daily. 180 capsule 0   • glipiZIDE-metFORMIN (METAGLIP) 5-500 MG per tablet Take 1 tablet by mouth 2 (Two) Times a Day Before Meals. 180 tablet 0   • glucose blood test strip Check daily 200 each 12   • Hydrocort-Pramoxine, Perianal, (Proctofoam HC) 1-1 % rectal foam Insert 1 application into the rectum 2 (Two) Times a Day. 1 each 0   • hydrOXYzine (ATARAX) 10 MG tablet Take 1 tablet by mouth 2 (Two) Times a Day As Needed for Anxiety. 180 tablet 0   • Lancets (freestyle) lancets Check daily fasting 200 each 12   • lisinopril (PRINIVIL,ZESTRIL) 5 MG tablet Take 1 tablet by mouth Daily. 90 tablet 1   • loratadine (CLARITIN) 10 MG tablet Take 1 tablet by mouth Daily. 90 tablet 0   • pioglitazone (ACTOS) 45 MG  "tablet Take 1 tablet by mouth Daily. 90 tablet 0   • traZODone (DESYREL) 150 MG tablet Take 1 tablet by mouth Every Night. 90 tablet 0   • Brexpiprazole (Rexulti) 3 MG tablet Take 1 tablet by mouth Daily. 90 tablet 0     No current facility-administered medications for this visit.       Review of Symptoms:    Review of Systems   Constitutional: Positive for fatigue.   HENT: Negative.    Eyes: Negative.    Respiratory: Negative.    Cardiovascular: Negative.    Gastrointestinal: Negative.    Genitourinary: Negative.    Musculoskeletal: Negative.    Skin: Negative.    Neurological: Negative.    Psychiatric/Behavioral: Positive for hallucinations, sleep disturbance and depressed mood. Negative for suicidal ideas. The patient is nervous/anxious.        Objective   Physical Exam:   Blood pressure 128/82, pulse 68, height 188 cm (74\"), weight 118 kg (261 lb).  Body mass index is 33.51 kg/m².    Appearance: Obese male, appropriately dressed, appears stated age and in no acute distress    Gait, Station, Strength: WNL    Mental Status Exam:   Hygiene:   good  Cooperation:  Cooperative  Eye Contact:  Good  Psychomotor Behavior:  Appropriate  Affect:  Appropriate  Mood: anxious  Hopelessness: 7  Speech:  Minimal  Thought Process:  Linear  Thought Content:  Normal and Mood congruent  Suicidal:  None  Homicidal:  None  Hallucinations:  None  Delusion:  None  Memory:  Intact  Orientation:  Person, Place, Time and Situation  Reliability:  good  Insight:  Fair  Judgement:  Fair  Impulse Control:  Fair  Physical/Medical Issues:  No      PHQ-Score Total:  PHQ-9 Total Score: 7   Patient screened positive for depression based on a PHQ-9 score of 7 on 9/12/2022. Follow-up recommendations include: Prescribed antidepressant medication treatment and Suicide Risk Assessment performed.        Lab Results:   Office Visit on 08/23/2022   Component Date Value Ref Range Status   • Glucose 08/23/2022 77  65 - 99 mg/dL Final   • BUN 08/23/2022 10  " 6 - 20 mg/dL Final   • Creatinine 08/23/2022 0.64 (A) 0.76 - 1.27 mg/dL Final   • Sodium 08/23/2022 141  136 - 145 mmol/L Final   • Potassium 08/23/2022 3.7  3.5 - 5.2 mmol/L Final   • Chloride 08/23/2022 105  98 - 107 mmol/L Final   • CO2 08/23/2022 26.9  22.0 - 29.0 mmol/L Final   • Calcium 08/23/2022 9.1  8.6 - 10.5 mg/dL Final   • Total Protein 08/23/2022 6.7  6.0 - 8.5 g/dL Final   • Albumin 08/23/2022 4.00  3.50 - 5.20 g/dL Final   • ALT (SGPT) 08/23/2022 34  1 - 41 U/L Final   • AST (SGOT) 08/23/2022 26  1 - 40 U/L Final   • Alkaline Phosphatase 08/23/2022 90  39 - 117 U/L Final   • Total Bilirubin 08/23/2022 0.5  0.0 - 1.2 mg/dL Final   • Globulin 08/23/2022 2.7  gm/dL Final   • A/G Ratio 08/23/2022 1.5  g/dL Final   • BUN/Creatinine Ratio 08/23/2022 15.6  7.0 - 25.0 Final   • Anion Gap 08/23/2022 9.1  5.0 - 15.0 mmol/L Final   • eGFR 08/23/2022 113.9  >60.0 mL/min/1.73 Final    National Kidney Foundation and American Society of Nephrology (ASN) Task Force recommended calculation based on the Chronic Kidney Disease Epidemiology Collaboration (CKD-EPI) equation refit without adjustment for race.   • Hemoglobin A1C 08/23/2022 7.10 (A) 4.80 - 5.60 % Final   • WBC 08/23/2022 6.23  3.40 - 10.80 10*3/mm3 Final   • RBC 08/23/2022 5.03  4.14 - 5.80 10*6/mm3 Final   • Hemoglobin 08/23/2022 15.1  13.0 - 17.7 g/dL Final   • Hematocrit 08/23/2022 45.6  37.5 - 51.0 % Final   • MCV 08/23/2022 90.7  79.0 - 97.0 fL Final   • MCH 08/23/2022 30.0  26.6 - 33.0 pg Final   • MCHC 08/23/2022 33.1  31.5 - 35.7 g/dL Final   • RDW 08/23/2022 12.6  12.3 - 15.4 % Final   • RDW-SD 08/23/2022 41.5  37.0 - 54.0 fl Final   • MPV 08/23/2022 13.0 (A) 6.0 - 12.0 fL Final   • Platelets 08/23/2022 223  140 - 450 10*3/mm3 Final   • Neutrophil % 08/23/2022 63.7  42.7 - 76.0 % Final   • Lymphocyte % 08/23/2022 27.9  19.6 - 45.3 % Final   • Monocyte % 08/23/2022 6.3  5.0 - 12.0 % Final   • Eosinophil % 08/23/2022 1.3  0.3 - 6.2 % Final   •  Basophil % 08/23/2022 0.5  0.0 - 1.5 % Final   • Immature Grans % 08/23/2022 0.3  0.0 - 0.5 % Final   • Neutrophils, Absolute 08/23/2022 3.97  1.70 - 7.00 10*3/mm3 Final   • Lymphocytes, Absolute 08/23/2022 1.74  0.70 - 3.10 10*3/mm3 Final   • Monocytes, Absolute 08/23/2022 0.39  0.10 - 0.90 10*3/mm3 Final   • Eosinophils, Absolute 08/23/2022 0.08  0.00 - 0.40 10*3/mm3 Final   • Basophils, Absolute 08/23/2022 0.03  0.00 - 0.20 10*3/mm3 Final   • Immature Grans, Absolute 08/23/2022 0.02  0.00 - 0.05 10*3/mm3 Final   • nRBC 08/23/2022 0.0  0.0 - 0.2 /100 WBC Final       Assessment & Plan   Diagnoses and all orders for this visit:    1. Schizoid personality disorder in adult (HCC) (Primary)  -     FLUoxetine (PROzac) 40 MG capsule; Take 2 capsules by mouth Daily.  Dispense: 180 capsule; Refill: 0  -     Brexpiprazole (Rexulti) 3 MG tablet; Take 1 tablet by mouth Daily.  Dispense: 90 tablet; Refill: 0    2. Generalized anxiety disorder  -     FLUoxetine (PROzac) 40 MG capsule; Take 2 capsules by mouth Daily.  Dispense: 180 capsule; Refill: 0  -     hydrOXYzine (ATARAX) 10 MG tablet; Take 1 tablet by mouth 2 (Two) Times a Day As Needed for Anxiety.  Dispense: 180 tablet; Refill: 0  -     traZODone (DESYREL) 150 MG tablet; Take 1 tablet by mouth Every Night.  Dispense: 90 tablet; Refill: 0  -     Brexpiprazole (Rexulti) 3 MG tablet; Take 1 tablet by mouth Daily.  Dispense: 90 tablet; Refill: 0    3. Other insomnia  -     traZODone (DESYREL) 150 MG tablet; Take 1 tablet by mouth Every Night.  Dispense: 90 tablet; Refill: 0    4. Medication management    5. PTSD (post-traumatic stress disorder)    6. Class 1 obesity due to excess calories with serious comorbidity and body mass index (BMI) of 32.0 to 32.9 in adult    7. Severe episode of recurrent major depressive disorder, with psychotic features (HCC)        -Increase brexpiprazole 3 mg daily for AVH and paranoia. Lengthy discussion with patient on the possible side  effects of antipsychotic medications including increased cholesterol, increased blood sugar, and possibility of weight gain.  Also discussed the need to monitor lab work associated with this. The risk of muscle movement disorders with this class of medication was also discussed.  -Continue trazodone 150 mg nightly for sleep  -Continue fluoxetine 80 mg daily for anxiety and depression  -Continue hydroxyzine 10 mg twice daily as needed for anxiety  -Reviewed labs as ordered by PCP, A1c has improved  -Encouraged patient to restart psychotherapy  -YADIEL reviewed and appropriate. Patient counseled on use of controlled substances.   -The benefits of a healthy diet and exercise were discussed with patient, especially the positive effects they have on mental health. Patient encouraged to consider lifestyle modification regarding  diet and exercise patterns to maximize results of mental health treatment.  -Reviewed previous available documentation  -Reviewed most recent available labs                  Visit Diagnoses:    ICD-10-CM ICD-9-CM   1. Schizoid personality disorder in adult (HCC)  F60.1 301.20   2. Generalized anxiety disorder  F41.1 300.02   3. Other insomnia  G47.09 780.52   4. Medication management  Z79.899 V58.69   5. PTSD (post-traumatic stress disorder)  F43.10 309.81   6. Class 1 obesity due to excess calories with serious comorbidity and body mass index (BMI) of 32.0 to 32.9 in adult  E66.09 278.00    Z68.32 V85.32   7. Severe episode of recurrent major depressive disorder, with psychotic features ([facility])  F33.3 296.34         TREATMENT PLAN/GOALS: Continue supportive psychotherapy efforts and medications as indicated. Treatment and medication options discussed during today's visit. Patient acknowledged and verbally consented to continue with current treatment plan and was educated on the importance of compliance with treatment and follow-up appointments.    MEDICATION ISSUES:    Discussed medication options  and treatment plan of prescribed medication as well as the risks, benefits, and side effects including potential falls, possible impaired driving and metabolic adversities among others. Patient is agreeable to call the office with any worsening of symptoms or onset of side effects. Patient is agreeable to call 911 or go to the nearest ER should he/she begin having SI/HI.     MEDS ORDERED DURING VISIT:  New Medications Ordered This Visit   Medications   • FLUoxetine (PROzac) 40 MG capsule     Sig: Take 2 capsules by mouth Daily.     Dispense:  180 capsule     Refill:  0   • hydrOXYzine (ATARAX) 10 MG tablet     Sig: Take 1 tablet by mouth 2 (Two) Times a Day As Needed for Anxiety.     Dispense:  180 tablet     Refill:  0   • traZODone (DESYREL) 150 MG tablet     Sig: Take 1 tablet by mouth Every Night.     Dispense:  90 tablet     Refill:  0   • Brexpiprazole (Rexulti) 3 MG tablet     Sig: Take 1 tablet by mouth Daily.     Dispense:  90 tablet     Refill:  0       Return in about 3 months (around 12/12/2022), or if symptoms worsen or fail to improve.         Prognosis: Guarded dependent on medication/follow up and treatment plan compliance.  Functionality: pt showing improvements in important areas of daily functioning.     Short-term goals: Patient will adhere to medication regimen and note continued improvement in symptoms over the next 3 months.   Long-term goals: Patient will be adherent to medication management and psychotherapy with continued improvement in symptoms over the next 6 months          This document has been electronically signed by RUPINDER Gann   September 12, 2022 12:19 EDT    Part of this note may be an electronic transcription/translation of spoken language to printed text using the Dragon Dictation System.

## 2022-11-21 ENCOUNTER — OFFICE VISIT (OUTPATIENT)
Dept: PSYCHIATRY | Facility: CLINIC | Age: 52
End: 2022-11-21

## 2022-11-21 VITALS
BODY MASS INDEX: 32.8 KG/M2 | DIASTOLIC BLOOD PRESSURE: 80 MMHG | SYSTOLIC BLOOD PRESSURE: 118 MMHG | HEIGHT: 74 IN | WEIGHT: 255.6 LBS

## 2022-11-21 DIAGNOSIS — E66.09 CLASS 1 OBESITY DUE TO EXCESS CALORIES WITH SERIOUS COMORBIDITY AND BODY MASS INDEX (BMI) OF 32.0 TO 32.9 IN ADULT: ICD-10-CM

## 2022-11-21 DIAGNOSIS — Z79.899 MEDICATION MANAGEMENT: ICD-10-CM

## 2022-11-21 DIAGNOSIS — F41.1 GENERALIZED ANXIETY DISORDER: ICD-10-CM

## 2022-11-21 DIAGNOSIS — F60.1 SCHIZOID PERSONALITY DISORDER IN ADULT: Primary | ICD-10-CM

## 2022-11-21 DIAGNOSIS — F43.10 PTSD (POST-TRAUMATIC STRESS DISORDER): ICD-10-CM

## 2022-11-21 DIAGNOSIS — G47.09 OTHER INSOMNIA: ICD-10-CM

## 2022-11-21 PROCEDURE — 99214 OFFICE O/P EST MOD 30 MIN: CPT | Performed by: NURSE PRACTITIONER

## 2022-11-21 RX ORDER — HYDROXYZINE HYDROCHLORIDE 10 MG/1
10 TABLET, FILM COATED ORAL 2 TIMES DAILY PRN
Qty: 180 TABLET | Refills: 0 | Status: SHIPPED | OUTPATIENT
Start: 2022-11-21 | End: 2023-02-21 | Stop reason: SDUPTHER

## 2022-11-21 RX ORDER — TRAZODONE HYDROCHLORIDE 150 MG/1
150 TABLET ORAL NIGHTLY
Qty: 90 TABLET | Refills: 0 | Status: SHIPPED | OUTPATIENT
Start: 2022-11-21 | End: 2023-02-21 | Stop reason: SDUPTHER

## 2022-11-21 RX ORDER — FLUOXETINE HYDROCHLORIDE 40 MG/1
80 CAPSULE ORAL DAILY
Qty: 180 CAPSULE | Refills: 0 | Status: SHIPPED | OUTPATIENT
Start: 2022-11-21 | End: 2023-02-21 | Stop reason: SDUPTHER

## 2022-11-21 RX ORDER — BREXPIPRAZOLE 4 MG/1
4 TABLET ORAL DAILY
Qty: 90 TABLET | Refills: 0 | Status: SHIPPED | OUTPATIENT
Start: 2022-11-21 | End: 2023-02-21 | Stop reason: SDUPTHER

## 2022-11-21 NOTE — PROGRESS NOTES
Subjective   Jamarcus Allison is a 52 y.o. male who presents today for follow up    Chief Complaint:  Anxiety    History of Present Illness: Patient presents as follow up. States he has been feeling more depressed recently.  States he has also been having some increase in paranoia along with continuing to see shadows and peripheral vision and hear his name being called.  States he woke up the other night feeling extremely anxious and had a panic attack.  States he has not had any other episodes since then.  Reports appetite is fair, he later discloses that he has been struggling to buy groceries, states he has been primarily eating popcorn.  Discussed with patient multiple food macias in the area, states that he is only allowed to visit 1 place per month.  He is agreeable for referral for case management.  Reports sleep is fair, states his neighbors remain loud which interferes with his sleep.  He denies SI/HI.    The following portions of the patient's history were reviewed and updated as appropriate: allergies, current medications, past family history, past medical history, past social history, past surgical history and problem list.      Past Medical History:  Past Medical History:   Diagnosis Date   • Arthritis    • Colon polyp    • Depression    • Diabetes mellitus (HCC)    • Diabetic neuropathy (HCC)    • Elevated cholesterol    • GERD (gastroesophageal reflux disease)    • Hypertension    • IBS (irritable bowel syndrome)    • Peripheral artery disease (HCC)    • PTSD (post-traumatic stress disorder)    • Rectal bleeding    • Schizoid personality (HCC)        Social History:  Social History     Socioeconomic History   • Marital status: Single   Tobacco Use   • Smoking status: Never   • Smokeless tobacco: Never   Vaping Use   • Vaping Use: Never used   Substance and Sexual Activity   • Alcohol use: Never   • Drug use: Never   • Sexual activity: Defer       Family History:  Family History   Problem Relation Age of  Onset   • Heart disease Mother    • Diabetes Mother    • Cancer Father    • Alcohol abuse Father    • Heart disease Sister    • Diabetes Sister    • Cancer Sister    • Thyroid disease Sister    • Depression Sister    • Anxiety disorder Sister    • Depression Brother    • Anxiety disorder Brother    • Alcohol abuse Brother        Past Surgical History:  Past Surgical History:   Procedure Laterality Date   • CATARACT EXTRACTION     • COLONOSCOPY     • COLONOSCOPY N/A 7/13/2022    Procedure: COLONOSCOPY;  Surgeon: Freddy Mortensen MD;  Location: [facility];  Service: Gastroenterology;  Laterality: N/A;   • EYE SURGERY     • POLYPECTOMY         Problem List:  Patient Active Problem List   Diagnosis   • Type 2 diabetes mellitus with hyperglycemia, without long-term current use of insulin (HCC)   • Screening for colon cancer   • History of colonic polyps   • Rectal bleeding       Allergy:   Allergies   Allergen Reactions   • Poison Ivy Extract Anaphylaxis   • Poison Oak Extract Anaphylaxis   • Sumac Anaphylaxis        Current Medications:   Current Outpatient Medications   Medication Sig Dispense Refill   • atorvastatin (LIPITOR) 40 MG tablet Take 1 tablet by mouth Every Night. 90 tablet 1   • Blood Glucose Monitoring Suppl (B-D LOGIC BLOOD GLUCOSE) w/Device kit 1 Device Daily. 1 each 0   • empagliflozin (Jardiance) 25 MG tablet tablet Take 1 tablet by mouth Daily. 90 tablet 0   • FLUoxetine (PROzac) 40 MG capsule Take 2 capsules by mouth Daily. 180 capsule 0   • glipiZIDE-metFORMIN (METAGLIP) 5-500 MG per tablet Take 1 tablet by mouth 2 (Two) Times a Day Before Meals. 180 tablet 0   • glucose blood test strip Check daily 200 each 12   • Hydrocort-Pramoxine, Perianal, (Proctofoam HC) 1-1 % rectal foam Insert 1 application into the rectum 2 (Two) Times a Day. 1 each 0   • hydrOXYzine (ATARAX) 10 MG tablet Take 1 tablet by mouth 2 (Two) Times a Day As Needed for Anxiety. 180 tablet 0   • Lancets (freestyle) lancets  "Check daily fasting 200 each 12   • lisinopril (PRINIVIL,ZESTRIL) 5 MG tablet Take 1 tablet by mouth Daily. 90 tablet 1   • loratadine (CLARITIN) 10 MG tablet Take 1 tablet by mouth Daily. 90 tablet 0   • pioglitazone (ACTOS) 45 MG tablet Take 1 tablet by mouth Daily. 90 tablet 0   • traZODone (DESYREL) 150 MG tablet Take 1 tablet by mouth Every Night. 90 tablet 0   • Brexpiprazole (Rexulti) 4 MG tablet Take 1 tablet by mouth Daily. 90 tablet 0     No current facility-administered medications for this visit.       Review of Symptoms:    Review of Systems   Constitutional: Positive for fatigue.   HENT: Negative.    Eyes: Negative.    Respiratory: Negative.    Cardiovascular: Negative.    Gastrointestinal: Negative.    Genitourinary: Negative.    Musculoskeletal: Negative.    Skin: Negative.    Neurological: Negative.    Psychiatric/Behavioral: Positive for hallucinations, sleep disturbance, depressed mood and stress. Negative for suicidal ideas. The patient is nervous/anxious.        Objective   Physical Exam:   Blood pressure 118/80, height 188 cm (74\"), weight 116 kg (255 lb 9.6 oz).  Body mass index is 32.82 kg/m².    Appearance: Well nourished male, appropriately dressed, appears stated age and in no acute distress  Gait, Station, Strength: WNL    Mental Status Exam:   Hygiene:   good  Cooperation:  Cooperative  Eye Contact:  Good  Psychomotor Behavior:  Appropriate  Affect:  Blunted  Mood: anxious  Hopelessness: 5  Speech:  Minimal  Thought Process:  Linear  Thought Content:  Mood congruent  Suicidal:  None  Homicidal:  None  Hallucinations:  Auditory and Visual  Delusion:  Paranoid  Memory:  Intact  Orientation:  Person, Place, Time and Situation  Reliability:  good  Insight:  Fair  Judgement:  Fair  Impulse Control:  Fair  Physical/Medical Issues:  Yes See med hx     PHQ-Score Total:  PHQ-9 Total Score: 10   Patient screened positive for depression based on a PHQ-9 score of 10 on 11/21/2022. Follow-up " recommendations include: Prescribed antidepressant medication treatment and Suicide Risk Assessment performed.        Lab Results:   No visits with results within 1 Month(s) from this visit.   Latest known visit with results is:   Office Visit on 08/23/2022   Component Date Value Ref Range Status   • Glucose 08/23/2022 77  65 - 99 mg/dL Final   • BUN 08/23/2022 10  6 - 20 mg/dL Final   • Creatinine 08/23/2022 0.64 (L)  0.76 - 1.27 mg/dL Final   • Sodium 08/23/2022 141  136 - 145 mmol/L Final   • Potassium 08/23/2022 3.7  3.5 - 5.2 mmol/L Final   • Chloride 08/23/2022 105  98 - 107 mmol/L Final   • CO2 08/23/2022 26.9  22.0 - 29.0 mmol/L Final   • Calcium 08/23/2022 9.1  8.6 - 10.5 mg/dL Final   • Total Protein 08/23/2022 6.7  6.0 - 8.5 g/dL Final   • Albumin 08/23/2022 4.00  3.50 - 5.20 g/dL Final   • ALT (SGPT) 08/23/2022 34  1 - 41 U/L Final   • AST (SGOT) 08/23/2022 26  1 - 40 U/L Final   • Alkaline Phosphatase 08/23/2022 90  39 - 117 U/L Final   • Total Bilirubin 08/23/2022 0.5  0.0 - 1.2 mg/dL Final   • Globulin 08/23/2022 2.7  gm/dL Final   • A/G Ratio 08/23/2022 1.5  g/dL Final   • BUN/Creatinine Ratio 08/23/2022 15.6  7.0 - 25.0 Final   • Anion Gap 08/23/2022 9.1  5.0 - 15.0 mmol/L Final   • eGFR 08/23/2022 113.9  >60.0 mL/min/1.73 Final    National Kidney Foundation and American Society of Nephrology (ASN) Task Force recommended calculation based on the Chronic Kidney Disease Epidemiology Collaboration (CKD-EPI) equation refit without adjustment for race.   • Hemoglobin A1C 08/23/2022 7.10 (H)  4.80 - 5.60 % Final   • WBC 08/23/2022 6.23  3.40 - 10.80 10*3/mm3 Final   • RBC 08/23/2022 5.03  4.14 - 5.80 10*6/mm3 Final   • Hemoglobin 08/23/2022 15.1  13.0 - 17.7 g/dL Final   • Hematocrit 08/23/2022 45.6  37.5 - 51.0 % Final   • MCV 08/23/2022 90.7  79.0 - 97.0 fL Final   • MCH 08/23/2022 30.0  26.6 - 33.0 pg Final   • MCHC 08/23/2022 33.1  31.5 - 35.7 g/dL Final   • RDW 08/23/2022 12.6  12.3 - 15.4 % Final    • RDW-SD 08/23/2022 41.5  37.0 - 54.0 fl Final   • MPV 08/23/2022 13.0 (H)  6.0 - 12.0 fL Final   • Platelets 08/23/2022 223  140 - 450 10*3/mm3 Final   • Neutrophil % 08/23/2022 63.7  42.7 - 76.0 % Final   • Lymphocyte % 08/23/2022 27.9  19.6 - 45.3 % Final   • Monocyte % 08/23/2022 6.3  5.0 - 12.0 % Final   • Eosinophil % 08/23/2022 1.3  0.3 - 6.2 % Final   • Basophil % 08/23/2022 0.5  0.0 - 1.5 % Final   • Immature Grans % 08/23/2022 0.3  0.0 - 0.5 % Final   • Neutrophils, Absolute 08/23/2022 3.97  1.70 - 7.00 10*3/mm3 Final   • Lymphocytes, Absolute 08/23/2022 1.74  0.70 - 3.10 10*3/mm3 Final   • Monocytes, Absolute 08/23/2022 0.39  0.10 - 0.90 10*3/mm3 Final   • Eosinophils, Absolute 08/23/2022 0.08  0.00 - 0.40 10*3/mm3 Final   • Basophils, Absolute 08/23/2022 0.03  0.00 - 0.20 10*3/mm3 Final   • Immature Grans, Absolute 08/23/2022 0.02  0.00 - 0.05 10*3/mm3 Final   • nRBC 08/23/2022 0.0  0.0 - 0.2 /100 WBC Final       Assessment & Plan   Diagnoses and all orders for this visit:    1. Schizoid personality disorder in adult (HCC) (Primary)  -     FLUoxetine (PROzac) 40 MG capsule; Take 2 capsules by mouth Daily.  Dispense: 180 capsule; Refill: 0  -     Brexpiprazole (Rexulti) 4 MG tablet; Take 1 tablet by mouth Daily.  Dispense: 90 tablet; Refill: 0    2. Generalized anxiety disorder  -     FLUoxetine (PROzac) 40 MG capsule; Take 2 capsules by mouth Daily.  Dispense: 180 capsule; Refill: 0  -     hydrOXYzine (ATARAX) 10 MG tablet; Take 1 tablet by mouth 2 (Two) Times a Day As Needed for Anxiety.  Dispense: 180 tablet; Refill: 0  -     traZODone (DESYREL) 150 MG tablet; Take 1 tablet by mouth Every Night.  Dispense: 90 tablet; Refill: 0  -     Brexpiprazole (Rexulti) 4 MG tablet; Take 1 tablet by mouth Daily.  Dispense: 90 tablet; Refill: 0    3. Other insomnia  -     traZODone (DESYREL) 150 MG tablet; Take 1 tablet by mouth Every Night.  Dispense: 90 tablet; Refill: 0    4. Medication management    5. PTSD  (post-traumatic stress disorder)    6. Class 1 obesity due to excess calories with serious comorbidity and body mass index (BMI) of 32.0 to 32.9 in adult        -Increase brexpiprazole 4 mg daily for AVH and paranoia. Lengthy discussion with patient on the possible side effects of antipsychotic medications including increased cholesterol, increased blood sugar, and possibility of weight gain.  Also discussed the need to monitor lab work associated with this. The risk of muscle movement disorders with this class of medication was also discussed.  -Continue trazodone 150 mg nightly for sleep  -Continue fluoxetine 80 mg daily for anxiety and depression  -Continue hydroxyzine 10 mg twice daily as needed for anxiety  -We will place referral for case management and efforts to help patient with difficulty obtaining food  -Encouraged patient to restart psychotherapy  -YADIEL reviewed and appropriate. Patient counseled on use of controlled substances.   -The benefits of a healthy diet and exercise were discussed with patient, especially the positive effects they have on mental health. Patient encouraged to consider lifestyle modification regarding  diet and exercise patterns to maximize results of mental health treatment.  -Reviewed previous available documentation  -Reviewed most recent available labs                  Visit Diagnoses:    ICD-10-CM ICD-9-CM   1. Schizoid personality disorder in adult (HCC)  F60.1 301.20   2. Generalized anxiety disorder  F41.1 300.02   3. Other insomnia  G47.09 780.52   4. Medication management  Z79.899 V58.69   5. PTSD (post-traumatic stress disorder)  F43.10 309.81   6. Class 1 obesity due to excess calories with serious comorbidity and body mass index (BMI) of 32.0 to 32.9 in adult  E66.09 278.00    Z68.32 V85.32         TREATMENT PLAN/GOALS: Continue supportive psychotherapy efforts and medications as indicated. Treatment and medication options discussed during today's visit. Patient  acknowledged and verbally consented to continue with current treatment plan and was educated on the importance of compliance with treatment and follow-up appointments.    MEDICATION ISSUES:    Discussed medication options and treatment plan of prescribed medication as well as the risks, benefits, and side effects including potential falls, possible impaired driving and metabolic adversities among others. Patient is agreeable to call the office with any worsening of symptoms or onset of side effects. Patient is agreeable to call 911 or go to the nearest ER should he/she begin having SI/HI.     MEDS ORDERED DURING VISIT:  New Medications Ordered This Visit   Medications   • FLUoxetine (PROzac) 40 MG capsule     Sig: Take 2 capsules by mouth Daily.     Dispense:  180 capsule     Refill:  0   • hydrOXYzine (ATARAX) 10 MG tablet     Sig: Take 1 tablet by mouth 2 (Two) Times a Day As Needed for Anxiety.     Dispense:  180 tablet     Refill:  0   • traZODone (DESYREL) 150 MG tablet     Sig: Take 1 tablet by mouth Every Night.     Dispense:  90 tablet     Refill:  0   • Brexpiprazole (Rexulti) 4 MG tablet     Sig: Take 1 tablet by mouth Daily.     Dispense:  90 tablet     Refill:  0       Return in about 3 months (around 2/21/2023), or if symptoms worsen or fail to improve.         Prognosis: Guarded dependent on medication/follow up and treatment plan compliance.  Functionality: pt showing improvements in important areas of daily functioning.     Short-term goals: Patient will adhere to medication regimen and note continued improvement in symptoms over the next 3 months.   Long-term goals: Patient will be adherent to medication management and psychotherapy with continued improvement in symptoms over the next 6 months          This document has been electronically signed by RUPINDER Gann   November 21, 2022 14:10 EST    Part of this note may be an electronic transcription/translation of spoken language to  printed text using the Dragon Dictation System.

## 2022-11-23 ENCOUNTER — OFFICE VISIT (OUTPATIENT)
Dept: FAMILY MEDICINE CLINIC | Facility: CLINIC | Age: 52
End: 2022-11-23

## 2022-11-23 VITALS
TEMPERATURE: 97.2 F | WEIGHT: 256.4 LBS | RESPIRATION RATE: 16 BRPM | DIASTOLIC BLOOD PRESSURE: 70 MMHG | OXYGEN SATURATION: 96 % | HEIGHT: 74 IN | SYSTOLIC BLOOD PRESSURE: 124 MMHG | BODY MASS INDEX: 32.91 KG/M2 | HEART RATE: 62 BPM

## 2022-11-23 DIAGNOSIS — E11.65 TYPE 2 DIABETES MELLITUS WITH HYPERGLYCEMIA, WITHOUT LONG-TERM CURRENT USE OF INSULIN: ICD-10-CM

## 2022-11-23 DIAGNOSIS — F41.9 ANXIETY: ICD-10-CM

## 2022-11-23 DIAGNOSIS — R05.1 ACUTE COUGH: ICD-10-CM

## 2022-11-23 DIAGNOSIS — Z00.00 MEDICARE ANNUAL WELLNESS VISIT, SUBSEQUENT: Primary | ICD-10-CM

## 2022-11-23 PROCEDURE — 83036 HEMOGLOBIN GLYCOSYLATED A1C: CPT | Performed by: FAMILY MEDICINE

## 2022-11-23 PROCEDURE — 84443 ASSAY THYROID STIM HORMONE: CPT | Performed by: FAMILY MEDICINE

## 2022-11-23 PROCEDURE — G0439 PPPS, SUBSEQ VISIT: HCPCS | Performed by: FAMILY MEDICINE

## 2022-11-23 PROCEDURE — 85027 COMPLETE CBC AUTOMATED: CPT | Performed by: FAMILY MEDICINE

## 2022-11-23 PROCEDURE — 1170F FXNL STATUS ASSESSED: CPT | Performed by: FAMILY MEDICINE

## 2022-11-23 PROCEDURE — 1126F AMNT PAIN NOTED NONE PRSNT: CPT | Performed by: FAMILY MEDICINE

## 2022-11-23 PROCEDURE — 1159F MED LIST DOCD IN RCRD: CPT | Performed by: FAMILY MEDICINE

## 2022-11-23 PROCEDURE — 80053 COMPREHEN METABOLIC PANEL: CPT | Performed by: FAMILY MEDICINE

## 2022-11-23 PROCEDURE — 80061 LIPID PANEL: CPT | Performed by: FAMILY MEDICINE

## 2022-11-23 RX ORDER — PIOGLITAZONEHYDROCHLORIDE 45 MG/1
45 TABLET ORAL DAILY
Qty: 90 TABLET | Refills: 0 | Status: SHIPPED | OUTPATIENT
Start: 2022-11-23 | End: 2023-02-23 | Stop reason: SDUPTHER

## 2022-11-23 RX ORDER — GLIPIZIDE AND METFORMIN HCL 5; 500 MG/1; MG/1
1 TABLET, FILM COATED ORAL
Qty: 180 TABLET | Refills: 0 | Status: SHIPPED | OUTPATIENT
Start: 2022-11-23 | End: 2023-02-23 | Stop reason: SDUPTHER

## 2022-11-23 RX ORDER — LISINOPRIL 5 MG/1
5 TABLET ORAL DAILY
Qty: 90 TABLET | Refills: 1 | Status: SHIPPED | OUTPATIENT
Start: 2022-11-23 | End: 2023-02-23 | Stop reason: SDUPTHER

## 2022-11-23 RX ORDER — ATORVASTATIN CALCIUM 40 MG/1
40 TABLET, FILM COATED ORAL NIGHTLY
Qty: 90 TABLET | Refills: 1 | Status: SHIPPED | OUTPATIENT
Start: 2022-11-23 | End: 2023-02-23 | Stop reason: SDUPTHER

## 2022-11-23 NOTE — PROGRESS NOTES
QUICK REFERENCE INFORMATION:  The ABCs of the Annual Wellness Visit    Subsequent Medicare Wellness Visit    HEALTH RISK ASSESSMENT    1970    Recent Hospitalizations:  No hospitalization(s) within the last year..        Current Medical Providers:  Patient Care Team:  Everardo Venegas DO as PCP - General (Family Medicine)        Smoking Status:  Social History     Tobacco Use   Smoking Status Never   Smokeless Tobacco Never       Alcohol Consumption:  Social History     Substance and Sexual Activity   Alcohol Use Never       Depression Screen:   PHQ-2/PHQ-9 Depression Screening 11/23/2022   Retired PHQ-9 Total Score -   Retired Total Score -   Little Interest or Pleasure in Doing Things 0-->not at all   Feeling Down, Depressed or Hopeless 1-->several days   Trouble Falling or Staying Asleep, or Sleeping Too Much -   Feeling Tired or Having Little Energy -   Poor Appetite or Overeating -   Feeling Bad about Yourself - or that You are a Failure or Have Let Yourself or Your Family Down -   Trouble Concentrating on Things, Such as Reading the Newspaper or Watching Television -   Moving or Speaking So Slowly that Other People Could Have Noticed? Or the Opposite - Being So Fidgety -   Thoughts that You Would be Better Off Dead or of Hurting Yourself in Some Way -   PHQ-9: Brief Depression Severity Measure Score 1   If You Checked Off Any Problems, How Difficult Have These Problems Made It For You to Do Your Work, Take Care of Things at Home, or Get Along with Other People? -       Health Habits and Functional and Cognitive Screening:  Functional & Cognitive Status 11/23/2022   Do you have difficulty preparing food and eating? Yes   Do you have difficulty bathing yourself, getting dressed or grooming yourself? No   Do you have difficulty using the toilet? No   Do you have difficulty moving around from place to place? No   Do you have trouble with steps or getting out of a bed or a chair? Yes   Current Diet Well  Balanced Diet   Dental Exam Not up to date   Eye Exam Not up to date   Exercise (times per week) 7 times per week   Current Exercises Include Walking   Do you need help using the phone?  No   Are you deaf or do you have serious difficulty hearing?  No   Do you need help with transportation? No   Do you need help shopping? No   Do you need help preparing meals?  No   Do you need help with housework?  No   Do you need help with laundry? No   Do you need help taking your medications? No   Do you need help managing money? No   Do you ever drive or ride in a car without wearing a seat belt? No           Does the patient have evidence of cognitive impairment? No    Aspirin use counseling: Taking ASA appropriately as indicated      Recent Lab Results:  CMP:  Lab Results   Component Value Date    BUN 10 08/23/2022    CREATININE 0.64 (L) 08/23/2022    EGFRIFNONA 119 11/23/2021    BCR 15.6 08/23/2022     08/23/2022    K 3.7 08/23/2022    CO2 26.9 08/23/2022    CALCIUM 9.1 08/23/2022    ALBUMIN 4.00 08/23/2022    LABIL2 1.5 08/21/2015    BILITOT 0.5 08/23/2022    ALKPHOS 90 08/23/2022    AST 26 08/23/2022    ALT 34 08/23/2022     Lipid Panel:  Lab Results   Component Value Date    CHOL 143 05/23/2022    TRIG 128 05/23/2022    HDL 29 (L) 05/23/2022    VLDL 23 05/23/2022    LDLHDL 3.05 05/23/2022     HbA1c:  Lab Results   Component Value Date    HGBA1C 7.10 (H) 08/23/2022       Visual Acuity:  Vision Screening    Right eye Left eye Both eyes   Without correction 20/70 20/15 20/15   With correction          Age-appropriate Screening Schedule:  Refer to the list below for future screening recommendations based on patient's age, sex and/or medical conditions. Orders for these recommended tests are listed in the plan section. The patient has been provided with a written plan.    Health Maintenance   Topic Date Due   • TDAP/TD VACCINES (1 - Tdap) Never done   • URINE MICROALBUMIN  08/21/2016   • ZOSTER VACCINE (1 of 2) Never  done   • DIABETIC FOOT EXAM  06/24/2022   • INFLUENZA VACCINE  08/01/2022   • DIABETIC EYE EXAM  11/09/2022   • HEMOGLOBIN A1C  02/23/2023   • LIPID PANEL  05/23/2023        Subjective   History of Present Illness    Jamarcus Allison is a 52 y.o. male who presents for an Subsequent Wellness Visit.    The following portions of the patient's history were reviewed and updated as appropriate: allergies, current medications, past family history, past medical history, past social history, past surgical history and problem list.    Outpatient Medications Prior to Visit   Medication Sig Dispense Refill   • Blood Glucose Monitoring Suppl (B-D LOGIC BLOOD GLUCOSE) w/Device kit 1 Device Daily. 1 each 0   • Brexpiprazole (Rexulti) 4 MG tablet Take 1 tablet by mouth Daily. 90 tablet 0   • FLUoxetine (PROzac) 40 MG capsule Take 2 capsules by mouth Daily. 180 capsule 0   • glucose blood test strip Check daily 200 each 12   • Hydrocort-Pramoxine, Perianal, (Proctofoam HC) 1-1 % rectal foam Insert 1 application into the rectum 2 (Two) Times a Day. 1 each 0   • hydrOXYzine (ATARAX) 10 MG tablet Take 1 tablet by mouth 2 (Two) Times a Day As Needed for Anxiety. 180 tablet 0   • Lancets (freestyle) lancets Check daily fasting 200 each 12   • loratadine (CLARITIN) 10 MG tablet Take 1 tablet by mouth Daily. 90 tablet 0   • traZODone (DESYREL) 150 MG tablet Take 1 tablet by mouth Every Night. 90 tablet 0   • atorvastatin (LIPITOR) 40 MG tablet Take 1 tablet by mouth Every Night. 90 tablet 1   • empagliflozin (Jardiance) 25 MG tablet tablet Take 1 tablet by mouth Daily. 90 tablet 0   • glipiZIDE-metFORMIN (METAGLIP) 5-500 MG per tablet Take 1 tablet by mouth 2 (Two) Times a Day Before Meals. 180 tablet 0   • lisinopril (PRINIVIL,ZESTRIL) 5 MG tablet Take 1 tablet by mouth Daily. 90 tablet 1   • pioglitazone (ACTOS) 45 MG tablet Take 1 tablet by mouth Daily. 90 tablet 0     No facility-administered medications prior to visit.       Patient  Active Problem List   Diagnosis   • Type 2 diabetes mellitus with hyperglycemia, without long-term current use of insulin (HCC)   • Screening for colon cancer   • History of colonic polyps   • Rectal bleeding       Advance Care Planning:  ACP discussion was held with the patient during this visit. Patient does not have an advance directive, information provided.    Identification of Risk Factors:  Risk factors include: Advance Directive Discussion.    Review of Systems   Constitutional: Negative for chills, fever and unexpected weight loss.   HENT: Negative for congestion and sore throat.    Eyes: Negative for blurred vision and visual disturbance.   Respiratory: Negative for cough and wheezing.    Cardiovascular: Negative for chest pain and palpitations.   Gastrointestinal: Negative for abdominal pain and diarrhea.   Endocrine: Negative for cold intolerance and heat intolerance.   Genitourinary: Negative for dysuria.   Musculoskeletal: Negative for arthralgias and neck stiffness.   Neurological: Negative for dizziness, seizures and syncope.   Psychiatric/Behavioral: Negative for self-injury, suicidal ideas and depressed mood.       Compared to one year ago, the patient feels his physical health is the same.  Compared to one year ago, the patient feels his mental health is the same.    Objective     Physical Exam  Vitals and nursing note reviewed.   Constitutional:       Appearance: He is well-developed.   HENT:      Head: Normocephalic and atraumatic.      Right Ear: External ear normal.      Left Ear: External ear normal.      Nose: Nose normal.   Eyes:      Conjunctiva/sclera: Conjunctivae normal.      Pupils: Pupils are equal, round, and reactive to light.   Cardiovascular:      Rate and Rhythm: Normal rate and regular rhythm.      Heart sounds: Normal heart sounds.   Pulmonary:      Effort: Pulmonary effort is normal.      Breath sounds: Normal breath sounds.   Abdominal:      General: Bowel sounds are normal.  "     Palpations: Abdomen is soft.   Musculoskeletal:      Cervical back: Normal range of motion and neck supple.   Skin:     General: Skin is warm and dry.   Neurological:      Mental Status: He is alert and oriented to person, place, and time.   Psychiatric:         Behavior: Behavior normal.         Vitals:    11/23/22 0921   BP: 124/70   BP Location: Right arm   Patient Position: Sitting   Cuff Size: Large Adult   Pulse: 62   Resp: 16   Temp: 97.2 °F (36.2 °C)   TempSrc: Temporal   SpO2: 96%   Weight: 116 kg (256 lb 6.4 oz)   Height: 188 cm (74\")   PainSc:   2   PainLoc: Abdomen       BMI is >= 30 and <35. (Class 1 Obesity). The following options were offered after discussion;: exercise counseling/recommendations and nutrition counseling/recommendations      Assessment & Plan   Patient Self-Management and Personalized Health Advice  The patient has been provided with information about: diet and exercise and preventive services including:   · Annual Wellness Visit (AWV).    Visit Diagnoses:    ICD-10-CM ICD-9-CM   1. Medicare annual wellness visit, subsequent  Z00.00 V70.0   2. Type 2 diabetes mellitus with hyperglycemia, without long-term current use of insulin (HCC)  E11.65 250.00     790.29   3. Anxiety  F41.9 300.00   4. Acute cough  R05.1 786.2   #1 patient is here for Medicare wellness.  No concerns reported.  However, last time he saw mental health, there was concerns that prompted a referral to case management.  He has not heard about this referral yet  #2 following up on type 2 diabetes.  He is agreeable to blood work.  He has been stable on his current regimen.  He has lost 5 pounds since September.  #3 he follows with mental health for anxiety.  He reports that his anxiety has been a little worse recently.  We will get TSH  #4 he complains of cough over the past 3 days since going outside in the cold air.  Exam is normal today.  No concerns.  He was not coughing while here.  #5 advance directives " discussed today.  He intends on making his Sister Estela TELLEZ.  He was given the paperwork that he needs today.      Orders Placed This Encounter   Procedures   • Comprehensive metabolic panel     Standing Status:   Future     Standing Expiration Date:   11/23/2023     Order Specific Question:   Release to patient     Answer:   Routine Release   • Hemoglobin A1c     Standing Status:   Future     Standing Expiration Date:   11/23/2023     Order Specific Question:   Release to patient     Answer:   Routine Release   • Lipid panel     Standing Status:   Future     Standing Expiration Date:   11/23/2023     Order Specific Question:   Release to patient     Answer:   Routine Release   • CBC No Differential     Standing Status:   Future     Standing Expiration Date:   11/23/2023     Order Specific Question:   Release to patient     Answer:   Routine Release   • TSH     Standing Status:   Future     Standing Expiration Date:   11/23/2023     Order Specific Question:   Release to patient     Answer:   Routine Release             Outpatient Encounter Medications as of 11/23/2022   Medication Sig Dispense Refill   • Blood Glucose Monitoring Suppl (B-D LOGIC BLOOD GLUCOSE) w/Device kit 1 Device Daily. 1 each 0   • Brexpiprazole (Rexulti) 4 MG tablet Take 1 tablet by mouth Daily. 90 tablet 0   • FLUoxetine (PROzac) 40 MG capsule Take 2 capsules by mouth Daily. 180 capsule 0   • glucose blood test strip Check daily 200 each 12   • Hydrocort-Pramoxine, Perianal, (Proctofoam HC) 1-1 % rectal foam Insert 1 application into the rectum 2 (Two) Times a Day. 1 each 0   • hydrOXYzine (ATARAX) 10 MG tablet Take 1 tablet by mouth 2 (Two) Times a Day As Needed for Anxiety. 180 tablet 0   • Lancets (freestyle) lancets Check daily fasting 200 each 12   • loratadine (CLARITIN) 10 MG tablet Take 1 tablet by mouth Daily. 90 tablet 0   • traZODone (DESYREL) 150 MG tablet Take 1 tablet by mouth Every Night. 90 tablet 0   • [DISCONTINUED]  atorvastatin (LIPITOR) 40 MG tablet Take 1 tablet by mouth Every Night. 90 tablet 1   • [DISCONTINUED] empagliflozin (Jardiance) 25 MG tablet tablet Take 1 tablet by mouth Daily. 90 tablet 0   • [DISCONTINUED] glipiZIDE-metFORMIN (METAGLIP) 5-500 MG per tablet Take 1 tablet by mouth 2 (Two) Times a Day Before Meals. 180 tablet 0   • [DISCONTINUED] lisinopril (PRINIVIL,ZESTRIL) 5 MG tablet Take 1 tablet by mouth Daily. 90 tablet 1   • [DISCONTINUED] pioglitazone (ACTOS) 45 MG tablet Take 1 tablet by mouth Daily. 90 tablet 0   • atorvastatin (LIPITOR) 40 MG tablet Take 1 tablet by mouth Every Night. 90 tablet 1   • empagliflozin (Jardiance) 25 MG tablet tablet Take 1 tablet by mouth Daily. 90 tablet 0   • glipiZIDE-metFORMIN (METAGLIP) 5-500 MG per tablet Take 1 tablet by mouth 2 (Two) Times a Day Before Meals. 180 tablet 0   • lisinopril (PRINIVIL,ZESTRIL) 5 MG tablet Take 1 tablet by mouth Daily. 90 tablet 1   • pioglitazone (ACTOS) 45 MG tablet Take 1 tablet by mouth Daily. 90 tablet 0     No facility-administered encounter medications on file as of 11/23/2022.       Reviewed use of high risk medication in the elderly: yes  Reviewed for potential of harmful drug interactions in the elderly: yes    Follow Up:  Return in about 3 months (around 2/23/2023) for Recheck.     An After Visit Summary and PPPS with all of these plans were given to the patient.

## 2022-11-24 LAB
ALBUMIN SERPL-MCNC: 4.3 G/DL (ref 3.5–5.2)
ALBUMIN/GLOB SERPL: 1.5 G/DL
ALP SERPL-CCNC: 82 U/L (ref 39–117)
ALT SERPL W P-5'-P-CCNC: 41 U/L (ref 1–41)
ANION GAP SERPL CALCULATED.3IONS-SCNC: 13 MMOL/L (ref 5–15)
AST SERPL-CCNC: 35 U/L (ref 1–40)
BILIRUB SERPL-MCNC: 0.5 MG/DL (ref 0–1.2)
BUN SERPL-MCNC: 10 MG/DL (ref 6–20)
BUN/CREAT SERPL: 14.3 (ref 7–25)
CALCIUM SPEC-SCNC: 9.6 MG/DL (ref 8.6–10.5)
CHLORIDE SERPL-SCNC: 100 MMOL/L (ref 98–107)
CHOLEST SERPL-MCNC: 267 MG/DL (ref 0–200)
CO2 SERPL-SCNC: 24 MMOL/L (ref 22–29)
CREAT SERPL-MCNC: 0.7 MG/DL (ref 0.76–1.27)
DEPRECATED RDW RBC AUTO: 41.5 FL (ref 37–54)
EGFRCR SERPLBLD CKD-EPI 2021: 110.9 ML/MIN/1.73
ERYTHROCYTE [DISTWIDTH] IN BLOOD BY AUTOMATED COUNT: 13 % (ref 12.3–15.4)
GLOBULIN UR ELPH-MCNC: 2.9 GM/DL
GLUCOSE SERPL-MCNC: 58 MG/DL (ref 65–99)
HBA1C MFR BLD: 6.9 % (ref 4.8–5.6)
HCT VFR BLD AUTO: 46.2 % (ref 37.5–51)
HDLC SERPL-MCNC: 30 MG/DL (ref 40–60)
HGB BLD-MCNC: 15.6 G/DL (ref 13–17.7)
LDLC SERPL CALC-MCNC: 199 MG/DL (ref 0–100)
LDLC/HDLC SERPL: 6.59 {RATIO}
MCH RBC QN AUTO: 29.3 PG (ref 26.6–33)
MCHC RBC AUTO-ENTMCNC: 33.8 G/DL (ref 31.5–35.7)
MCV RBC AUTO: 86.8 FL (ref 79–97)
PLATELET # BLD AUTO: 282 10*3/MM3 (ref 140–450)
PMV BLD AUTO: 11.8 FL (ref 6–12)
POTASSIUM SERPL-SCNC: 3.6 MMOL/L (ref 3.5–5.2)
PROT SERPL-MCNC: 7.2 G/DL (ref 6–8.5)
RBC # BLD AUTO: 5.32 10*6/MM3 (ref 4.14–5.8)
SODIUM SERPL-SCNC: 137 MMOL/L (ref 136–145)
TRIGL SERPL-MCNC: 196 MG/DL (ref 0–150)
TSH SERPL DL<=0.05 MIU/L-ACNC: 1.27 UIU/ML (ref 0.27–4.2)
VLDLC SERPL-MCNC: 38 MG/DL (ref 5–40)
WBC NRBC COR # BLD: 7.21 10*3/MM3 (ref 3.4–10.8)

## 2022-11-29 ENCOUNTER — TELEPHONE (OUTPATIENT)
Dept: FAMILY MEDICINE CLINIC | Facility: CLINIC | Age: 52
End: 2022-11-29

## 2022-11-29 NOTE — TELEPHONE ENCOUNTER
----- Message from Everardo Venegas DO sent at 11/29/2022  2:26 PM EST -----  Blood work looks good.  Hemoglobin A1c is back to the goal of less than 7.  Well-done.  Keep up the good work.        Patient notified & verbalized understanding.

## 2023-02-21 ENCOUNTER — OFFICE VISIT (OUTPATIENT)
Dept: PSYCHIATRY | Facility: CLINIC | Age: 53
End: 2023-02-21
Payer: MEDICARE

## 2023-02-21 VITALS
SYSTOLIC BLOOD PRESSURE: 120 MMHG | HEIGHT: 74 IN | BODY MASS INDEX: 33.8 KG/M2 | HEART RATE: 87 BPM | DIASTOLIC BLOOD PRESSURE: 80 MMHG | WEIGHT: 263.4 LBS

## 2023-02-21 DIAGNOSIS — G47.09 OTHER INSOMNIA: ICD-10-CM

## 2023-02-21 DIAGNOSIS — F60.1 SCHIZOID PERSONALITY DISORDER IN ADULT: ICD-10-CM

## 2023-02-21 DIAGNOSIS — F41.1 GENERALIZED ANXIETY DISORDER: ICD-10-CM

## 2023-02-21 PROCEDURE — 99214 OFFICE O/P EST MOD 30 MIN: CPT | Performed by: NURSE PRACTITIONER

## 2023-02-21 RX ORDER — TRAZODONE HYDROCHLORIDE 150 MG/1
150 TABLET ORAL NIGHTLY
Qty: 90 TABLET | Refills: 0 | Status: SHIPPED | OUTPATIENT
Start: 2023-02-21 | End: 2023-02-23 | Stop reason: SDUPTHER

## 2023-02-21 RX ORDER — FLUOXETINE HYDROCHLORIDE 40 MG/1
80 CAPSULE ORAL DAILY
Qty: 180 CAPSULE | Refills: 0 | Status: SHIPPED | OUTPATIENT
Start: 2023-02-21 | End: 2023-03-28 | Stop reason: SDUPTHER

## 2023-02-21 RX ORDER — BREXPIPRAZOLE 4 MG/1
4 TABLET ORAL DAILY
Qty: 90 TABLET | Refills: 0 | Status: SHIPPED | OUTPATIENT
Start: 2023-02-21 | End: 2023-03-28 | Stop reason: SDUPTHER

## 2023-02-21 RX ORDER — HYDROXYZINE HYDROCHLORIDE 10 MG/1
10 TABLET, FILM COATED ORAL 2 TIMES DAILY PRN
Qty: 180 TABLET | Refills: 0 | Status: SHIPPED | OUTPATIENT
Start: 2023-02-21 | End: 2023-03-28 | Stop reason: SDUPTHER

## 2023-02-21 NOTE — PROGRESS NOTES
Subjective   Jamarcus Allison is a 53 y.o. male who presents today for follow up    Chief Complaint:  Anxiety, depression    History of Present Illness: Patient presents as follow up. States he had been feeling a little more down recently, denies any stressors. States anxiety has been manageable and medications are working well. States he was contacted by case management but they were unable to help him. States his food situation is slightly better as he has been going to a different food bank that gives him more food. He reports appetite is good, he denies any occurrences that he is hungry without food. States he does worry at times due to cost of living compared to his monthly income. He reports sleep is good. States he continues to hear whispers but they have decreased in frequency. He denies any paranoia. He denies SI/HI.    The following portions of the patient's history were reviewed and updated as appropriate: allergies, current medications, past family history, past medical history, past social history, past surgical history and problem list.      Past Medical History:  Past Medical History:   Diagnosis Date   • Arthritis    • Chronic pain disorder    • Colon polyp    • Depression    • Diabetes mellitus (HCC)    • Diabetic neuropathy (HCC)    • Elevated cholesterol    • GERD (gastroesophageal reflux disease)    • Hypertension    • IBS (irritable bowel syndrome)    • Obsessive-compulsive disorder    • Peripheral artery disease (HCC)    • Peripheral neuropathy    • Psychiatric illness    • Psychosis (HCC)    • PTSD (post-traumatic stress disorder)    • Rectal bleeding    • Schizoaffective disorder (HCC)    • Schizoid personality (HCC)        Social History:  Social History     Socioeconomic History   • Marital status: Single   Tobacco Use   • Smoking status: Never   • Smokeless tobacco: Never   Vaping Use   • Vaping Use: Never used   Substance and Sexual Activity   • Alcohol use: Never   • Drug use: Never   •  Sexual activity: Not Currently     Partners: Female     Birth control/protection: Condom       Family History:  Family History   Problem Relation Age of Onset   • Heart disease Mother    • Diabetes Mother    • Cancer Father    • Alcohol abuse Father    • Heart disease Sister    • Diabetes Sister    • Cancer Sister    • Thyroid disease Sister    • Depression Sister    • Anxiety disorder Sister    • ADD / ADHD Sister    • Depression Brother    • Anxiety disorder Brother    • Alcohol abuse Brother    • Drug abuse Brother    • Paranoid behavior Brother        Past Surgical History:  Past Surgical History:   Procedure Laterality Date   • CATARACT EXTRACTION     • COLONOSCOPY     • COLONOSCOPY N/A 07/13/2022    Procedure: COLONOSCOPY;  Surgeon: Freddy Mortensen MD;  Location: [facility];  Service: Gastroenterology;  Laterality: N/A;   • EYE SURGERY     • POLYPECTOMY     • VASECTOMY         Problem List:  Patient Active Problem List   Diagnosis   • Type 2 diabetes mellitus with hyperglycemia, without long-term current use of insulin (HCC)   • Screening for colon cancer   • History of colonic polyps   • Rectal bleeding       Allergy:   Allergies   Allergen Reactions   • Poison Ivy Extract Anaphylaxis   • Poison Oak Extract Anaphylaxis   • Sumac Anaphylaxis        Current Medications:   Current Outpatient Medications   Medication Sig Dispense Refill   • atorvastatin (LIPITOR) 40 MG tablet Take 1 tablet by mouth Every Night. 90 tablet 1   • Blood Glucose Monitoring Suppl (B-D LOGIC BLOOD GLUCOSE) w/Device kit 1 Device Daily. 1 each 0   • Brexpiprazole (Rexulti) 4 MG tablet Take 1 tablet by mouth Daily. 90 tablet 0   • empagliflozin (Jardiance) 25 MG tablet tablet Take 1 tablet by mouth Daily. 90 tablet 0   • FLUoxetine (PROzac) 40 MG capsule Take 2 capsules by mouth Daily. 180 capsule 0   • glipiZIDE-metFORMIN (METAGLIP) 5-500 MG per tablet Take 1 tablet by mouth 2 (Two) Times a Day Before Meals. 180 tablet 0   • glucose  "blood test strip Check daily 200 each 12   • Hydrocort-Pramoxine, Perianal, (Proctofoam HC) 1-1 % rectal foam Insert 1 application into the rectum 2 (Two) Times a Day. 1 each 0   • hydrOXYzine (ATARAX) 10 MG tablet Take 1 tablet by mouth 2 (Two) Times a Day As Needed for Anxiety. 180 tablet 0   • Lancets (freestyle) lancets Check daily fasting 200 each 12   • lisinopril (PRINIVIL,ZESTRIL) 5 MG tablet Take 1 tablet by mouth Daily. 90 tablet 1   • loratadine (CLARITIN) 10 MG tablet Take 1 tablet by mouth Daily. 90 tablet 0   • pioglitazone (ACTOS) 45 MG tablet Take 1 tablet by mouth Daily. 90 tablet 0   • traZODone (DESYREL) 150 MG tablet Take 1 tablet by mouth Every Night. 90 tablet 0     No current facility-administered medications for this visit.       Review of Symptoms:    Review of Systems   Constitutional: Positive for fatigue.   HENT: Negative.    Eyes: Negative.    Respiratory: Negative.    Cardiovascular: Negative.    Gastrointestinal: Negative.    Genitourinary: Negative.    Musculoskeletal: Positive for arthralgias.   Neurological: Negative.    Psychiatric/Behavioral: Positive for hallucinations, sleep disturbance and depressed mood. Negative for suicidal ideas. The patient is nervous/anxious.        Objective   Physical Exam:   Blood pressure 120/80, pulse 87, height 188 cm (74.02\"), weight 119 kg (263 lb 6.4 oz).  Body mass index is 33.8 kg/m².    Appearance: Well nourished male, appropriately dressed, appears stated age and in no acute distress  Gait, Station, Strength: WNL    Mental Status Exam:   Hygiene:   good  Cooperation:  Cooperative  Eye Contact:  Good  Psychomotor Behavior:  Appropriate  Affect:  Blunted  Mood: depressed and anxious  Hopelessness: 5  Speech:  Minimal  Thought Process:  Linear  Thought Content:  Mood congruent  Suicidal:  None  Homicidal:  None  Hallucinations:  Auditory  Delusion:  None  Memory:  Intact  Orientation:  Person, Place, Time and Situation  Reliability:  " good  Insight:  Fair  Judgement:  Fair  Impulse Control:  Good  Physical/Medical Issues:  see med hx     PHQ-Score Total:  PHQ-9 Total Score: 5   Patient screened positive for depression based on a PHQ-9 score of 5 on 2/21/2023. Follow-up recommendations include: Prescribed antidepressant medication treatment and Suicide Risk Assessment performed.        Lab Results:   No visits with results within 1 Month(s) from this visit.   Latest known visit with results is:   Office Visit on 11/23/2022   Component Date Value Ref Range Status   • Glucose 11/23/2022 58 (L)  65 - 99 mg/dL Final   • BUN 11/23/2022 10  6 - 20 mg/dL Final   • Creatinine 11/23/2022 0.70 (L)  0.76 - 1.27 mg/dL Final   • Sodium 11/23/2022 137  136 - 145 mmol/L Final   • Potassium 11/23/2022 3.6  3.5 - 5.2 mmol/L Final   • Chloride 11/23/2022 100  98 - 107 mmol/L Final   • CO2 11/23/2022 24.0  22.0 - 29.0 mmol/L Final   • Calcium 11/23/2022 9.6  8.6 - 10.5 mg/dL Final   • Total Protein 11/23/2022 7.2  6.0 - 8.5 g/dL Final   • Albumin 11/23/2022 4.30  3.50 - 5.20 g/dL Final   • ALT (SGPT) 11/23/2022 41  1 - 41 U/L Final   • AST (SGOT) 11/23/2022 35  1 - 40 U/L Final   • Alkaline Phosphatase 11/23/2022 82  39 - 117 U/L Final   • Total Bilirubin 11/23/2022 0.5  0.0 - 1.2 mg/dL Final   • Globulin 11/23/2022 2.9  gm/dL Final   • A/G Ratio 11/23/2022 1.5  g/dL Final   • BUN/Creatinine Ratio 11/23/2022 14.3  7.0 - 25.0 Final   • Anion Gap 11/23/2022 13.0  5.0 - 15.0 mmol/L Final   • eGFR 11/23/2022 110.9  >60.0 mL/min/1.73 Final    National Kidney Foundation and American Society of Nephrology (ASN) Task Force recommended calculation based on the Chronic Kidney Disease Epidemiology Collaboration (CKD-EPI) equation refit without adjustment for race.   • Hemoglobin A1C 11/23/2022 6.90 (H)  4.80 - 5.60 % Final   • Total Cholesterol 11/23/2022 267 (H)  0 - 200 mg/dL Final   • Triglycerides 11/23/2022 196 (H)  0 - 150 mg/dL Final   • HDL Cholesterol 11/23/2022 30  (L)  40 - 60 mg/dL Final   • LDL Cholesterol  11/23/2022 199 (H)  0 - 100 mg/dL Final   • VLDL Cholesterol 11/23/2022 38  5 - 40 mg/dL Final   • LDL/HDL Ratio 11/23/2022 6.59   Final   • WBC 11/23/2022 7.21  3.40 - 10.80 10*3/mm3 Final   • RBC 11/23/2022 5.32  4.14 - 5.80 10*6/mm3 Final   • Hemoglobin 11/23/2022 15.6  13.0 - 17.7 g/dL Final   • Hematocrit 11/23/2022 46.2  37.5 - 51.0 % Final   • MCV 11/23/2022 86.8  79.0 - 97.0 fL Final   • MCH 11/23/2022 29.3  26.6 - 33.0 pg Final   • MCHC 11/23/2022 33.8  31.5 - 35.7 g/dL Final   • RDW 11/23/2022 13.0  12.3 - 15.4 % Final   • RDW-SD 11/23/2022 41.5  37.0 - 54.0 fl Final   • MPV 11/23/2022 11.8  6.0 - 12.0 fL Final   • Platelets 11/23/2022 282  140 - 450 10*3/mm3 Final   • TSH 11/23/2022 1.270  0.270 - 4.200 uIU/mL Final       Assessment & Plan   Diagnoses and all orders for this visit:    1. Generalized anxiety disorder  -     Brexpiprazole (Rexulti) 4 MG tablet; Take 1 tablet by mouth Daily.  Dispense: 90 tablet; Refill: 0  -     FLUoxetine (PROzac) 40 MG capsule; Take 2 capsules by mouth Daily.  Dispense: 180 capsule; Refill: 0  -     hydrOXYzine (ATARAX) 10 MG tablet; Take 1 tablet by mouth 2 (Two) Times a Day As Needed for Anxiety.  Dispense: 180 tablet; Refill: 0  -     traZODone (DESYREL) 150 MG tablet; Take 1 tablet by mouth Every Night.  Dispense: 90 tablet; Refill: 0    2. Schizoid personality disorder in adult (HCC)  -     Brexpiprazole (Rexulti) 4 MG tablet; Take 1 tablet by mouth Daily.  Dispense: 90 tablet; Refill: 0  -     FLUoxetine (PROzac) 40 MG capsule; Take 2 capsules by mouth Daily.  Dispense: 180 capsule; Refill: 0    3. Other insomnia  -     traZODone (DESYREL) 150 MG tablet; Take 1 tablet by mouth Every Night.  Dispense: 90 tablet; Refill: 0        -Continue brexpiprazole 4 mg daily for AVH and paranoia. Lengthy discussion with patient on the possible side effects of antipsychotic medications including increased cholesterol, increased blood  sugar, and possibility of weight gain.  Also discussed the need to monitor lab work associated with this. The risk of muscle movement disorders with this class of medication was also discussed.  -Continue trazodone 150 mg nightly for sleep  -Continue fluoxetine 80 mg daily for anxiety and depression  -Continue hydroxyzine 10 mg twice daily as needed for anxiety  -Provided patient with contact information of additional benefits as well as information for applying for snap benefits  -Encouraged patient to begin therapy  -YADIEL reviewed and appropriate. Patient counseled on use of controlled substances.   -The benefits of a healthy diet and exercise were discussed with patient, especially the positive effects they have on mental health. Patient encouraged to consider lifestyle modification regarding  diet and exercise patterns to maximize results of mental health treatment.  -Reviewed previous available documentation  -Reviewed most recent available labs                Visit Diagnoses:    ICD-10-CM ICD-9-CM   1. Generalized anxiety disorder  F41.1 300.02   2. Schizoid personality disorder in adult (HCC)  F60.1 301.20   3. Other insomnia  G47.09 780.52         TREATMENT PLAN/GOALS: Continue supportive psychotherapy efforts and medications as indicated. Treatment and medication options discussed during today's visit. Patient acknowledged and verbally consented to continue with current treatment plan and was educated on the importance of compliance with treatment and follow-up appointments.    MEDICATION ISSUES:    Discussed medication options and treatment plan of prescribed medication as well as the risks, benefits, and side effects including potential falls, possible impaired driving and metabolic adversities among others. Patient is agreeable to call the office with any worsening of symptoms or onset of side effects. Patient is agreeable to call 911 or go to the nearest ER should he/she begin having SI/HI.     MEDS  ORDERED DURING VISIT:  New Medications Ordered This Visit   Medications   • Brexpiprazole (Rexulti) 4 MG tablet     Sig: Take 1 tablet by mouth Daily.     Dispense:  90 tablet     Refill:  0   • FLUoxetine (PROzac) 40 MG capsule     Sig: Take 2 capsules by mouth Daily.     Dispense:  180 capsule     Refill:  0   • hydrOXYzine (ATARAX) 10 MG tablet     Sig: Take 1 tablet by mouth 2 (Two) Times a Day As Needed for Anxiety.     Dispense:  180 tablet     Refill:  0   • traZODone (DESYREL) 150 MG tablet     Sig: Take 1 tablet by mouth Every Night.     Dispense:  90 tablet     Refill:  0       Return in about 4 weeks (around 3/21/2023), or if symptoms worsen or fail to improve.         Prognosis: Guarded dependent on medication/follow up and treatment plan compliance.  Functionality: pt showing improvements in important areas of daily functioning.     Short-term goals: Patient will adhere to medication regimen and note continued improvement in symptoms over the next 3 months.   Long-term goals: Patient will be adherent to medication management and psychotherapy with continued improvement in symptoms over the next 6 months          This document has been electronically signed by RUPINDER Gann   February 21, 2023 12:49 EST    Part of this note may be an electronic transcription/translation of spoken language to printed text using the Dragon Dictation System.

## 2023-02-23 ENCOUNTER — OFFICE VISIT (OUTPATIENT)
Dept: FAMILY MEDICINE CLINIC | Facility: CLINIC | Age: 53
End: 2023-02-23
Payer: MEDICARE

## 2023-02-23 VITALS
TEMPERATURE: 97.7 F | HEART RATE: 76 BPM | WEIGHT: 264.2 LBS | SYSTOLIC BLOOD PRESSURE: 126 MMHG | HEIGHT: 74 IN | BODY MASS INDEX: 33.91 KG/M2 | OXYGEN SATURATION: 100 % | DIASTOLIC BLOOD PRESSURE: 68 MMHG

## 2023-02-23 DIAGNOSIS — J30.2 SEASONAL ALLERGIES: ICD-10-CM

## 2023-02-23 DIAGNOSIS — E11.65 TYPE 2 DIABETES MELLITUS WITH HYPERGLYCEMIA, WITHOUT LONG-TERM CURRENT USE OF INSULIN: ICD-10-CM

## 2023-02-23 DIAGNOSIS — M25.552 LEFT HIP PAIN: Primary | ICD-10-CM

## 2023-02-23 DIAGNOSIS — F41.1 GENERALIZED ANXIETY DISORDER: ICD-10-CM

## 2023-02-23 DIAGNOSIS — G47.09 OTHER INSOMNIA: ICD-10-CM

## 2023-02-23 PROCEDURE — 99214 OFFICE O/P EST MOD 30 MIN: CPT | Performed by: FAMILY MEDICINE

## 2023-02-23 RX ORDER — GLIPIZIDE AND METFORMIN HCL 5; 500 MG/1; MG/1
1 TABLET, FILM COATED ORAL
Qty: 180 TABLET | Refills: 0 | Status: SHIPPED | OUTPATIENT
Start: 2023-02-23

## 2023-02-23 RX ORDER — TRAZODONE HYDROCHLORIDE 150 MG/1
150 TABLET ORAL NIGHTLY
Qty: 90 TABLET | Refills: 0 | Status: SHIPPED | OUTPATIENT
Start: 2023-02-23 | End: 2023-03-28 | Stop reason: SDUPTHER

## 2023-02-23 RX ORDER — LORATADINE 10 MG/1
10 TABLET ORAL DAILY
Qty: 90 TABLET | Refills: 0 | Status: SHIPPED | OUTPATIENT
Start: 2023-02-23

## 2023-02-23 RX ORDER — LISINOPRIL 5 MG/1
5 TABLET ORAL DAILY
Qty: 90 TABLET | Refills: 1 | Status: SHIPPED | OUTPATIENT
Start: 2023-02-23

## 2023-02-23 RX ORDER — PIOGLITAZONEHYDROCHLORIDE 45 MG/1
45 TABLET ORAL DAILY
Qty: 90 TABLET | Refills: 0 | Status: SHIPPED | OUTPATIENT
Start: 2023-02-23

## 2023-02-23 RX ORDER — ATORVASTATIN CALCIUM 40 MG/1
40 TABLET, FILM COATED ORAL NIGHTLY
Qty: 90 TABLET | Refills: 1 | Status: SHIPPED | OUTPATIENT
Start: 2023-02-23

## 2023-02-23 NOTE — PROGRESS NOTES
Subjective   Jamarcus Allison is a 53 y.o. male.   Pt presents today with CC of Diabetes      History of Present Illness   History of Present Illness  1.  Patient is a 53-year-old male here to follow-up on diabetes.  He takes atorvastatin, Jardiance, medic lip, lisinopril, Actos.  He does not exercise much due to anxiety, but does try to get out and walk.  He has not been able to walk recently because of left hip pain.  He would like evaluation of this.  He has known osteoarthritis in his knee, per report  #2 he follows with psychiatry   #3 he has seasonal allergies for which he takes loratadine         The following portions of the patient's history were reviewed and updated as appropriate: allergies, current medications, past family history, past medical history, past social history, past surgical history and problem list.    Review of Systems   Constitutional: Negative for chills, fever and unexpected weight loss.   HENT: Negative for congestion and sore throat.    Eyes: Negative for blurred vision and visual disturbance.   Respiratory: Negative for cough and wheezing.    Cardiovascular: Negative for chest pain and palpitations.   Gastrointestinal: Negative for abdominal pain and diarrhea.   Endocrine: Negative for cold intolerance and heat intolerance.   Genitourinary: Negative for dysuria.   Musculoskeletal: Negative for arthralgias and neck stiffness.   Neurological: Negative for dizziness, seizures and syncope.   Psychiatric/Behavioral: Negative for self-injury, suicidal ideas and depressed mood.       Objective   Physical Exam  Vitals and nursing note reviewed.   Constitutional:       Appearance: He is well-developed.   HENT:      Head: Normocephalic and atraumatic.      Right Ear: External ear normal.      Left Ear: External ear normal.      Nose: Nose normal.   Eyes:      Conjunctiva/sclera: Conjunctivae normal.      Pupils: Pupils are equal, round, and reactive to light.   Cardiovascular:      Rate and  Rhythm: Normal rate and regular rhythm.      Heart sounds: Normal heart sounds.   Pulmonary:      Effort: Pulmonary effort is normal.      Breath sounds: Normal breath sounds.   Abdominal:      General: Bowel sounds are normal.      Palpations: Abdomen is soft.   Musculoskeletal:      Cervical back: Normal range of motion and neck supple.      Comments: Positive Marlon on the left with radiation to the groin, bursa not tender, fadir negative   Skin:     General: Skin is warm and dry.   Neurological:      Mental Status: He is alert and oriented to person, place, and time.   Psychiatric:         Behavior: Behavior normal.           Assessment & Plan   Diagnoses and all orders for this visit:    1. Left hip pain (Primary)  -     XR Hip With or Without Pelvis 2 - 3 View Left  We will get x-rays of hip to ensure no severe problem.  If x-ray normal, will recommend regular stretching and walking.  He is not a good candidate for physical therapy due to anxiety.  NSAIDs will be recommended.  He tolerates ibuprofen well.  2. Type 2 diabetes mellitus with hyperglycemia, without long-term current use of insulin (HCC)  -     atorvastatin (LIPITOR) 40 MG tablet; Take 1 tablet by mouth Every Night.  Dispense: 90 tablet; Refill: 1  -     empagliflozin (Jardiance) 25 MG tablet tablet; Take 1 tablet by mouth Daily.  Dispense: 90 tablet; Refill: 0  -     glipiZIDE-metFORMIN (METAGLIP) 5-500 MG per tablet; Take 1 tablet by mouth 2 (Two) Times a Day Before Meals.  Dispense: 180 tablet; Refill: 0  -     lisinopril (PRINIVIL,ZESTRIL) 5 MG tablet; Take 1 tablet by mouth Daily.  Dispense: 90 tablet; Refill: 1  -     pioglitazone (ACTOS) 45 MG tablet; Take 1 tablet by mouth Daily.  Dispense: 90 tablet; Refill: 0  -     Comprehensive metabolic panel; Future  -     Hemoglobin A1c; Future  -     CBC No Differential; Future    3. Generalized anxiety disorder  -     traZODone (DESYREL) 150 MG tablet; Take 1 tablet by mouth Every Night.  Dispense:  90 tablet; Refill: 0    4. Other insomnia  -     traZODone (DESYREL) 150 MG tablet; Take 1 tablet by mouth Every Night.  Dispense: 90 tablet; Refill: 0    5. Seasonal allergies  -     loratadine (CLARITIN) 10 MG tablet; Take 1 tablet by mouth Daily.  Dispense: 90 tablet; Refill: 0               BMI is >= 30 and <35. (Class 1 Obesity). The following options were offered after discussion;: exercise counseling/recommendations and nutrition counseling/recommendations          This document has been electronically signed by Margie Atkins  February 23, 2023 08:11 EST    Dictated Utilizing Dragon Dictation: Part of this note may be an electronic transcription/translation of spoken language to printed text using the Dragon Dictation System.

## 2023-02-27 ENCOUNTER — TELEPHONE (OUTPATIENT)
Dept: FAMILY MEDICINE CLINIC | Facility: CLINIC | Age: 53
End: 2023-02-27
Payer: MEDICARE

## 2023-02-27 NOTE — TELEPHONE ENCOUNTER
----- Message from Everardo Venegas DO sent at 2/27/2023  8:06 AM EST -----  No problems on your hip x-ray.  Recommend ibuprofen 600 mg every 6 hours as needed for hip pain.  Recommend remaining active.  Daily walks.  Recommend follow-up if condition worsens.

## 2023-02-27 NOTE — TELEPHONE ENCOUNTER
----- Message from Everardo Venegas DO sent at 2/27/2023  8:06 AM EST -----  No problems on your hip x-ray.  Recommend ibuprofen 600 mg every 6 hours as needed for hip pain.  Recommend remaining active.  Daily walks.  Recommend follow-up if condition worsens.      Left message for pt to return call.

## 2023-03-15 ENCOUNTER — LAB (OUTPATIENT)
Dept: FAMILY MEDICINE CLINIC | Facility: CLINIC | Age: 53
End: 2023-03-15
Payer: MEDICARE

## 2023-03-15 ENCOUNTER — TELEPHONE (OUTPATIENT)
Dept: FAMILY MEDICINE CLINIC | Facility: CLINIC | Age: 53
End: 2023-03-15
Payer: MEDICARE

## 2023-03-15 DIAGNOSIS — E11.65 TYPE 2 DIABETES MELLITUS WITH HYPERGLYCEMIA, WITHOUT LONG-TERM CURRENT USE OF INSULIN: ICD-10-CM

## 2023-03-15 PROCEDURE — 85027 COMPLETE CBC AUTOMATED: CPT | Performed by: FAMILY MEDICINE

## 2023-03-15 PROCEDURE — 36415 COLL VENOUS BLD VENIPUNCTURE: CPT

## 2023-03-15 PROCEDURE — 80053 COMPREHEN METABOLIC PANEL: CPT | Performed by: FAMILY MEDICINE

## 2023-03-15 PROCEDURE — 83036 HEMOGLOBIN GLYCOSYLATED A1C: CPT | Performed by: FAMILY MEDICINE

## 2023-03-15 NOTE — TELEPHONE ENCOUNTER
Contacted the pt and informed him of overdue lab work. The pt stated that he would have them done at his earliest convenience.

## 2023-03-16 LAB
ALBUMIN SERPL-MCNC: 4 G/DL (ref 3.5–5.2)
ALBUMIN/GLOB SERPL: 1.4 G/DL
ALP SERPL-CCNC: 86 U/L (ref 39–117)
ALT SERPL W P-5'-P-CCNC: 31 U/L (ref 1–41)
ANION GAP SERPL CALCULATED.3IONS-SCNC: 9 MMOL/L (ref 5–15)
AST SERPL-CCNC: 26 U/L (ref 1–40)
BILIRUB SERPL-MCNC: 0.4 MG/DL (ref 0–1.2)
BUN SERPL-MCNC: 13 MG/DL (ref 6–20)
BUN/CREAT SERPL: 16.5 (ref 7–25)
CALCIUM SPEC-SCNC: 9.2 MG/DL (ref 8.6–10.5)
CHLORIDE SERPL-SCNC: 102 MMOL/L (ref 98–107)
CO2 SERPL-SCNC: 29 MMOL/L (ref 22–29)
CREAT SERPL-MCNC: 0.79 MG/DL (ref 0.76–1.27)
DEPRECATED RDW RBC AUTO: 41.7 FL (ref 37–54)
EGFRCR SERPLBLD CKD-EPI 2021: 106.2 ML/MIN/1.73
ERYTHROCYTE [DISTWIDTH] IN BLOOD BY AUTOMATED COUNT: 12.7 % (ref 12.3–15.4)
GLOBULIN UR ELPH-MCNC: 2.9 GM/DL
GLUCOSE SERPL-MCNC: 176 MG/DL (ref 65–99)
HBA1C MFR BLD: 6.8 % (ref 4.8–5.6)
HCT VFR BLD AUTO: 45.6 % (ref 37.5–51)
HGB BLD-MCNC: 15.4 G/DL (ref 13–17.7)
MCH RBC QN AUTO: 30.3 PG (ref 26.6–33)
MCHC RBC AUTO-ENTMCNC: 33.8 G/DL (ref 31.5–35.7)
MCV RBC AUTO: 89.6 FL (ref 79–97)
PLATELET # BLD AUTO: 189 10*3/MM3 (ref 140–450)
PMV BLD AUTO: 12.7 FL (ref 6–12)
POTASSIUM SERPL-SCNC: 4.6 MMOL/L (ref 3.5–5.2)
PROT SERPL-MCNC: 6.9 G/DL (ref 6–8.5)
RBC # BLD AUTO: 5.09 10*6/MM3 (ref 4.14–5.8)
SODIUM SERPL-SCNC: 140 MMOL/L (ref 136–145)
WBC NRBC COR # BLD: 5.31 10*3/MM3 (ref 3.4–10.8)

## 2023-03-17 ENCOUNTER — TELEPHONE (OUTPATIENT)
Dept: FAMILY MEDICINE CLINIC | Facility: CLINIC | Age: 53
End: 2023-03-17
Payer: MEDICARE

## 2023-03-17 NOTE — TELEPHONE ENCOUNTER
----- Message from Everardo Venegas DO sent at 3/17/2023  1:43 PM EDT -----  Blood work is stable.  No concerns

## 2023-03-28 ENCOUNTER — OFFICE VISIT (OUTPATIENT)
Dept: PSYCHIATRY | Facility: CLINIC | Age: 53
End: 2023-03-28
Payer: MEDICARE

## 2023-03-28 VITALS
HEART RATE: 98 BPM | HEIGHT: 74 IN | WEIGHT: 263 LBS | DIASTOLIC BLOOD PRESSURE: 78 MMHG | SYSTOLIC BLOOD PRESSURE: 130 MMHG | BODY MASS INDEX: 33.75 KG/M2

## 2023-03-28 DIAGNOSIS — Z79.899 MEDICATION MANAGEMENT: ICD-10-CM

## 2023-03-28 DIAGNOSIS — G47.09 OTHER INSOMNIA: ICD-10-CM

## 2023-03-28 DIAGNOSIS — F43.10 PTSD (POST-TRAUMATIC STRESS DISORDER): ICD-10-CM

## 2023-03-28 DIAGNOSIS — F60.1 SCHIZOID PERSONALITY DISORDER IN ADULT: Primary | ICD-10-CM

## 2023-03-28 DIAGNOSIS — F41.1 GENERALIZED ANXIETY DISORDER: ICD-10-CM

## 2023-03-28 PROCEDURE — 99214 OFFICE O/P EST MOD 30 MIN: CPT | Performed by: NURSE PRACTITIONER

## 2023-03-28 PROCEDURE — 1160F RVW MEDS BY RX/DR IN RCRD: CPT | Performed by: NURSE PRACTITIONER

## 2023-03-28 PROCEDURE — 1159F MED LIST DOCD IN RCRD: CPT | Performed by: NURSE PRACTITIONER

## 2023-03-28 RX ORDER — BREXPIPRAZOLE 4 MG/1
4 TABLET ORAL DAILY
Qty: 90 TABLET | Refills: 0 | Status: SHIPPED | OUTPATIENT
Start: 2023-03-28

## 2023-03-28 RX ORDER — HYDROXYZINE HYDROCHLORIDE 10 MG/1
10 TABLET, FILM COATED ORAL 2 TIMES DAILY PRN
Qty: 180 TABLET | Refills: 0 | Status: SHIPPED | OUTPATIENT
Start: 2023-03-28

## 2023-03-28 RX ORDER — FLUOXETINE HYDROCHLORIDE 40 MG/1
80 CAPSULE ORAL DAILY
Qty: 180 CAPSULE | Refills: 0 | Status: SHIPPED | OUTPATIENT
Start: 2023-03-28

## 2023-03-28 RX ORDER — TRAZODONE HYDROCHLORIDE 150 MG/1
150 TABLET ORAL NIGHTLY
Qty: 90 TABLET | Refills: 0 | Status: SHIPPED | OUTPATIENT
Start: 2023-03-28

## 2023-03-28 NOTE — PROGRESS NOTES
Subjective   Jamarcus Allison is a 53 y.o. male who presents today for follow up    Chief Complaint:  Depression, anxiety    History of Present Illness: Patient presents as follow up. He reports anxiety and depression is well controlled with medication. States he has been dealing with some physical pain including back and hip pain. Reports seeing PCP for treatment.   He reports sleep is fair, contributes this to his neighbors being noisy. Reports appetite is good, states a local [employer] has also been donating food and lack of access is not currently an issue. He denies SI/HI/AVH.    The following portions of the patient's history were reviewed and updated as appropriate: allergies, current medications, past family history, past medical history, past social history, past surgical history and problem list.      Past Medical History:  Past Medical History:   Diagnosis Date   • Arthritis    • Chronic pain disorder    • Colon polyp    • Depression    • Diabetes mellitus (HCC)    • Diabetic neuropathy (HCC)    • Elevated cholesterol    • GERD (gastroesophageal reflux disease)    • Hypertension    • IBS (irritable bowel syndrome)    • Obsessive-compulsive disorder    • Peripheral artery disease (HCC)    • Peripheral neuropathy    • Psychiatric illness    • Psychosis (HCC)    • PTSD (post-traumatic stress disorder)    • Rectal bleeding    • Schizoaffective disorder (HCC)    • Schizoid personality (HCC)        Social History:  Social History     Socioeconomic History   • Marital status: Single   Tobacco Use   • Smoking status: Never   • Smokeless tobacco: Never   Vaping Use   • Vaping Use: Never used   Substance and Sexual Activity   • Alcohol use: Never   • Drug use: Never   • Sexual activity: Not Currently     Partners: Female     Birth control/protection: Condom       Family History:  Family History   Problem Relation Age of Onset   • Heart disease Mother    • Diabetes Mother    • Cancer Father    • Alcohol abuse Father     • Heart disease Sister    • Diabetes Sister    • Cancer Sister    • Thyroid disease Sister    • Depression Sister    • Anxiety disorder Sister    • ADD / ADHD Sister    • Depression Brother    • Anxiety disorder Brother    • Alcohol abuse Brother    • Drug abuse Brother    • Paranoid behavior Brother        Past Surgical History:  Past Surgical History:   Procedure Laterality Date   • CATARACT EXTRACTION     • COLONOSCOPY     • COLONOSCOPY N/A 07/13/2022    Procedure: COLONOSCOPY;  Surgeon: Freddy Mortensen MD;  Location: [facility];  Service: Gastroenterology;  Laterality: N/A;   • EYE SURGERY     • POLYPECTOMY     • VASECTOMY         Problem List:  Patient Active Problem List   Diagnosis   • Type 2 diabetes mellitus with hyperglycemia, without long-term current use of insulin (HCC)   • Screening for colon cancer   • History of colonic polyps   • Rectal bleeding       Allergy:   Allergies   Allergen Reactions   • Poison Ivy Extract Anaphylaxis   • Poison Oak Extract Anaphylaxis   • Sumac Anaphylaxis        Current Medications:   Current Outpatient Medications   Medication Sig Dispense Refill   • atorvastatin (LIPITOR) 40 MG tablet Take 1 tablet by mouth Every Night. 90 tablet 1   • Blood Glucose Monitoring Suppl (B-D LOGIC BLOOD GLUCOSE) w/Device kit 1 Device Daily. 1 each 0   • Brexpiprazole (Rexulti) 4 MG tablet Take 1 tablet by mouth Daily. 90 tablet 0   • empagliflozin (Jardiance) 25 MG tablet tablet Take 1 tablet by mouth Daily. 90 tablet 0   • FLUoxetine (PROzac) 40 MG capsule Take 2 capsules by mouth Daily. 180 capsule 0   • glipiZIDE-metFORMIN (METAGLIP) 5-500 MG per tablet Take 1 tablet by mouth 2 (Two) Times a Day Before Meals. 180 tablet 0   • glucose blood test strip Check daily 200 each 12   • Hydrocort-Pramoxine, Perianal, (Proctofoam HC) 1-1 % rectal foam Insert 1 application into the rectum 2 (Two) Times a Day. 1 each 0   • hydrOXYzine (ATARAX) 10 MG tablet Take 1 tablet by mouth 2 (Two) Times  "a Day As Needed for Anxiety. 180 tablet 0   • Lancets (freestyle) lancets Check daily fasting 200 each 12   • lisinopril (PRINIVIL,ZESTRIL) 5 MG tablet Take 1 tablet by mouth Daily. 90 tablet 1   • loratadine (CLARITIN) 10 MG tablet Take 1 tablet by mouth Daily. 90 tablet 0   • pioglitazone (ACTOS) 45 MG tablet Take 1 tablet by mouth Daily. 90 tablet 0   • traZODone (DESYREL) 150 MG tablet Take 1 tablet by mouth Every Night. 90 tablet 0     No current facility-administered medications for this visit.       Review of Symptoms:    Review of Systems   Constitutional: Positive for fatigue.   HENT: Negative.    Eyes: Negative.    Respiratory: Negative.    Cardiovascular: Negative.    Gastrointestinal: Negative.    Genitourinary: Negative.    Musculoskeletal: Positive for arthralgias and back pain.   Skin: Negative.    Neurological: Negative.    Psychiatric/Behavioral: Positive for sleep disturbance and depressed mood. Negative for suicidal ideas. The patient is nervous/anxious.        Objective   Physical Exam:   Blood pressure 130/78, pulse 98, height 188 cm (74.02\"), weight 119 kg (263 lb).  Body mass index is 33.75 kg/m².    Appearance: Well nourished male, appropriately dressed, appears stated age and in no acute distress  Gait, Station, Strength: WNL    Mental Status Exam:   Hygiene:   good  Cooperation:  Cooperative  Eye Contact:  Good  Psychomotor Behavior:  Appropriate  Affect:  Appropriate  Mood: anxious  Hopelessness: 2  Speech:  Minimal  Thought Process:  Linear  Thought Content:  Mood congruent  Suicidal:  None  Homicidal:  None  Hallucinations:  None  Delusion:  None  Memory:  Intact  Orientation:  Person, Place, Time and Situation  Reliability:  good  Insight:  Fair  Judgement:  Fair  Impulse Control:  Good  Physical/Medical Issues:  Yes see med hx     PHQ-Score Total:  PHQ-9 Total Score: 2         Lab Results:   Lab on 03/15/2023   Component Date Value Ref Range Status   • Glucose 03/15/2023 176 (H)  65 - " 99 mg/dL Final   • BUN 03/15/2023 13  6 - 20 mg/dL Final   • Creatinine 03/15/2023 0.79  0.76 - 1.27 mg/dL Final   • Sodium 03/15/2023 140  136 - 145 mmol/L Final   • Potassium 03/15/2023 4.6  3.5 - 5.2 mmol/L Final   • Chloride 03/15/2023 102  98 - 107 mmol/L Final   • CO2 03/15/2023 29.0  22.0 - 29.0 mmol/L Final   • Calcium 03/15/2023 9.2  8.6 - 10.5 mg/dL Final   • Total Protein 03/15/2023 6.9  6.0 - 8.5 g/dL Final   • Albumin 03/15/2023 4.0  3.5 - 5.2 g/dL Final   • ALT (SGPT) 03/15/2023 31  1 - 41 U/L Final   • AST (SGOT) 03/15/2023 26  1 - 40 U/L Final   • Alkaline Phosphatase 03/15/2023 86  39 - 117 U/L Final   • Total Bilirubin 03/15/2023 0.4  0.0 - 1.2 mg/dL Final   • Globulin 03/15/2023 2.9  gm/dL Final   • A/G Ratio 03/15/2023 1.4  g/dL Final   • BUN/Creatinine Ratio 03/15/2023 16.5  7.0 - 25.0 Final   • Anion Gap 03/15/2023 9.0  5.0 - 15.0 mmol/L Final   • eGFR 03/15/2023 106.2  >60.0 mL/min/1.73 Final   • Hemoglobin A1C 03/15/2023 6.80 (H)  4.80 - 5.60 % Final   • WBC 03/15/2023 5.31  3.40 - 10.80 10*3/mm3 Final   • RBC 03/15/2023 5.09  4.14 - 5.80 10*6/mm3 Final   • Hemoglobin 03/15/2023 15.4  13.0 - 17.7 g/dL Final   • Hematocrit 03/15/2023 45.6  37.5 - 51.0 % Final   • MCV 03/15/2023 89.6  79.0 - 97.0 fL Final   • MCH 03/15/2023 30.3  26.6 - 33.0 pg Final   • MCHC 03/15/2023 33.8  31.5 - 35.7 g/dL Final   • RDW 03/15/2023 12.7  12.3 - 15.4 % Final   • RDW-SD 03/15/2023 41.7  37.0 - 54.0 fl Final   • MPV 03/15/2023 12.7 (H)  6.0 - 12.0 fL Final   • Platelets 03/15/2023 189  140 - 450 10*3/mm3 Final       Assessment & Plan   Diagnoses and all orders for this visit:    1. Schizoid personality disorder in adult (HCC) (Primary)  -     Brexpiprazole (Rexulti) 4 MG tablet; Take 1 tablet by mouth Daily.  Dispense: 90 tablet; Refill: 0  -     FLUoxetine (PROzac) 40 MG capsule; Take 2 capsules by mouth Daily.  Dispense: 180 capsule; Refill: 0    2. Generalized anxiety disorder  -     Brexpiprazole (Rexulti) 4  MG tablet; Take 1 tablet by mouth Daily.  Dispense: 90 tablet; Refill: 0  -     FLUoxetine (PROzac) 40 MG capsule; Take 2 capsules by mouth Daily.  Dispense: 180 capsule; Refill: 0  -     hydrOXYzine (ATARAX) 10 MG tablet; Take 1 tablet by mouth 2 (Two) Times a Day As Needed for Anxiety.  Dispense: 180 tablet; Refill: 0  -     traZODone (DESYREL) 150 MG tablet; Take 1 tablet by mouth Every Night.  Dispense: 90 tablet; Refill: 0    3. Other insomnia  -     traZODone (DESYREL) 150 MG tablet; Take 1 tablet by mouth Every Night.  Dispense: 90 tablet; Refill: 0    4. Medication management    5. PTSD (post-traumatic stress disorder)        -Continue brexpiprazole 4 mg daily for AVH and paranoia. Lengthy discussion with patient on the possible side effects of antipsychotic medications including increased cholesterol, increased blood sugar, and possibility of weight gain.  Also discussed the need to monitor lab work associated with this. The risk of muscle movement disorders with this class of medication was also discussed.  -Continue trazodone 150 mg nightly for sleep  -Continue fluoxetine 80 mg daily for anxiety and depression  -Continue hydroxyzine 10 mg twice daily as needed for anxiety  -Provided patient with contact information of additional benefits as well as information for applying for snap benefits  -Encouraged patient to begin therapy  -YADIEL reviewed and appropriate. Patient counseled on use of controlled substances.   -The benefits of a healthy diet and exercise were discussed with patient, especially the positive effects they have on mental health. Patient encouraged to consider lifestyle modification regarding  diet and exercise patterns to maximize results of mental health treatment.  -Reviewed previous available documentation  -Reviewed most recent available labs                  Visit Diagnoses:    ICD-10-CM ICD-9-CM   1. Schizoid personality disorder in adult (HCC)  F60.1 301.20   2. Generalized anxiety  disorder  F41.1 300.02   3. Other insomnia  G47.09 780.52   4. Medication management  Z79.899 V58.69   5. PTSD (post-traumatic stress disorder)  F43.10 309.81         TREATMENT PLAN/GOALS: Continue supportive psychotherapy efforts and medications as indicated. Treatment and medication options discussed during today's visit. Patient acknowledged and verbally consented to continue with current treatment plan and was educated on the importance of compliance with treatment and follow-up appointments.    MEDICATION ISSUES:    Discussed medication options and treatment plan of prescribed medication as well as the risks, benefits, and side effects including potential falls, possible impaired driving and metabolic adversities among others. Patient is agreeable to call the office with any worsening of symptoms or onset of side effects. Patient is agreeable to call 911 or go to the nearest ER should he/she begin having SI/HI.     MEDS ORDERED DURING VISIT:  New Medications Ordered This Visit   Medications   • Brexpiprazole (Rexulti) 4 MG tablet     Sig: Take 1 tablet by mouth Daily.     Dispense:  90 tablet     Refill:  0   • FLUoxetine (PROzac) 40 MG capsule     Sig: Take 2 capsules by mouth Daily.     Dispense:  180 capsule     Refill:  0   • hydrOXYzine (ATARAX) 10 MG tablet     Sig: Take 1 tablet by mouth 2 (Two) Times a Day As Needed for Anxiety.     Dispense:  180 tablet     Refill:  0   • traZODone (DESYREL) 150 MG tablet     Sig: Take 1 tablet by mouth Every Night.     Dispense:  90 tablet     Refill:  0       Return in about 3 months (around 6/28/2023), or if symptoms worsen or fail to improve.         Prognosis: Guarded dependent on medication/follow up and treatment plan compliance.  Functionality: pt showing improvements in important areas of daily functioning.     Short-term goals: Patient will adhere to medication regimen and note continued improvement in symptoms over the next 3 months.   Long-term goals:  Patient will be adherent to medication management and psychotherapy with continued improvement in symptoms over the next 6 months          This document has been electronically signed by RUPINDER Gann   March 28, 2023 14:47 EDT    Part of this note may be an electronic transcription/translation of spoken language to printed text using the Dragon Dictation System.

## 2023-05-23 ENCOUNTER — OFFICE VISIT (OUTPATIENT)
Dept: FAMILY MEDICINE CLINIC | Facility: CLINIC | Age: 53
End: 2023-05-23
Payer: MEDICARE

## 2023-05-23 VITALS
WEIGHT: 264.2 LBS | BODY MASS INDEX: 33.91 KG/M2 | SYSTOLIC BLOOD PRESSURE: 134 MMHG | OXYGEN SATURATION: 94 % | DIASTOLIC BLOOD PRESSURE: 78 MMHG | HEART RATE: 76 BPM | HEIGHT: 74 IN | TEMPERATURE: 97.5 F

## 2023-05-23 DIAGNOSIS — K58.0 IRRITABLE BOWEL SYNDROME WITH DIARRHEA: Primary | ICD-10-CM

## 2023-05-23 PROCEDURE — 99213 OFFICE O/P EST LOW 20 MIN: CPT | Performed by: FAMILY MEDICINE

## 2023-05-23 PROCEDURE — 3044F HG A1C LEVEL LT 7.0%: CPT | Performed by: FAMILY MEDICINE

## 2023-05-23 RX ORDER — DICYCLOMINE HYDROCHLORIDE 10 MG/1
10 CAPSULE ORAL
Qty: 360 CAPSULE | Refills: 0 | Status: SHIPPED | OUTPATIENT
Start: 2023-05-23

## 2023-05-23 NOTE — PROGRESS NOTES
Subjective   Jamarcus Allison is a 53 y.o. male.   Pt presents today with CC of diarrhea      History of Present Illness   History of Present Illness  Patient is a 53-year-old male who complains of chronic diarrhea.  He took Bentyl in the past with good result.  He reports history of urgency that has led to incontinence issues.  He request refill of Bentyl       The following portions of the patient's history were reviewed and updated as appropriate: allergies, current medications, past family history, past medical history, past social history, past surgical history and problem list.    Review of Systems   Constitutional: Negative for chills, fever and unexpected weight loss.   HENT: Negative for congestion and sore throat.    Eyes: Negative for blurred vision and visual disturbance.   Respiratory: Negative for cough and wheezing.    Cardiovascular: Negative for chest pain and palpitations.   Gastrointestinal: Positive for diarrhea. Negative for abdominal pain.   Endocrine: Negative for cold intolerance and heat intolerance.   Genitourinary: Negative for dysuria.   Musculoskeletal: Negative for arthralgias and neck stiffness.   Neurological: Negative for dizziness, seizures and syncope.   Psychiatric/Behavioral: Negative for self-injury, suicidal ideas and depressed mood.       Objective   Physical Exam  Vitals and nursing note reviewed.   Constitutional:       Appearance: He is well-developed.   HENT:      Head: Normocephalic and atraumatic.      Right Ear: External ear normal.      Left Ear: External ear normal.      Nose: Nose normal.   Eyes:      Conjunctiva/sclera: Conjunctivae normal.      Pupils: Pupils are equal, round, and reactive to light.   Cardiovascular:      Rate and Rhythm: Normal rate and regular rhythm.      Heart sounds: Normal heart sounds.   Pulmonary:      Effort: Pulmonary effort is normal.      Breath sounds: Normal breath sounds.   Abdominal:      General: Bowel sounds are normal.       Palpations: Abdomen is soft.   Musculoskeletal:      Cervical back: Normal range of motion and neck supple.   Skin:     General: Skin is warm and dry.   Neurological:      Mental Status: He is alert and oriented to person, place, and time.   Psychiatric:         Behavior: Behavior normal.           Assessment & Plan   Diagnoses and all orders for this visit:    1. Irritable bowel syndrome with diarrhea (Primary)  -     dicyclomine (BENTYL) 10 MG capsule; Take 1 capsule by mouth 4 (Four) Times a Day Before Meals & at Bedtime.  Dispense: 360 capsule; Refill: 0      We discussed the side effects of this medication including constipation.  He took this medication well in the past.  He intends on taking this medication for a few days as directed because of a recent worsening of symptoms.  After that, he plans on taking it as needed.  This is reasonable.            BMI is >= 30 and <35. (Class 1 Obesity). The following options were offered after discussion;: exercise counseling/recommendations and nutrition counseling/recommendations          This document has been electronically signed by Margie Atkins  May 23, 2023 09:47 EDT    Dictated Utilizing Dragon Dictation: Part of this note may be an electronic transcription/translation of spoken language to printed text using the Dragon Dictation System.

## 2023-09-21 ENCOUNTER — OFFICE VISIT (OUTPATIENT)
Dept: PSYCHIATRY | Facility: CLINIC | Age: 53
End: 2023-09-21
Payer: MEDICARE

## 2023-09-21 VITALS
SYSTOLIC BLOOD PRESSURE: 126 MMHG | WEIGHT: 276 LBS | HEART RATE: 77 BPM | HEIGHT: 74 IN | DIASTOLIC BLOOD PRESSURE: 68 MMHG | BODY MASS INDEX: 35.42 KG/M2 | OXYGEN SATURATION: 95 %

## 2023-09-21 DIAGNOSIS — Z79.899 MEDICATION MANAGEMENT: ICD-10-CM

## 2023-09-21 DIAGNOSIS — G47.09 OTHER INSOMNIA: ICD-10-CM

## 2023-09-21 DIAGNOSIS — F60.1 SCHIZOID PERSONALITY DISORDER IN ADULT: Primary | ICD-10-CM

## 2023-09-21 DIAGNOSIS — F41.1 GENERALIZED ANXIETY DISORDER: ICD-10-CM

## 2023-09-21 DIAGNOSIS — F43.10 PTSD (POST-TRAUMATIC STRESS DISORDER): ICD-10-CM

## 2023-09-21 PROCEDURE — 1159F MED LIST DOCD IN RCRD: CPT | Performed by: NURSE PRACTITIONER

## 2023-09-21 PROCEDURE — 1160F RVW MEDS BY RX/DR IN RCRD: CPT | Performed by: NURSE PRACTITIONER

## 2023-09-21 PROCEDURE — 99214 OFFICE O/P EST MOD 30 MIN: CPT | Performed by: NURSE PRACTITIONER

## 2023-09-21 RX ORDER — FLUOXETINE HYDROCHLORIDE 40 MG/1
80 CAPSULE ORAL DAILY
Qty: 180 CAPSULE | Refills: 0 | Status: SHIPPED | OUTPATIENT
Start: 2023-09-21

## 2023-09-21 RX ORDER — TRAZODONE HYDROCHLORIDE 150 MG/1
150 TABLET ORAL NIGHTLY
Qty: 90 TABLET | Refills: 0 | Status: SHIPPED | OUTPATIENT
Start: 2023-09-21

## 2023-09-21 RX ORDER — HYDROXYZINE HYDROCHLORIDE 10 MG/1
10 TABLET, FILM COATED ORAL 2 TIMES DAILY PRN
Qty: 180 TABLET | Refills: 0 | Status: SHIPPED | OUTPATIENT
Start: 2023-09-21

## 2023-09-21 RX ORDER — BREXPIPRAZOLE 4 MG/1
4 TABLET ORAL DAILY
Qty: 90 TABLET | Refills: 0 | Status: SHIPPED | OUTPATIENT
Start: 2023-09-21

## 2023-09-21 NOTE — PROGRESS NOTES
"Subjective   Jamarcus Allison is a 53 y.o. male who presents today for follow up    Chief Complaint:  Depression, anxiety    History of Present Illness: Patient presents as follow-up.  States medication is working \"pretty well\".  States he has been stressed recently due to him not being able to drive his vehicle and is unable to obtain any mechanical work.  Reports AH has completely subsided.  He is unable to rate anxiety and depression on a scale of 0-10 with 10 being the worst.  Reports sleep continues to be difficult at times due to noisy neighbors.  He denies SI/HI/AVH.    The following portions of the patient's history were reviewed and updated as appropriate: allergies, current medications, past family history, past medical history, past social history, past surgical history and problem list.      Past Medical History:  Past Medical History:   Diagnosis Date    Arthritis     Chronic pain disorder     Colon polyp     Depression     Diabetes mellitus     Diabetic neuropathy     Elevated cholesterol     GERD (gastroesophageal reflux disease)     Hypertension     IBS (irritable bowel syndrome)     Obsessive-compulsive disorder     Peripheral artery disease     Peripheral neuropathy     Psychiatric illness     Psychosis     PTSD (post-traumatic stress disorder)     Rectal bleeding     Schizoaffective disorder     Schizoid personality        Social History:  Social History     Socioeconomic History    Marital status: Single   Tobacco Use    Smoking status: Never    Smokeless tobacco: Never   Vaping Use    Vaping Use: Never used   Substance and Sexual Activity    Alcohol use: Never    Drug use: Never    Sexual activity: Not Currently     Partners: Female     Birth control/protection: Condom       Family History:  Family History   Problem Relation Age of Onset    Heart disease Mother     Diabetes Mother     Cancer Father     Alcohol abuse Father     Heart disease Sister     Diabetes Sister     Cancer Sister     Thyroid " disease Sister     Depression Sister     Anxiety disorder Sister     ADD / ADHD Sister     Depression Brother     Anxiety disorder Brother     Alcohol abuse Brother     Drug abuse Brother     Paranoid behavior Brother        Past Surgical History:  Past Surgical History:   Procedure Laterality Date    CATARACT EXTRACTION      COLONOSCOPY      COLONOSCOPY N/A 07/13/2022    Procedure: COLONOSCOPY;  Surgeon: Freddy Mortensen MD;  Location: [facility];  Service: Gastroenterology;  Laterality: N/A;    EYE SURGERY      POLYPECTOMY      VASECTOMY         Problem List:  Patient Active Problem List   Diagnosis    Type 2 diabetes mellitus with hyperglycemia, without long-term current use of insulin    Screening for colon cancer    History of colonic polyps    Rectal bleeding       Allergy:   Allergies   Allergen Reactions    Poison Ivy Extract Anaphylaxis    Poison Oak Extract Anaphylaxis    Sumac Anaphylaxis        Current Medications:   Current Outpatient Medications   Medication Sig Dispense Refill    atorvastatin (LIPITOR) 40 MG tablet Take 1 tablet by mouth Every Night. 90 tablet 1    Blood Glucose Monitoring Suppl (MiiPharosD LOGIC BLOOD GLUCOSE) w/Device kit 1 Device Daily. 1 each 0    Brexpiprazole (Rexulti) 4 MG tablet Take 1 tablet by mouth Daily. 90 tablet 0    empagliflozin (Jardiance) 25 MG tablet tablet Take 1 tablet by mouth Daily. 90 tablet 0    FLUoxetine (PROzac) 40 MG capsule Take 2 capsules by mouth Daily. 180 capsule 0    glipiZIDE-metFORMIN (METAGLIP) 5-500 MG per tablet Take 1 tablet by mouth 2 (Two) Times a Day Before Meals. 180 tablet 0    glucose blood test strip Check daily 200 each 12    Hydrocort-Pramoxine, Perianal, (Proctofoam HC) 1-1 % rectal foam Insert 1 application into the rectum 2 (Two) Times a Day. 1 each 0    hydrOXYzine (ATARAX) 10 MG tablet Take 1 tablet by mouth 2 (Two) Times a Day As Needed for Anxiety. 180 tablet 0    Lancets (freestyle) lancets Check daily fasting 200 each 12     "lisinopril (PRINIVIL,ZESTRIL) 5 MG tablet Take 1 tablet by mouth Daily. 90 tablet 1    loratadine (CLARITIN) 10 MG tablet Take 1 tablet by mouth Daily. 90 tablet 0    pioglitazone (ACTOS) 45 MG tablet Take 1 tablet by mouth Daily. 90 tablet 0    traZODone (DESYREL) 150 MG tablet Take 1 tablet by mouth Every Night. 90 tablet 0     No current facility-administered medications for this visit.       Review of Symptoms:    Review of Systems   Constitutional:  Positive for fatigue.   HENT: Negative.     Eyes: Negative.    Respiratory: Negative.     Cardiovascular: Negative.    Gastrointestinal: Negative.    Genitourinary: Negative.    Musculoskeletal: Negative.    Skin: Negative.    Neurological: Negative.    Psychiatric/Behavioral:  Positive for hallucinations, sleep disturbance, depressed mood and stress. Negative for suicidal ideas. The patient is nervous/anxious.      Objective   Physical Exam:   Blood pressure 126/68, pulse 77, height 188 cm (74\"), weight 125 kg (276 lb), SpO2 95 %.  Body mass index is 35.44 kg/m².    Appearance: Well nourished male, appropriately dressed, appears stated age and in no acute distress  Gait, Station, Strength: WNL    Mental Status Exam:   Hygiene:   good  Cooperation:  Cooperative  Eye Contact:  Good  Psychomotor Behavior:  Appropriate  Affect:  Appropriate  Mood: normal  Hopelessness: Denies  Speech:  Normal  Thought Process:  Linear  Thought Content:  Mood congruent  Suicidal:  None  Homicidal:  None  Hallucinations:  None  Delusion:  None  Memory:  Intact  Orientation:  Person, Place, Time, and Situation  Reliability:  good  Insight:  Fair  Judgement:  Fair  Impulse Control:  Fair  Physical/Medical Issues:   See med hx      PHQ-Score Total:  PHQ-9 Total Score: 14   Patient screened positive for depression based on a PHQ-9 score of 14 on 9/21/2023. Follow-up recommendations include: Prescribed antidepressant medication treatment and Suicide Risk Assessment performed.He denies any " SI        Lab Results:   No visits with results within 1 Month(s) from this visit.   Latest known visit with results is:   Lab on 03/15/2023   Component Date Value Ref Range Status    Glucose 03/15/2023 176 (H)  65 - 99 mg/dL Final    BUN 03/15/2023 13  6 - 20 mg/dL Final    Creatinine 03/15/2023 0.79  0.76 - 1.27 mg/dL Final    Sodium 03/15/2023 140  136 - 145 mmol/L Final    Potassium 03/15/2023 4.6  3.5 - 5.2 mmol/L Final    Chloride 03/15/2023 102  98 - 107 mmol/L Final    CO2 03/15/2023 29.0  22.0 - 29.0 mmol/L Final    Calcium 03/15/2023 9.2  8.6 - 10.5 mg/dL Final    Total Protein 03/15/2023 6.9  6.0 - 8.5 g/dL Final    Albumin 03/15/2023 4.0  3.5 - 5.2 g/dL Final    ALT (SGPT) 03/15/2023 31  1 - 41 U/L Final    AST (SGOT) 03/15/2023 26  1 - 40 U/L Final    Alkaline Phosphatase 03/15/2023 86  39 - 117 U/L Final    Total Bilirubin 03/15/2023 0.4  0.0 - 1.2 mg/dL Final    Globulin 03/15/2023 2.9  gm/dL Final    A/G Ratio 03/15/2023 1.4  g/dL Final    BUN/Creatinine Ratio 03/15/2023 16.5  7.0 - 25.0 Final    Anion Gap 03/15/2023 9.0  5.0 - 15.0 mmol/L Final    eGFR 03/15/2023 106.2  >60.0 mL/min/1.73 Final    Hemoglobin A1C 03/15/2023 6.80 (H)  4.80 - 5.60 % Final    WBC 03/15/2023 5.31  3.40 - 10.80 10*3/mm3 Final    RBC 03/15/2023 5.09  4.14 - 5.80 10*6/mm3 Final    Hemoglobin 03/15/2023 15.4  13.0 - 17.7 g/dL Final    Hematocrit 03/15/2023 45.6  37.5 - 51.0 % Final    MCV 03/15/2023 89.6  79.0 - 97.0 fL Final    MCH 03/15/2023 30.3  26.6 - 33.0 pg Final    MCHC 03/15/2023 33.8  31.5 - 35.7 g/dL Final    RDW 03/15/2023 12.7  12.3 - 15.4 % Final    RDW-SD 03/15/2023 41.7  37.0 - 54.0 fl Final    MPV 03/15/2023 12.7 (H)  6.0 - 12.0 fL Final    Platelets 03/15/2023 189  140 - 450 10*3/mm3 Final       Assessment & Plan   Diagnoses and all orders for this visit:    1. Schizoid personality disorder in adult (Primary)  -     Brexpiprazole (Rexulti) 4 MG tablet; Take 1 tablet by mouth Daily.  Dispense: 90 tablet;  Refill: 0  -     FLUoxetine (PROzac) 40 MG capsule; Take 2 capsules by mouth Daily.  Dispense: 180 capsule; Refill: 0    2. Generalized anxiety disorder  -     Brexpiprazole (Rexulti) 4 MG tablet; Take 1 tablet by mouth Daily.  Dispense: 90 tablet; Refill: 0  -     FLUoxetine (PROzac) 40 MG capsule; Take 2 capsules by mouth Daily.  Dispense: 180 capsule; Refill: 0  -     hydrOXYzine (ATARAX) 10 MG tablet; Take 1 tablet by mouth 2 (Two) Times a Day As Needed for Anxiety.  Dispense: 180 tablet; Refill: 0  -     traZODone (DESYREL) 150 MG tablet; Take 1 tablet by mouth Every Night.  Dispense: 90 tablet; Refill: 0    3. Other insomnia  -     traZODone (DESYREL) 150 MG tablet; Take 1 tablet by mouth Every Night.  Dispense: 90 tablet; Refill: 0    4. Medication management  -     CBC Auto Differential; Future  -     Comprehensive Metabolic Panel; Future  -     Hemoglobin A1c; Future  -     Thyroid Panel With TSH; Future  -     Lipid Panel; Future    5. PTSD (post-traumatic stress disorder)        -Continue brexpiprazole 4 mg daily for AVH and paranoia. Lengthy discussion with patient on the possible side effects of antipsychotic medications including increased cholesterol, increased blood sugar, and possibility of weight gain.  Also discussed the need to monitor lab work associated with this. The risk of muscle movement disorders with this class of medication was also discussed.  -Continue trazodone 150 mg nightly for sleep  -Continue fluoxetine 80 mg daily for anxiety and depression  -Continue hydroxyzine 10 mg twice daily as needed for anxiety  -Will order routine labs   -Encouraged patient to begin therapy  -YADIEL reviewed and appropriate. Patient counseled on use of controlled substances.  -The benefits of a healthy diet and exercise were discussed with patient, especially the positive effects they have on mental health. Patient encouraged to consider lifestyle modification regarding  diet and exercise patterns to  maximize results of mental health treatment.  -Reviewed previous available documentation  -Reviewed most recent available labs                  Visit Diagnoses:    ICD-10-CM ICD-9-CM   1. Schizoid personality disorder in adult  F60.1 301.20   2. Generalized anxiety disorder  F41.1 300.02   3. Other insomnia  G47.09 780.52   4. Medication management  Z79.899 V58.69   5. PTSD (post-traumatic stress disorder)  F43.10 309.81         TREATMENT PLAN/GOALS: Continue supportive psychotherapy efforts and medications as indicated. Treatment and medication options discussed during today's visit. Patient acknowledged and verbally consented to continue with current treatment plan and was educated on the importance of compliance with treatment and follow-up appointments.    MEDICATION ISSUES:    Discussed medication options and treatment plan of prescribed medication as well as the risks, benefits, and side effects including potential falls, possible impaired driving and metabolic adversities among others. Patient is agreeable to call the office with any worsening of symptoms or onset of side effects. Patient is agreeable to call 911 or go to the nearest ER should he/she begin having SI/HI.     MEDS ORDERED DURING VISIT:  New Medications Ordered This Visit   Medications    Brexpiprazole (Rexulti) 4 MG tablet     Sig: Take 1 tablet by mouth Daily.     Dispense:  90 tablet     Refill:  0    FLUoxetine (PROzac) 40 MG capsule     Sig: Take 2 capsules by mouth Daily.     Dispense:  180 capsule     Refill:  0    hydrOXYzine (ATARAX) 10 MG tablet     Sig: Take 1 tablet by mouth 2 (Two) Times a Day As Needed for Anxiety.     Dispense:  180 tablet     Refill:  0    traZODone (DESYREL) 150 MG tablet     Sig: Take 1 tablet by mouth Every Night.     Dispense:  90 tablet     Refill:  0       Return in about 3 months (around 12/21/2023), or if symptoms worsen or fail to improve.         Prognosis: Guarded dependent on medication/follow up and  treatment plan compliance.  Functionality: pt showing improvements in important areas of daily functioning.     Short-term goals: Patient will adhere to medication regimen and note continued improvement in symptoms over the next 3 months.   Long-term goals: Patient will be adherent to medication management and psychotherapy with continued improvement in symptoms over the next 6 months          This document has been electronically signed by RUPINDER Aguilar   September 21, 2023 11:01 EDT    Part of this note may be an electronic transcription/translation of spoken language to printed text using the Dragon Dictation System.

## 2023-10-17 DIAGNOSIS — E11.65 TYPE 2 DIABETES MELLITUS WITH HYPERGLYCEMIA, WITHOUT LONG-TERM CURRENT USE OF INSULIN: ICD-10-CM

## 2023-10-17 NOTE — TELEPHONE ENCOUNTER
Caller: LENA KEITH    Relationship: Emergency Contact    Best call back number: [phone]     Requested Prescriptions:   Requested Prescriptions     Pending Prescriptions Disp Refills    glipiZIDE-metFORMIN (METAGLIP) 5-500 MG per tablet 180 tablet 0     Sig: Take 1 tablet by mouth 2 (Two) Times a Day Before Meals.        Pharmacy where request should be sent: [id number] - [facility] - [address] AT [facility] [phone] [facility] [phone]      Last office visit with prescribing clinician: 5/23/2023   Last telemedicine visit with prescribing clinician: Visit date not found   Next office visit with prescribing clinician: 12/4/2023       Does the patient have less than a 3 day supply:  [x] Yes  [] No      Vanessa Zarco Rep   10/17/23 14:31 EDT

## 2023-10-18 RX ORDER — GLIPIZIDE AND METFORMIN HCL 5; 500 MG/1; MG/1
1 TABLET, FILM COATED ORAL
Qty: 180 TABLET | Refills: 0 | Status: SHIPPED | OUTPATIENT
Start: 2023-10-18

## 2023-10-20 DIAGNOSIS — K58.0 IRRITABLE BOWEL SYNDROME WITH DIARRHEA: ICD-10-CM

## 2023-10-20 RX ORDER — DICYCLOMINE HYDROCHLORIDE 10 MG/1
10 CAPSULE ORAL
Qty: 360 CAPSULE | Refills: 0 | OUTPATIENT
Start: 2023-10-20

## 2023-12-04 ENCOUNTER — OFFICE VISIT (OUTPATIENT)
Dept: FAMILY MEDICINE CLINIC | Facility: CLINIC | Age: 53
End: 2023-12-04
Payer: MEDICARE

## 2023-12-04 VITALS
TEMPERATURE: 97.1 F | OXYGEN SATURATION: 98 % | SYSTOLIC BLOOD PRESSURE: 126 MMHG | WEIGHT: 261.6 LBS | DIASTOLIC BLOOD PRESSURE: 78 MMHG | BODY MASS INDEX: 33.57 KG/M2 | HEART RATE: 96 BPM | HEIGHT: 74 IN

## 2023-12-04 DIAGNOSIS — E11.65 TYPE 2 DIABETES MELLITUS WITH HYPERGLYCEMIA, WITHOUT LONG-TERM CURRENT USE OF INSULIN: ICD-10-CM

## 2023-12-04 DIAGNOSIS — L81.9 PIGMENTED SKIN LESION SUSPICIOUS FOR MALIGNANT NEOPLASM: ICD-10-CM

## 2023-12-04 DIAGNOSIS — J30.2 SEASONAL ALLERGIES: ICD-10-CM

## 2023-12-04 DIAGNOSIS — F41.9 ANXIETY: ICD-10-CM

## 2023-12-04 DIAGNOSIS — Z00.00 MEDICARE ANNUAL WELLNESS VISIT, SUBSEQUENT: Primary | ICD-10-CM

## 2023-12-04 DIAGNOSIS — K58.0 IRRITABLE BOWEL SYNDROME WITH DIARRHEA: ICD-10-CM

## 2023-12-04 PROCEDURE — 84443 ASSAY THYROID STIM HORMONE: CPT | Performed by: FAMILY MEDICINE

## 2023-12-04 PROCEDURE — 82043 UR ALBUMIN QUANTITATIVE: CPT | Performed by: FAMILY MEDICINE

## 2023-12-04 PROCEDURE — 36415 COLL VENOUS BLD VENIPUNCTURE: CPT | Performed by: FAMILY MEDICINE

## 2023-12-04 PROCEDURE — G0439 PPPS, SUBSEQ VISIT: HCPCS | Performed by: FAMILY MEDICINE

## 2023-12-04 PROCEDURE — 85027 COMPLETE CBC AUTOMATED: CPT | Performed by: FAMILY MEDICINE

## 2023-12-04 PROCEDURE — 80053 COMPREHEN METABOLIC PANEL: CPT | Performed by: FAMILY MEDICINE

## 2023-12-04 PROCEDURE — 1160F RVW MEDS BY RX/DR IN RCRD: CPT | Performed by: FAMILY MEDICINE

## 2023-12-04 PROCEDURE — 83036 HEMOGLOBIN GLYCOSYLATED A1C: CPT | Performed by: FAMILY MEDICINE

## 2023-12-04 PROCEDURE — 1159F MED LIST DOCD IN RCRD: CPT | Performed by: FAMILY MEDICINE

## 2023-12-04 PROCEDURE — 1170F FXNL STATUS ASSESSED: CPT | Performed by: FAMILY MEDICINE

## 2023-12-04 PROCEDURE — 80061 LIPID PANEL: CPT | Performed by: FAMILY MEDICINE

## 2023-12-04 PROCEDURE — 3044F HG A1C LEVEL LT 7.0%: CPT | Performed by: FAMILY MEDICINE

## 2023-12-04 RX ORDER — PIOGLITAZONEHYDROCHLORIDE 45 MG/1
45 TABLET ORAL DAILY
Qty: 90 TABLET | Refills: 1 | Status: SHIPPED | OUTPATIENT
Start: 2023-12-04

## 2023-12-04 RX ORDER — ATORVASTATIN CALCIUM 40 MG/1
40 TABLET, FILM COATED ORAL NIGHTLY
Qty: 90 TABLET | Refills: 1 | Status: SHIPPED | OUTPATIENT
Start: 2023-12-04

## 2023-12-04 RX ORDER — DICYCLOMINE HYDROCHLORIDE 10 MG/1
10 CAPSULE ORAL
COMMUNITY
End: 2023-12-04

## 2023-12-04 RX ORDER — GLIPIZIDE AND METFORMIN HCL 5; 500 MG/1; MG/1
1 TABLET, FILM COATED ORAL
Qty: 180 TABLET | Refills: 1 | Status: SHIPPED | OUTPATIENT
Start: 2023-12-04

## 2023-12-04 RX ORDER — LISINOPRIL 5 MG/1
5 TABLET ORAL DAILY
Qty: 90 TABLET | Refills: 1 | Status: SHIPPED | OUTPATIENT
Start: 2023-12-04

## 2023-12-04 RX ORDER — LORATADINE 10 MG/1
10 TABLET ORAL DAILY
Qty: 90 TABLET | Refills: 1 | Status: SHIPPED | OUTPATIENT
Start: 2023-12-04

## 2023-12-04 RX ORDER — DICYCLOMINE HCL 20 MG
20 TABLET ORAL EVERY 6 HOURS
Qty: 360 TABLET | Refills: 0 | Status: SHIPPED | OUTPATIENT
Start: 2023-12-04

## 2023-12-04 NOTE — PROGRESS NOTES
The ABCs of the Annual Wellness Visit  Subsequent Medicare Wellness Visit    Subjective      Jamarcus Allison is a 53 y.o. male who presents for a Subsequent Medicare Wellness Visit.    The following portions of the patient's history were reviewed and   updated as appropriate: allergies, current medications, past family history, past medical history, past social history, past surgical history, and problem list.    Compared to one year ago, the patient feels his physical   health is worse.    Compared to one year ago, the patient feels his mental   health is the same.    Recent Hospitalizations:  He was not admitted within the past 365 days    Current Medical Providers:  Patient Care Team:  Everardo Venegas DO as PCP - General (Family Medicine)    Outpatient Medications Prior to Visit   Medication Sig Dispense Refill    atorvastatin (LIPITOR) 40 MG tablet Take 1 tablet by mouth Every Night. 90 tablet 1    Blood Glucose Monitoring Suppl (B-D LOGIC BLOOD GLUCOSE) w/Device kit 1 Device Daily. 1 each 0    Brexpiprazole (Rexulti) 4 MG tablet Take 1 tablet by mouth Daily. 90 tablet 0    dicyclomine (BENTYL) 10 MG capsule Take 1 capsule by mouth 4 (Four) Times a Day Before Meals & at Bedtime.      empagliflozin (Jardiance) 25 MG tablet tablet Take 1 tablet by mouth Daily. 90 tablet 0    FLUoxetine (PROzac) 40 MG capsule Take 2 capsules by mouth Daily. 180 capsule 0    glipiZIDE-metFORMIN (METAGLIP) 5-500 MG per tablet Take 1 tablet by mouth 2 (Two) Times a Day Before Meals. 180 tablet 0    glucose blood test strip Check daily 200 each 12    Hydrocort-Pramoxine, Perianal, (Proctofoam HC) 1-1 % rectal foam Insert 1 application into the rectum 2 (Two) Times a Day. 1 each 0    hydrOXYzine (ATARAX) 10 MG tablet Take 1 tablet by mouth 2 (Two) Times a Day As Needed for Anxiety. 180 tablet 0    Lancets (freestyle) lancets Check daily fasting 200 each 12    lisinopril (PRINIVIL,ZESTRIL) 5 MG tablet Take 1 tablet by mouth Daily.  "90 tablet 1    loratadine (CLARITIN) 10 MG tablet Take 1 tablet by mouth Daily. 90 tablet 0    pioglitazone (ACTOS) 45 MG tablet Take 1 tablet by mouth Daily. 90 tablet 0    traZODone (DESYREL) 150 MG tablet Take 1 tablet by mouth Every Night. 90 tablet 0     No facility-administered medications prior to visit.       No opioid medication identified on active medication list. I have reviewed chart for other potential  high risk medication/s and harmful drug interactions in the elderly.        Aspirin is not on active medication list.  Aspirin use is not indicated based on review of current medical condition/s. Risk of harm outweighs potential benefits.  .    Patient Active Problem List   Diagnosis    Type 2 diabetes mellitus with hyperglycemia, without long-term current use of insulin    Screening for colon cancer    History of colonic polyps    Rectal bleeding     Advance Care Planning   Advance Care Planning     Advance Directive is not on file.  ACP discussion was held with the patient during this visit. Patient does not have an advance directive, information provided.     Objective    Vitals:    12/04/23 1158   BP: 126/78   BP Location: Right arm   Patient Position: Sitting   Pulse: 96   Temp: 97.1 °F (36.2 °C)   SpO2: 98%   Weight: 119 kg (261 lb 9.6 oz)   Height: 188 cm (74\")     Estimated body mass index is 33.59 kg/m² as calculated from the following:    Height as of this encounter: 188 cm (74\").    Weight as of this encounter: 119 kg (261 lb 9.6 oz).           Does the patient have evidence of cognitive impairment?   No            HEALTH RISK ASSESSMENT    Smoking Status:  Social History     Tobacco Use   Smoking Status Never   Smokeless Tobacco Never     Alcohol Consumption:  Social History     Substance and Sexual Activity   Alcohol Use Never     Fall Risk Screen:    STEADI Fall Risk Assessment was completed, and patient is at HIGH risk for falls. Assessment completed on:12/4/2023    Depression " Screenin/4/2023    12:00 PM   PHQ-2/PHQ-9 Depression Screening   Little Interest or Pleasure in Doing Things 0-->not at all   Feeling Down, Depressed or Hopeless 1-->several days   PHQ-9: Brief Depression Severity Measure Score 1       Health Habits and Functional and Cognitive Screenin/4/2023    12:00 PM   Functional & Cognitive Status   Do you have difficulty preparing food and eating? No   Do you have difficulty bathing yourself, getting dressed or grooming yourself? No   Do you have difficulty using the toilet? No   Do you have difficulty moving around from place to place? No   Do you have trouble with steps or getting out of a bed or a chair? Yes   Current Diet Well Balanced Diet   Dental Exam Not up to date   Eye Exam Not up to date   Exercise (times per week) 7 times per week   Current Exercises Include Walking   Do you need help using the phone?  No   Are you deaf or do you have serious difficulty hearing?  No   Do you need help to go to places out of walking distance? Yes   Do you need help shopping? No   Do you need help preparing meals?  No   Do you need help with housework?  No   Do you need help with laundry? No   Do you need help taking your medications? No   Do you need help managing money? No   Do you ever drive or ride in a car without wearing a seat belt? No       Age-appropriate Screening Schedule:  Refer to the list below for future screening recommendations based on patient's age, sex and/or medical conditions. Orders for these recommended tests are listed in the plan section. The patient has been provided with a written plan.    Health Maintenance   Topic Date Due    Hepatitis B (1 of 3 - 3-dose series) Never done    COVID-19 Vaccine (1) Never done    Pneumococcal Vaccine 0-64 (1 - PCV) Never done    TDAP/TD VACCINES (1 - Tdap) Never done    URINE MICROALBUMIN  2016    ZOSTER VACCINE (1 of 2) Never done    HEPATITIS C SCREENING  Never done    DIABETIC FOOT EXAM   06/24/2022    DIABETIC EYE EXAM  11/09/2022    HEMOGLOBIN A1C  09/15/2023    ANNUAL WELLNESS VISIT  11/23/2023    LIPID PANEL  11/23/2023    BMI FOLLOWUP  05/23/2024    COLORECTAL CANCER SCREENING  07/13/2032    INFLUENZA VACCINE  Completed                  CMS Preventative Services Quick Reference  Risk Factors Identified During Encounter:    Immunizations Discussed/Encouraged: Influenza    The above risks/problems have been discussed with the patient.  Pertinent information has been shared with the patient in the After Visit Summary.    Diagnoses and all orders for this visit:    1. Medicare annual wellness visit, subsequent (Primary)    2. Type 2 diabetes mellitus with hyperglycemia, without long-term current use of insulin  -     MicroAlbumin, Urine, Random - Urine, Clean Catch; Future  -     atorvastatin (LIPITOR) 40 MG tablet; Take 1 tablet by mouth Every Night.  Dispense: 90 tablet; Refill: 1  -     empagliflozin (Jardiance) 25 MG tablet tablet; Take 1 tablet by mouth Daily.  Dispense: 90 tablet; Refill: 1  -     glipiZIDE-metFORMIN (METAGLIP) 5-500 MG per tablet; Take 1 tablet by mouth 2 (Two) Times a Day Before Meals.  Dispense: 180 tablet; Refill: 1  -     lisinopril (PRINIVIL,ZESTRIL) 5 MG tablet; Take 1 tablet by mouth Daily.  Dispense: 90 tablet; Refill: 1  -     pioglitazone (ACTOS) 45 MG tablet; Take 1 tablet by mouth Daily.  Dispense: 90 tablet; Refill: 1  -     Lipid panel; Future  -     Comprehensive metabolic panel; Future  -     Hemoglobin A1c; Future  -     CBC No Differential; Future  -     TSH; Future  -     MicroAlbumin, Urine, Random - Urine, Clean Catch  -     Lipid panel  -     Comprehensive metabolic panel  -     Hemoglobin A1c  -     CBC No Differential  -     TSH    3. Seasonal allergies  -     loratadine (CLARITIN) 10 MG tablet; Take 1 tablet by mouth Daily.  Dispense: 90 tablet; Refill: 1    4. Anxiety  -     TSH; Future  -     TSH    5. Irritable bowel syndrome with diarrhea  -      dicyclomine (BENTYL) 20 MG tablet; Take 1 tablet by mouth Every 6 (Six) Hours.  Dispense: 360 tablet; Refill: 0    6. Pigmented skin lesion suspicious for malignant neoplasm  -     Ambulatory Referral to Dermatology  On his right temple, there is a approximately 10 mm round melanotic skin lesion, there is some difference in the color, but it does have regular borders, is symmetrical, it is not in a location that I can biopsy here in our clinic.      Follow Up:   Next Medicare Wellness visit to be scheduled in 1 year.      An After Visit Summary and PPPS were made available to the patient.

## 2023-12-05 LAB
ALBUMIN SERPL-MCNC: 4.1 G/DL (ref 3.5–5.2)
ALBUMIN UR-MCNC: 1.8 MG/DL
ALBUMIN/GLOB SERPL: 1.5 G/DL
ALP SERPL-CCNC: 101 U/L (ref 39–117)
ALT SERPL W P-5'-P-CCNC: 41 U/L (ref 1–41)
ANION GAP SERPL CALCULATED.3IONS-SCNC: 10.7 MMOL/L (ref 5–15)
AST SERPL-CCNC: 34 U/L (ref 1–40)
BILIRUB SERPL-MCNC: 0.5 MG/DL (ref 0–1.2)
BUN SERPL-MCNC: 9 MG/DL (ref 6–20)
BUN/CREAT SERPL: 10 (ref 7–25)
CALCIUM SPEC-SCNC: 9.1 MG/DL (ref 8.6–10.5)
CHLORIDE SERPL-SCNC: 99 MMOL/L (ref 98–107)
CHOLEST SERPL-MCNC: 242 MG/DL (ref 0–200)
CO2 SERPL-SCNC: 27.3 MMOL/L (ref 22–29)
CREAT SERPL-MCNC: 0.9 MG/DL (ref 0.76–1.27)
DEPRECATED RDW RBC AUTO: 41.1 FL (ref 37–54)
EGFRCR SERPLBLD CKD-EPI 2021: 102.1 ML/MIN/1.73
ERYTHROCYTE [DISTWIDTH] IN BLOOD BY AUTOMATED COUNT: 12.7 % (ref 12.3–15.4)
GLOBULIN UR ELPH-MCNC: 2.7 GM/DL
GLUCOSE SERPL-MCNC: 142 MG/DL (ref 65–99)
HBA1C MFR BLD: 6.6 % (ref 4.8–5.6)
HCT VFR BLD AUTO: 44.1 % (ref 37.5–51)
HDLC SERPL-MCNC: 30 MG/DL (ref 40–60)
HGB BLD-MCNC: 14.6 G/DL (ref 13–17.7)
LDLC SERPL CALC-MCNC: 177 MG/DL (ref 0–100)
LDLC/HDLC SERPL: 5.83 {RATIO}
MCH RBC QN AUTO: 29.4 PG (ref 26.6–33)
MCHC RBC AUTO-ENTMCNC: 33.1 G/DL (ref 31.5–35.7)
MCV RBC AUTO: 88.9 FL (ref 79–97)
PLATELET # BLD AUTO: 231 10*3/MM3 (ref 140–450)
PMV BLD AUTO: 12.3 FL (ref 6–12)
POTASSIUM SERPL-SCNC: 3.7 MMOL/L (ref 3.5–5.2)
PROT SERPL-MCNC: 6.8 G/DL (ref 6–8.5)
RBC # BLD AUTO: 4.96 10*6/MM3 (ref 4.14–5.8)
SODIUM SERPL-SCNC: 137 MMOL/L (ref 136–145)
TRIGL SERPL-MCNC: 185 MG/DL (ref 0–150)
TSH SERPL DL<=0.05 MIU/L-ACNC: 1.99 UIU/ML (ref 0.27–4.2)
VLDLC SERPL-MCNC: 35 MG/DL (ref 5–40)
WBC NRBC COR # BLD AUTO: 6.76 10*3/MM3 (ref 3.4–10.8)

## 2023-12-07 ENCOUNTER — TELEPHONE (OUTPATIENT)
Dept: FAMILY MEDICINE CLINIC | Facility: CLINIC | Age: 53
End: 2023-12-07
Payer: MEDICARE

## 2023-12-07 NOTE — TELEPHONE ENCOUNTER
----- Message from Everardo Venegas, DO sent at 12/7/2023  8:02 AM EST -----  Blood work looks good.  No concerns.    Called patient, unable to leave message.    HUB TO READ

## 2023-12-20 DIAGNOSIS — F60.1 SCHIZOID PERSONALITY DISORDER IN ADULT: ICD-10-CM

## 2023-12-20 DIAGNOSIS — F41.1 GENERALIZED ANXIETY DISORDER: ICD-10-CM

## 2023-12-21 RX ORDER — BREXPIPRAZOLE 4 MG/1
4 TABLET ORAL DAILY
Qty: 90 TABLET | Refills: 0 | Status: SHIPPED | OUTPATIENT
Start: 2023-12-21

## 2024-03-05 ENCOUNTER — OFFICE VISIT (OUTPATIENT)
Dept: PSYCHIATRY | Facility: CLINIC | Age: 54
End: 2024-03-05
Payer: MEDICARE

## 2024-03-05 VITALS
BODY MASS INDEX: 33.78 KG/M2 | DIASTOLIC BLOOD PRESSURE: 72 MMHG | WEIGHT: 263.2 LBS | SYSTOLIC BLOOD PRESSURE: 120 MMHG | HEIGHT: 74 IN | HEART RATE: 68 BPM | OXYGEN SATURATION: 96 %

## 2024-03-05 DIAGNOSIS — Z79.899 MEDICATION MANAGEMENT: ICD-10-CM

## 2024-03-05 DIAGNOSIS — F60.1 SCHIZOID PERSONALITY DISORDER IN ADULT: Primary | ICD-10-CM

## 2024-03-05 DIAGNOSIS — F41.1 GENERALIZED ANXIETY DISORDER: ICD-10-CM

## 2024-03-05 DIAGNOSIS — F43.10 PTSD (POST-TRAUMATIC STRESS DISORDER): ICD-10-CM

## 2024-03-05 DIAGNOSIS — G47.09 OTHER INSOMNIA: ICD-10-CM

## 2024-03-05 PROCEDURE — 1159F MED LIST DOCD IN RCRD: CPT | Performed by: NURSE PRACTITIONER

## 2024-03-05 PROCEDURE — 1160F RVW MEDS BY RX/DR IN RCRD: CPT | Performed by: NURSE PRACTITIONER

## 2024-03-05 PROCEDURE — 99214 OFFICE O/P EST MOD 30 MIN: CPT | Performed by: NURSE PRACTITIONER

## 2024-03-05 RX ORDER — HYDROXYZINE HYDROCHLORIDE 10 MG/1
10 TABLET, FILM COATED ORAL 2 TIMES DAILY PRN
Qty: 180 TABLET | Refills: 0 | Status: SHIPPED | OUTPATIENT
Start: 2024-03-05

## 2024-03-05 RX ORDER — FLUOXETINE HYDROCHLORIDE 40 MG/1
80 CAPSULE ORAL DAILY
Qty: 180 CAPSULE | Refills: 0 | Status: SHIPPED | OUTPATIENT
Start: 2024-03-05

## 2024-03-05 RX ORDER — TRAZODONE HYDROCHLORIDE 150 MG/1
150 TABLET ORAL NIGHTLY
Qty: 90 TABLET | Refills: 0 | Status: SHIPPED | OUTPATIENT
Start: 2024-03-05

## 2024-03-05 RX ORDER — BREXPIPRAZOLE 4 MG/1
4 TABLET ORAL DAILY
Qty: 90 TABLET | Refills: 0 | Status: SHIPPED | OUTPATIENT
Start: 2024-03-05

## 2024-03-05 NOTE — PROGRESS NOTES
"Subjective   Jamarcus Allison is a 54 y.o. male who presents today for follow up    Chief Complaint: Anxiety, depression    History of Present Illness: Patient presents as follow-up.  Reports \"I have not been doing too good\".  States he has not been able to afford to have brakes replaced on his vehicle and which also needs a battery.  States financially he is not able to pay the amount needed.  Also reports struggling with being able to afford groceries as well as getting a ride to local food pantries.  He is agreeable for referral to case management.  He is unable to rate anxiety or depression on a scale of 0-10 with 10 being the worst.  Reports sleep is fair.  Reports feeling paranoid at times.  He denies SI/HI/AVH.    The following portions of the patient's history were reviewed and updated as appropriate: allergies, current medications, past family history, past medical history, past social history, past surgical history and problem list.      Past Medical History:  Past Medical History:   Diagnosis Date    Arthritis     Chronic pain disorder     Colon polyp     Depression     Diabetes mellitus     Diabetic neuropathy     Elevated cholesterol     GERD (gastroesophageal reflux disease)     Hypertension     IBS (irritable bowel syndrome)     Obsessive-compulsive disorder     Peripheral artery disease     Peripheral neuropathy     Psychiatric illness     Psychosis     PTSD (post-traumatic stress disorder)     Rectal bleeding     Schizoaffective disorder     Schizoid personality        Social History:  Social History     Socioeconomic History    Marital status: Single   Tobacco Use    Smoking status: Never    Smokeless tobacco: Never   Vaping Use    Vaping status: Never Used   Substance and Sexual Activity    Alcohol use: Never    Drug use: Never    Sexual activity: Not Currently     Partners: Female     Birth control/protection: Condom       Family History:  Family History   Problem Relation Age of Onset    Heart " disease Mother     Diabetes Mother     Cancer Father     Alcohol abuse Father     Heart disease Sister     Diabetes Sister     Cancer Sister     Thyroid disease Sister     Depression Sister     Anxiety disorder Sister     ADD / ADHD Sister     Depression Brother     Anxiety disorder Brother     Alcohol abuse Brother     Drug abuse Brother     Paranoid behavior Brother        Past Surgical History:  Past Surgical History:   Procedure Laterality Date    CATARACT EXTRACTION      COLONOSCOPY      COLONOSCOPY N/A 07/13/2022    Procedure: COLONOSCOPY;  Surgeon: Freddy Mortensen MD;  Location: [facility];  Service: Gastroenterology;  Laterality: N/A;    EYE SURGERY      POLYPECTOMY      VASECTOMY         Problem List:  Patient Active Problem List   Diagnosis    Type 2 diabetes mellitus with hyperglycemia, without long-term current use of insulin    Screening for colon cancer    History of colonic polyps    Rectal bleeding       Allergy:   Allergies   Allergen Reactions    Poison Ivy Extract Anaphylaxis    Poison Oak Extract Anaphylaxis    Sumac Anaphylaxis        Current Medications:   Current Outpatient Medications   Medication Sig Dispense Refill    atorvastatin (LIPITOR) 40 MG tablet Take 1 tablet by mouth Every Night. 90 tablet 1    Blood Glucose Monitoring Suppl (B-D LOGIC BLOOD GLUCOSE) w/Device kit 1 Device Daily. 1 each 0    Brexpiprazole (Rexulti) 4 MG tablet Take 1 tablet by mouth Daily. 90 tablet 0    dicyclomine (BENTYL) 20 MG tablet Take 1 tablet by mouth Every 6 (Six) Hours. 360 tablet 0    empagliflozin (Jardiance) 25 MG tablet tablet Take 1 tablet by mouth Daily. 90 tablet 1    FLUoxetine (PROzac) 40 MG capsule Take 2 capsules by mouth Daily. 180 capsule 0    glipiZIDE-metFORMIN (METAGLIP) 5-500 MG per tablet Take 1 tablet by mouth 2 (Two) Times a Day Before Meals. 180 tablet 1    glucose blood test strip Check daily 200 each 12    Hydrocort-Pramoxine, Perianal, (Proctofoam HC) 1-1 % rectal foam Insert  "1 application into the rectum 2 (Two) Times a Day. 1 each 0    hydrOXYzine (ATARAX) 10 MG tablet Take 1 tablet by mouth 2 (Two) Times a Day As Needed for Anxiety. 180 tablet 0    Lancets (freestyle) lancets Check daily fasting 200 each 12    lisinopril (PRINIVIL,ZESTRIL) 5 MG tablet Take 1 tablet by mouth Daily. 90 tablet 1    loratadine (CLARITIN) 10 MG tablet Take 1 tablet by mouth Daily. 90 tablet 1    pioglitazone (ACTOS) 45 MG tablet Take 1 tablet by mouth Daily. 90 tablet 1    traZODone (DESYREL) 150 MG tablet Take 1 tablet by mouth Every Night. 90 tablet 0     No current facility-administered medications for this visit.       Review of Symptoms:    Review of Systems   Constitutional:  Positive for fatigue.   HENT: Negative.     Eyes: Negative.    Respiratory: Negative.     Cardiovascular: Negative.    Gastrointestinal: Negative.    Genitourinary: Negative.    Neurological: Negative.    Psychiatric/Behavioral:  Positive for sleep disturbance and depressed mood. Negative for suicidal ideas. The patient is nervous/anxious.        Objective   Physical Exam:   Blood pressure 120/72, pulse 68, height 188 cm (74.02\"), weight 119 kg (263 lb 3.2 oz), SpO2 96%.  Body mass index is 33.78 kg/m².    Appearance: Well nourished male, appropriately dressed, appears stated age and in no acute distress  Gait, Station, Strength: WNL    Mental Status Exam:   Hygiene:   good  Cooperation:  Cooperative  Eye Contact:  Good  Psychomotor Behavior:  Appropriate  Affect:  Appropriate  Mood: anxious  Hopelessness: Denies  Speech:  Normal  Thought Process:  Linear  Thought Content:  Mood congruent  Suicidal:  None  Homicidal:  None  Hallucinations:  Not demonstrated today  Delusion:  None  Memory:  Intact  Orientation:  Person, Place, Time, and Situation  Reliability:  good  Insight:  Fair  Judgement:  Fair and Impaired  Impulse Control:  Fair  Physical/Medical Issues:   See med hx      PHQ-Score Total:  PHQ-9 Total Score: 8   Patient " screened positive for depression based on a PHQ-9 score of 8 on 3/5/2024. Follow-up recommendations include: Prescribed antidepressant medication treatment and Suicide Risk Assessment performed.        Lab Results:   No visits with results within 1 Month(s) from this visit.   Latest known visit with results is:   Office Visit on 12/04/2023   Component Date Value Ref Range Status    Microalbumin, Urine 12/04/2023 1.8  mg/dL Final    Total Cholesterol 12/04/2023 242 (H)  0 - 200 mg/dL Final    Triglycerides 12/04/2023 185 (H)  0 - 150 mg/dL Final    HDL Cholesterol 12/04/2023 30 (L)  40 - 60 mg/dL Final    LDL Cholesterol  12/04/2023 177 (H)  0 - 100 mg/dL Final    VLDL Cholesterol 12/04/2023 35  5 - 40 mg/dL Final    LDL/HDL Ratio 12/04/2023 5.83   Final    Glucose 12/04/2023 142 (H)  65 - 99 mg/dL Final    BUN 12/04/2023 9  6 - 20 mg/dL Final    Creatinine 12/04/2023 0.90  0.76 - 1.27 mg/dL Final    Sodium 12/04/2023 137  136 - 145 mmol/L Final    Potassium 12/04/2023 3.7  3.5 - 5.2 mmol/L Final    Chloride 12/04/2023 99  98 - 107 mmol/L Final    CO2 12/04/2023 27.3  22.0 - 29.0 mmol/L Final    Calcium 12/04/2023 9.1  8.6 - 10.5 mg/dL Final    Total Protein 12/04/2023 6.8  6.0 - 8.5 g/dL Final    Albumin 12/04/2023 4.1  3.5 - 5.2 g/dL Final    ALT (SGPT) 12/04/2023 41  1 - 41 U/L Final    AST (SGOT) 12/04/2023 34  1 - 40 U/L Final    Alkaline Phosphatase 12/04/2023 101  39 - 117 U/L Final    Total Bilirubin 12/04/2023 0.5  0.0 - 1.2 mg/dL Final    Globulin 12/04/2023 2.7  gm/dL Final    A/G Ratio 12/04/2023 1.5  g/dL Final    BUN/Creatinine Ratio 12/04/2023 10.0  7.0 - 25.0 Final    Anion Gap 12/04/2023 10.7  5.0 - 15.0 mmol/L Final    eGFR 12/04/2023 102.1  >60.0 mL/min/1.73 Final    Hemoglobin A1C 12/04/2023 6.60 (H)  4.80 - 5.60 % Final    WBC 12/04/2023 6.76  3.40 - 10.80 10*3/mm3 Final    RBC 12/04/2023 4.96  4.14 - 5.80 10*6/mm3 Final    Hemoglobin 12/04/2023 14.6  13.0 - 17.7 g/dL Final    Hematocrit  12/04/2023 44.1  37.5 - 51.0 % Final    MCV 12/04/2023 88.9  79.0 - 97.0 fL Final    MCH 12/04/2023 29.4  26.6 - 33.0 pg Final    MCHC 12/04/2023 33.1  31.5 - 35.7 g/dL Final    RDW 12/04/2023 12.7  12.3 - 15.4 % Final    RDW-SD 12/04/2023 41.1  37.0 - 54.0 fl Final    MPV 12/04/2023 12.3 (H)  6.0 - 12.0 fL Final    Platelets 12/04/2023 231  140 - 450 10*3/mm3 Final    TSH 12/04/2023 1.990  0.270 - 4.200 uIU/mL Final       Assessment & Plan   Diagnoses and all orders for this visit:    1. Schizoid personality disorder in adult (Primary)  -     FLUoxetine (PROzac) 40 MG capsule; Take 2 capsules by mouth Daily.  Dispense: 180 capsule; Refill: 0  -     Brexpiprazole (Rexulti) 4 MG tablet; Take 1 tablet by mouth Daily.  Dispense: 90 tablet; Refill: 0    2. Generalized anxiety disorder  -     FLUoxetine (PROzac) 40 MG capsule; Take 2 capsules by mouth Daily.  Dispense: 180 capsule; Refill: 0  -     hydrOXYzine (ATARAX) 10 MG tablet; Take 1 tablet by mouth 2 (Two) Times a Day As Needed for Anxiety.  Dispense: 180 tablet; Refill: 0  -     Brexpiprazole (Rexulti) 4 MG tablet; Take 1 tablet by mouth Daily.  Dispense: 90 tablet; Refill: 0  -     traZODone (DESYREL) 150 MG tablet; Take 1 tablet by mouth Every Night.  Dispense: 90 tablet; Refill: 0    3. Other insomnia  -     traZODone (DESYREL) 150 MG tablet; Take 1 tablet by mouth Every Night.  Dispense: 90 tablet; Refill: 0    4. Medication management    5. PTSD (post-traumatic stress disorder)        -Continue brexpiprazole 4 mg daily for AVH and paranoia. Lengthy discussion with patient on the possible side effects of antipsychotic medications including increased cholesterol, increased blood sugar, and possibility of weight gain.  Also discussed the need to monitor lab work associated with this. The risk of muscle movement disorders with this class of medication was also discussed.  -Continue trazodone 150 mg nightly for sleep  -Continue fluoxetine 80 mg daily for anxiety  and depression  -Continue hydroxyzine 10 mg twice daily as needed for anxiety  -Will order routine labs   -Encouraged patient to begin therapy  -YADIEL reviewed and appropriate. Patient counseled on use of controlled substances.  -The benefits of a healthy diet and exercise were discussed with patient, especially the positive effects they have on mental health. Patient encouraged to consider lifestyle modification regarding  diet and exercise patterns to maximize results of mental health treatment.  -Reviewed previous available documentation  -Reviewed most recent available labs                  Visit Diagnoses:    ICD-10-CM ICD-9-CM   1. Schizoid personality disorder in adult  F60.1 301.20   2. Generalized anxiety disorder  F41.1 300.02   3. Other insomnia  G47.09 780.52   4. Medication management  Z79.899 V58.69   5. PTSD (post-traumatic stress disorder)  F43.10 309.81         TREATMENT PLAN/GOALS: Continue supportive psychotherapy efforts and medications as indicated. Treatment and medication options discussed during today's visit. Patient acknowledged and verbally consented to continue with current treatment plan and was educated on the importance of compliance with treatment and follow-up appointments.    MEDICATION ISSUES:    Discussed medication options and treatment plan of prescribed medication as well as the risks, benefits, and side effects including potential falls, possible impaired driving and metabolic adversities among others. Patient is agreeable to call the office with any worsening of symptoms or onset of side effects. Patient is agreeable to call 911 or go to the nearest ER should he/she begin having SI/HI.     MEDS ORDERED DURING VISIT:  New Medications Ordered This Visit   Medications    FLUoxetine (PROzac) 40 MG capsule     Sig: Take 2 capsules by mouth Daily.     Dispense:  180 capsule     Refill:  0    hydrOXYzine (ATARAX) 10 MG tablet     Sig: Take 1 tablet by mouth 2 (Two) Times a Day As  Needed for Anxiety.     Dispense:  180 tablet     Refill:  0    Brexpiprazole (Rexulti) 4 MG tablet     Sig: Take 1 tablet by mouth Daily.     Dispense:  90 tablet     Refill:  0    traZODone (DESYREL) 150 MG tablet     Sig: Take 1 tablet by mouth Every Night.     Dispense:  90 tablet     Refill:  0       Return in about 3 months (around 6/5/2024), or if symptoms worsen or fail to improve.         Prognosis: Guarded dependent on medication/follow up and treatment plan compliance.  Functionality: pt showing improvements in important areas of daily functioning.     Short-term goals: Patient will adhere to medication regimen and note continued improvement in symptoms over the next 3 months.   Long-term goals: Patient will be adherent to medication management and psychotherapy with continued improvement in symptoms over the next 6 months          This document has been electronically signed by RUPINDER Aguilar   March 7, 2024 15:42 EST    Part of this note may be an electronic transcription/translation of spoken language to printed text using the Dragon Dictation System.

## 2024-06-04 ENCOUNTER — OFFICE VISIT (OUTPATIENT)
Dept: FAMILY MEDICINE CLINIC | Facility: CLINIC | Age: 54
End: 2024-06-04
Payer: MEDICARE

## 2024-06-04 ENCOUNTER — LAB (OUTPATIENT)
Dept: FAMILY MEDICINE CLINIC | Facility: CLINIC | Age: 54
End: 2024-06-04
Payer: MEDICARE

## 2024-06-04 VITALS
OXYGEN SATURATION: 94 % | HEIGHT: 74 IN | BODY MASS INDEX: 33.47 KG/M2 | WEIGHT: 260.8 LBS | HEART RATE: 88 BPM | SYSTOLIC BLOOD PRESSURE: 118 MMHG | TEMPERATURE: 98.4 F | DIASTOLIC BLOOD PRESSURE: 68 MMHG

## 2024-06-04 DIAGNOSIS — E11.65 TYPE 2 DIABETES MELLITUS WITH HYPERGLYCEMIA, WITHOUT LONG-TERM CURRENT USE OF INSULIN: ICD-10-CM

## 2024-06-04 DIAGNOSIS — K58.0 IRRITABLE BOWEL SYNDROME WITH DIARRHEA: ICD-10-CM

## 2024-06-04 DIAGNOSIS — M25.562 PATELLOFEMORAL ARTHRALGIA OF LEFT KNEE: Primary | ICD-10-CM

## 2024-06-04 DIAGNOSIS — G47.09 OTHER INSOMNIA: ICD-10-CM

## 2024-06-04 DIAGNOSIS — F41.1 GENERALIZED ANXIETY DISORDER: ICD-10-CM

## 2024-06-04 DIAGNOSIS — Z79.899 MEDICATION MANAGEMENT: ICD-10-CM

## 2024-06-04 LAB
ALBUMIN SERPL-MCNC: 4 G/DL (ref 3.5–5.2)
ALBUMIN/GLOB SERPL: 1.7 G/DL
ALP SERPL-CCNC: 79 U/L (ref 39–117)
ALT SERPL W P-5'-P-CCNC: 21 U/L (ref 1–41)
ANION GAP SERPL CALCULATED.3IONS-SCNC: 11.7 MMOL/L (ref 5–15)
AST SERPL-CCNC: 23 U/L (ref 1–40)
BILIRUB SERPL-MCNC: 0.4 MG/DL (ref 0–1.2)
BUN SERPL-MCNC: 14 MG/DL (ref 6–20)
BUN/CREAT SERPL: 18.9 (ref 7–25)
CALCIUM SPEC-SCNC: 9.3 MG/DL (ref 8.6–10.5)
CHLORIDE SERPL-SCNC: 106 MMOL/L (ref 98–107)
CHOLEST SERPL-MCNC: 111 MG/DL (ref 0–200)
CO2 SERPL-SCNC: 24.3 MMOL/L (ref 22–29)
CREAT SERPL-MCNC: 0.74 MG/DL (ref 0.76–1.27)
DEPRECATED RDW RBC AUTO: 43.5 FL (ref 37–54)
EGFRCR SERPLBLD CKD-EPI 2021: 107.7 ML/MIN/1.73
ERYTHROCYTE [DISTWIDTH] IN BLOOD BY AUTOMATED COUNT: 13.1 % (ref 12.3–15.4)
GLOBULIN UR ELPH-MCNC: 2.4 GM/DL
GLUCOSE SERPL-MCNC: 67 MG/DL (ref 65–99)
HBA1C MFR BLD: 5.6 % (ref 4.8–5.6)
HCT VFR BLD AUTO: 41.6 % (ref 37.5–51)
HDLC SERPL-MCNC: 32 MG/DL (ref 40–60)
HGB BLD-MCNC: 13.6 G/DL (ref 13–17.7)
LDLC SERPL CALC-MCNC: 62 MG/DL (ref 0–100)
LDLC/HDLC SERPL: 1.95 {RATIO}
MCH RBC QN AUTO: 30.4 PG (ref 26.6–33)
MCHC RBC AUTO-ENTMCNC: 32.7 G/DL (ref 31.5–35.7)
MCV RBC AUTO: 93.1 FL (ref 79–97)
PLATELET # BLD AUTO: 193 10*3/MM3 (ref 140–450)
PMV BLD AUTO: 12.4 FL (ref 6–12)
POTASSIUM SERPL-SCNC: 3.9 MMOL/L (ref 3.5–5.2)
PROT SERPL-MCNC: 6.4 G/DL (ref 6–8.5)
RBC # BLD AUTO: 4.47 10*6/MM3 (ref 4.14–5.8)
SODIUM SERPL-SCNC: 142 MMOL/L (ref 136–145)
T-UPTAKE NFR SERPL: 1.16 TBI (ref 0.8–1.3)
T4 SERPL-MCNC: 7.86 MCG/DL (ref 4.5–11.7)
TRIGL SERPL-MCNC: 83 MG/DL (ref 0–150)
TSH SERPL DL<=0.05 MIU/L-ACNC: 1.78 UIU/ML (ref 0.27–4.2)
VLDLC SERPL-MCNC: 17 MG/DL (ref 5–40)
WBC NRBC COR # BLD AUTO: 4.86 10*3/MM3 (ref 3.4–10.8)

## 2024-06-04 PROCEDURE — 80053 COMPREHEN METABOLIC PANEL: CPT | Performed by: NURSE PRACTITIONER

## 2024-06-04 PROCEDURE — 85027 COMPLETE CBC AUTOMATED: CPT | Performed by: FAMILY MEDICINE

## 2024-06-04 PROCEDURE — 1159F MED LIST DOCD IN RCRD: CPT | Performed by: FAMILY MEDICINE

## 2024-06-04 PROCEDURE — 84436 ASSAY OF TOTAL THYROXINE: CPT | Performed by: NURSE PRACTITIONER

## 2024-06-04 PROCEDURE — 84443 ASSAY THYROID STIM HORMONE: CPT | Performed by: NURSE PRACTITIONER

## 2024-06-04 PROCEDURE — 83036 HEMOGLOBIN GLYCOSYLATED A1C: CPT | Performed by: FAMILY MEDICINE

## 2024-06-04 PROCEDURE — 1125F AMNT PAIN NOTED PAIN PRSNT: CPT | Performed by: FAMILY MEDICINE

## 2024-06-04 PROCEDURE — 80061 LIPID PANEL: CPT | Performed by: NURSE PRACTITIONER

## 2024-06-04 PROCEDURE — 84479 ASSAY OF THYROID (T3 OR T4): CPT | Performed by: NURSE PRACTITIONER

## 2024-06-04 PROCEDURE — G2211 COMPLEX E/M VISIT ADD ON: HCPCS | Performed by: FAMILY MEDICINE

## 2024-06-04 PROCEDURE — 99214 OFFICE O/P EST MOD 30 MIN: CPT | Performed by: FAMILY MEDICINE

## 2024-06-04 PROCEDURE — 1160F RVW MEDS BY RX/DR IN RCRD: CPT | Performed by: FAMILY MEDICINE

## 2024-06-04 RX ORDER — ATORVASTATIN CALCIUM 40 MG/1
40 TABLET, FILM COATED ORAL NIGHTLY
Qty: 90 TABLET | Refills: 1 | Status: SHIPPED | OUTPATIENT
Start: 2024-06-04

## 2024-06-04 RX ORDER — GLIPIZIDE AND METFORMIN HCL 5; 500 MG/1; MG/1
1 TABLET, FILM COATED ORAL
Qty: 180 TABLET | Refills: 1 | Status: SHIPPED | OUTPATIENT
Start: 2024-06-04

## 2024-06-04 RX ORDER — DICYCLOMINE HCL 20 MG
20 TABLET ORAL EVERY 6 HOURS
Qty: 360 TABLET | Refills: 0 | Status: SHIPPED | OUTPATIENT
Start: 2024-06-04

## 2024-06-04 RX ORDER — LISINOPRIL 5 MG/1
5 TABLET ORAL DAILY
Qty: 90 TABLET | Refills: 1 | Status: SHIPPED | OUTPATIENT
Start: 2024-06-04

## 2024-06-04 RX ORDER — TRAZODONE HYDROCHLORIDE 150 MG/1
150 TABLET ORAL NIGHTLY
Qty: 90 TABLET | Refills: 0 | Status: SHIPPED | OUTPATIENT
Start: 2024-06-04

## 2024-06-04 RX ORDER — PIOGLITAZONEHYDROCHLORIDE 45 MG/1
45 TABLET ORAL DAILY
Qty: 90 TABLET | Refills: 1 | Status: SHIPPED | OUTPATIENT
Start: 2024-06-04

## 2024-06-04 NOTE — PROGRESS NOTES
Subjective   Jamarcus Allison is a 54 y.o. male.   Pt presents today with CC of Diabetes      History of Present Illness   History of Present Illness     The following portions of the patient's history were reviewed and updated as appropriate: allergies, current medications, past family history, past medical history, past social history, past surgical history, and problem list.    Review of Systems   Constitutional:  Negative for chills, fever and unexpected weight loss.   HENT:  Negative for congestion and sore throat.    Eyes:  Negative for blurred vision and visual disturbance.   Respiratory:  Negative for cough and wheezing.    Cardiovascular:  Negative for chest pain and palpitations.   Gastrointestinal:  Negative for abdominal pain and diarrhea.   Endocrine: Negative for cold intolerance and heat intolerance.   Genitourinary:  Negative for dysuria.   Musculoskeletal:  Negative for arthralgias and neck stiffness.   Neurological:  Negative for dizziness, seizures and syncope.   Psychiatric/Behavioral:  Negative for self-injury, suicidal ideas and depressed mood.      Vitals:    06/04/24 1103   BP: 118/68   Pulse: 88   Temp: 98.4 °F (36.9 °C)   SpO2: 94%        Objective   Physical Exam  Vitals and nursing note reviewed.   Constitutional:       Appearance: He is well-developed.   HENT:      Head: Normocephalic and atraumatic.      Right Ear: External ear normal.      Left Ear: External ear normal.      Nose: Nose normal.   Eyes:      Conjunctiva/sclera: Conjunctivae normal.      Pupils: Pupils are equal, round, and reactive to light.   Cardiovascular:      Rate and Rhythm: Normal rate and regular rhythm.      Heart sounds: Normal heart sounds.   Pulmonary:      Effort: Pulmonary effort is normal.      Breath sounds: Normal breath sounds.   Abdominal:      General: Bowel sounds are normal.      Palpations: Abdomen is soft.   Musculoskeletal:      Cervical back: Normal range of motion and neck supple.      Comments:  Right knee: Normal\  Left knee: Knee has a full range of motion, but there is subpatellar crepitus with active range of motion.  Positive J sign.     Skin:     General: Skin is warm and dry.   Neurological:      Mental Status: He is alert and oriented to person, place, and time.   Psychiatric:         Behavior: Behavior normal.           Assessment & Plan   Diagnoses and all orders for this visit:    1. Patellofemoral arthralgia of left knee (Primary)  It is my medical opinion that Luis Alfredo would benefit from a patellofemoral knee brace for his left knee.  His patella rides laterally along the groove and this causes crepitus and pain.  A patellofemoral knee brace will likely stabilize the patella, and provide relief while walking.  Walking is his primary source of exercise, someone patellofemoral pain worsens, it limits his exercise tolerance.  2. Type 2 diabetes mellitus with hyperglycemia, without long-term current use of insulin  -     atorvastatin (LIPITOR) 40 MG tablet; Take 1 tablet by mouth Every Night.  Dispense: 90 tablet; Refill: 1  -     empagliflozin (Jardiance) 25 MG tablet tablet; Take 1 tablet by mouth Daily.  Dispense: 90 tablet; Refill: 1  -     glipiZIDE-metFORMIN (METAGLIP) 5-500 MG per tablet; Take 1 tablet by mouth 2 (Two) Times a Day Before Meals.  Dispense: 180 tablet; Refill: 1  -     lisinopril (PRINIVIL,ZESTRIL) 5 MG tablet; Take 1 tablet by mouth Daily.  Dispense: 90 tablet; Refill: 1  -     pioglitazone (ACTOS) 45 MG tablet; Take 1 tablet by mouth Daily.  Dispense: 90 tablet; Refill: 1  -     Comprehensive metabolic panel; Future  -     Hemoglobin A1c; Future  -     CBC No Differential; Future  -     Lipid panel; Future  Getting blood work today.  He has lost few pounds since his last appointment.  3. Irritable bowel syndrome with diarrhea  -     dicyclomine (BENTYL) 20 MG tablet; Take 1 tablet by mouth Every 6 (Six) Hours.  Dispense: 360 tablet; Refill: 0    4. Generalized anxiety  disorder  -     traZODone (DESYREL) 150 MG tablet; Take 1 tablet by mouth Every Night.  Dispense: 90 tablet; Refill: 0    5. Other insomnia  -     traZODone (DESYREL) 150 MG tablet; Take 1 tablet by mouth Every Night.  Dispense: 90 tablet; Refill: 0                  BMI is >= 30 and <35. (Class 1 Obesity). The following options were offered after discussion;: exercise counseling/recommendations and nutrition counseling/recommendations          This document has been electronically signed by Margie Atkins  June 4, 2024 11:05 EDT    Dictated Utilizing Dragon Dictation: Part of this note may be an electronic transcription/translation of spoken language to printed text using the Dragon Dictation System.

## 2024-06-06 DIAGNOSIS — F41.1 GENERALIZED ANXIETY DISORDER: ICD-10-CM

## 2024-06-06 DIAGNOSIS — F60.1 SCHIZOID PERSONALITY DISORDER IN ADULT: ICD-10-CM

## 2024-06-07 ENCOUNTER — TELEPHONE (OUTPATIENT)
Dept: FAMILY MEDICINE CLINIC | Facility: CLINIC | Age: 54
End: 2024-06-07
Payer: MEDICARE

## 2024-06-07 RX ORDER — BREXPIPRAZOLE 4 MG/1
4 TABLET ORAL DAILY
Qty: 90 TABLET | Refills: 0 | Status: SHIPPED | OUTPATIENT
Start: 2024-06-07

## 2024-06-07 NOTE — TELEPHONE ENCOUNTER
----- Message from Everardo Venegas sent at 6/7/2024  4:42 PM EDT -----  No problems on your blood work.  Continue current treatment.

## 2024-07-08 ENCOUNTER — OFFICE VISIT (OUTPATIENT)
Dept: PSYCHIATRY | Facility: CLINIC | Age: 54
End: 2024-07-08
Payer: MEDICARE

## 2024-07-08 VITALS
SYSTOLIC BLOOD PRESSURE: 118 MMHG | DIASTOLIC BLOOD PRESSURE: 74 MMHG | HEART RATE: 79 BPM | HEIGHT: 74 IN | WEIGHT: 256 LBS | BODY MASS INDEX: 32.85 KG/M2

## 2024-07-08 DIAGNOSIS — G47.09 OTHER INSOMNIA: ICD-10-CM

## 2024-07-08 DIAGNOSIS — Z79.899 MEDICATION MANAGEMENT: ICD-10-CM

## 2024-07-08 DIAGNOSIS — F41.1 GENERALIZED ANXIETY DISORDER: ICD-10-CM

## 2024-07-08 DIAGNOSIS — F60.1 SCHIZOID PERSONALITY DISORDER IN ADULT: Primary | ICD-10-CM

## 2024-07-08 PROCEDURE — 1160F RVW MEDS BY RX/DR IN RCRD: CPT | Performed by: NURSE PRACTITIONER

## 2024-07-08 PROCEDURE — 1159F MED LIST DOCD IN RCRD: CPT | Performed by: NURSE PRACTITIONER

## 2024-07-08 PROCEDURE — 99214 OFFICE O/P EST MOD 30 MIN: CPT | Performed by: NURSE PRACTITIONER

## 2024-07-08 RX ORDER — FLUOXETINE HYDROCHLORIDE 40 MG/1
80 CAPSULE ORAL DAILY
Qty: 180 CAPSULE | Refills: 0 | Status: SHIPPED | OUTPATIENT
Start: 2024-07-08

## 2024-07-08 RX ORDER — BREXPIPRAZOLE 4 MG/1
4 TABLET ORAL DAILY
Qty: 90 TABLET | Refills: 0 | Status: SHIPPED | OUTPATIENT
Start: 2024-07-08

## 2024-07-08 RX ORDER — TRAZODONE HYDROCHLORIDE 150 MG/1
150 TABLET ORAL NIGHTLY
Qty: 90 TABLET | Refills: 0 | Status: SHIPPED | OUTPATIENT
Start: 2024-07-08

## 2024-07-08 NOTE — PROGRESS NOTES
"Subjective   Jamarcus Allison is a 54 y.o. male who presents today for follow up    Chief Complaint:  Anxiety    History of Present Illness: Patient presents as follow up. He reports anxiety and depression has been managed well with medications. States he continues to struggle with financial issues as he has not been able to get his vehicle fixed. Reports he has been using [employer] to transport him to appointments. He reports sleep is \"fair\", states he does wake up throughout the night but is able to fall asleep. Reports appetite is good, chart review reflects A1C has dropped to within normal ranges. States he has been walking more. He denies SI/HI/AVH.    The following portions of the patient's history were reviewed and updated as appropriate: allergies, current medications, past family history, past medical history, past social history, past surgical history and problem list.      Past Medical History:  Past Medical History:   Diagnosis Date    Arthritis     Chronic pain disorder     Colon polyp     Depression     Diabetes mellitus     Diabetic neuropathy     Elevated cholesterol     GERD (gastroesophageal reflux disease)     Hypertension     IBS (irritable bowel syndrome)     Obsessive-compulsive disorder     Peripheral artery disease     Peripheral neuropathy     Psychiatric illness     Psychosis     PTSD (post-traumatic stress disorder)     Rectal bleeding     Schizoaffective disorder     Schizoid personality        Social History:  Social History     Socioeconomic History    Marital status: Single   Tobacco Use    Smoking status: Never    Smokeless tobacco: Never   Vaping Use    Vaping status: Never Used   Substance and Sexual Activity    Alcohol use: Never    Drug use: Never    Sexual activity: Not Currently     Partners: Female     Birth control/protection: Condom       Family History:  Family History   Problem Relation Age of Onset    Heart disease Mother     Diabetes Mother     Cancer Father     Alcohol abuse " Father     Heart disease Sister     Diabetes Sister     Cancer Sister     Thyroid disease Sister     Depression Sister     Anxiety disorder Sister     ADD / ADHD Sister     Depression Brother     Anxiety disorder Brother     Alcohol abuse Brother     Drug abuse Brother     Paranoid behavior Brother        Past Surgical History:  Past Surgical History:   Procedure Laterality Date    CATARACT EXTRACTION      COLONOSCOPY      COLONOSCOPY N/A 07/13/2022    Procedure: COLONOSCOPY;  Surgeon: Freddy Mortensen MD;  Location: [facility];  Service: Gastroenterology;  Laterality: N/A;    EYE SURGERY      POLYPECTOMY      VASECTOMY         Problem List:  Patient Active Problem List   Diagnosis    Type 2 diabetes mellitus with hyperglycemia, without long-term current use of insulin    Screening for colon cancer    History of colonic polyps    Rectal bleeding       Allergy:   Allergies   Allergen Reactions    Poison Ivy Extract Anaphylaxis    Poison Oak Extract Anaphylaxis    Sumac Anaphylaxis        Current Medications:   Current Outpatient Medications   Medication Sig Dispense Refill    atorvastatin (LIPITOR) 40 MG tablet Take 1 tablet by mouth Every Night. 90 tablet 1    Blood Glucose Monitoring Suppl (B-D LOGIC BLOOD GLUCOSE) w/Device kit 1 Device Daily. 1 each 0    Brexpiprazole (Rexulti) 4 MG tablet Take 1 tablet by mouth Daily. 90 tablet 0    dicyclomine (BENTYL) 20 MG tablet Take 1 tablet by mouth Every 6 (Six) Hours. 360 tablet 0    empagliflozin (Jardiance) 25 MG tablet tablet Take 1 tablet by mouth Daily. 90 tablet 1    FLUoxetine (PROzac) 40 MG capsule Take 2 capsules by mouth Daily. 180 capsule 0    glipiZIDE-metFORMIN (METAGLIP) 5-500 MG per tablet Take 1 tablet by mouth 2 (Two) Times a Day Before Meals. 180 tablet 1    glucose blood test strip Check daily 200 each 12    Hydrocort-Pramoxine, Perianal, (Proctofoam HC) 1-1 % rectal foam Insert 1 application into the rectum 2 (Two) Times a Day. 1 each 0     "hydrOXYzine (ATARAX) 10 MG tablet Take 1 tablet by mouth 2 (Two) Times a Day As Needed for Anxiety. 180 tablet 0    Lancets (freestyle) lancets Check daily fasting 200 each 12    lisinopril (PRINIVIL,ZESTRIL) 5 MG tablet Take 1 tablet by mouth Daily. 90 tablet 1    pioglitazone (ACTOS) 45 MG tablet Take 1 tablet by mouth Daily. 90 tablet 1    traZODone (DESYREL) 150 MG tablet Take 1 tablet by mouth Every Night. 90 tablet 0     No current facility-administered medications for this visit.       Review of Symptoms:    Review of Systems   Constitutional:  Positive for fatigue.   HENT: Negative.     Eyes: Negative.    Respiratory: Negative.     Cardiovascular: Negative.    Genitourinary: Negative.    Musculoskeletal: Negative.    Skin: Negative.    Neurological: Negative.    Psychiatric/Behavioral:  Positive for sleep disturbance, depressed mood and stress. Negative for suicidal ideas. The patient is nervous/anxious.        Objective   Physical Exam:   Blood pressure 118/74, pulse 79, height 188 cm (74.02\"), weight 116 kg (256 lb).  Body mass index is 32.85 kg/m².    Appearance: Well nourished male, appropriately dressed, appears stated age and in no acute distress  Gait, Station, Strength: WNL    Mental Status Exam:   Hygiene:   good  Cooperation:  Cooperative  Eye Contact:  Good  Psychomotor Behavior:  Appropriate  Affect:  Appropriate  Mood: normal  Hopelessness: 4  Speech:  Minimal  Thought Process:  Linear  Thought Content:  Mood congruent  Suicidal:  None  Homicidal:  None  Hallucinations:  None  Delusion:  None  Memory:  Intact  Orientation:  Person, Place, Time, and Situation  Reliability:  fair  Insight:  Fair  Judgement:  Fair  Impulse Control:  Good  Physical/Medical Issues:   see med hx      PHQ-Score Total:  PHQ-9 Total Score: 17   Patient screened positive for depression based on a PHQ-9 score of 17 on 7/8/2024. Follow-up recommendations include: Prescribed antidepressant medication treatment and Suicide " Risk Assessment performed.        Lab Results:   No visits with results within 1 Month(s) from this visit.   Latest known visit with results is:   Lab on 06/04/2024   Component Date Value Ref Range Status    TSH 06/04/2024 1.780  0.270 - 4.200 uIU/mL Final    T Uptake 06/04/2024 1.16  0.80 - 1.30 TBI Final    T4, Total 06/04/2024 7.86  4.50 - 11.70 mcg/dL Final    T4 results may be falsely increased if patient taking Biotin.    Glucose 06/04/2024 67  65 - 99 mg/dL Final    BUN 06/04/2024 14  6 - 20 mg/dL Final    Creatinine 06/04/2024 0.74 (L)  0.76 - 1.27 mg/dL Final    Sodium 06/04/2024 142  136 - 145 mmol/L Final    Potassium 06/04/2024 3.9  3.5 - 5.2 mmol/L Final    Chloride 06/04/2024 106  98 - 107 mmol/L Final    CO2 06/04/2024 24.3  22.0 - 29.0 mmol/L Final    Calcium 06/04/2024 9.3  8.6 - 10.5 mg/dL Final    Total Protein 06/04/2024 6.4  6.0 - 8.5 g/dL Final    Albumin 06/04/2024 4.0  3.5 - 5.2 g/dL Final    ALT (SGPT) 06/04/2024 21  1 - 41 U/L Final    AST (SGOT) 06/04/2024 23  1 - 40 U/L Final    Alkaline Phosphatase 06/04/2024 79  39 - 117 U/L Final    Total Bilirubin 06/04/2024 0.4  0.0 - 1.2 mg/dL Final    Globulin 06/04/2024 2.4  gm/dL Final    A/G Ratio 06/04/2024 1.7  g/dL Final    BUN/Creatinine Ratio 06/04/2024 18.9  7.0 - 25.0 Final    Anion Gap 06/04/2024 11.7  5.0 - 15.0 mmol/L Final    eGFR 06/04/2024 107.7  >60.0 mL/min/1.73 Final    Hemoglobin A1C 06/04/2024 5.60  4.80 - 5.60 % Final    WBC 06/04/2024 4.86  3.40 - 10.80 10*3/mm3 Final    RBC 06/04/2024 4.47  4.14 - 5.80 10*6/mm3 Final    Hemoglobin 06/04/2024 13.6  13.0 - 17.7 g/dL Final    Hematocrit 06/04/2024 41.6  37.5 - 51.0 % Final    MCV 06/04/2024 93.1  79.0 - 97.0 fL Final    MCH 06/04/2024 30.4  26.6 - 33.0 pg Final    MCHC 06/04/2024 32.7  31.5 - 35.7 g/dL Final    RDW 06/04/2024 13.1  12.3 - 15.4 % Final    RDW-SD 06/04/2024 43.5  37.0 - 54.0 fl Final    MPV 06/04/2024 12.4 (H)  6.0 - 12.0 fL Final    Platelets 06/04/2024 193  140  - 450 10*3/mm3 Final    Total Cholesterol 06/04/2024 111  0 - 200 mg/dL Final    Triglycerides 06/04/2024 83  0 - 150 mg/dL Final    HDL Cholesterol 06/04/2024 32 (L)  40 - 60 mg/dL Final    LDL Cholesterol  06/04/2024 62  0 - 100 mg/dL Final    VLDL Cholesterol 06/04/2024 17  5 - 40 mg/dL Final    LDL/HDL Ratio 06/04/2024 1.95   Final       Assessment & Plan   Diagnoses and all orders for this visit:    1. Schizoid personality disorder in adult (Primary)  -     FLUoxetine (PROzac) 40 MG capsule; Take 2 capsules by mouth Daily.  Dispense: 180 capsule; Refill: 0  -     Brexpiprazole (Rexulti) 4 MG tablet; Take 1 tablet by mouth Daily.  Dispense: 90 tablet; Refill: 0    2. Generalized anxiety disorder  -     FLUoxetine (PROzac) 40 MG capsule; Take 2 capsules by mouth Daily.  Dispense: 180 capsule; Refill: 0  -     Brexpiprazole (Rexulti) 4 MG tablet; Take 1 tablet by mouth Daily.  Dispense: 90 tablet; Refill: 0  -     traZODone (DESYREL) 150 MG tablet; Take 1 tablet by mouth Every Night.  Dispense: 90 tablet; Refill: 0    3. Other insomnia  -     traZODone (DESYREL) 150 MG tablet; Take 1 tablet by mouth Every Night.  Dispense: 90 tablet; Refill: 0    4. Medication management        -Continue brexpiprazole 4 mg daily for AVH and paranoia. Lengthy discussion with patient on the possible side effects of antipsychotic medications including increased cholesterol, increased blood sugar, and possibility of weight gain.  Also discussed the need to monitor lab work associated with this. The risk of muscle movement disorders with this class of medication was also discussed.  -Continue trazodone 150 mg nightly for sleep  -Continue fluoxetine 80 mg daily for anxiety and depression  -Continue hydroxyzine 10 mg twice daily as needed for anxiety  -Will order routine labs   -Encouraged patient to begin therapy  -YADIEL reviewed and appropriate. Patient counseled on use of controlled substances.  -The benefits of a healthy diet and  exercise were discussed with patient, especially the positive effects they have on mental health. Patient encouraged to consider lifestyle modification regarding  diet and exercise patterns to maximize results of mental health treatment.  -Reviewed previous available documentation  -Reviewed most recent available labs                  Visit Diagnoses:    ICD-10-CM ICD-9-CM   1. Schizoid personality disorder in adult  F60.1 301.20   2. Generalized anxiety disorder  F41.1 300.02   3. Other insomnia  G47.09 780.52   4. Medication management  Z79.899 V58.69         TREATMENT PLAN/GOALS: Continue supportive psychotherapy efforts and medications as indicated. Treatment and medication options discussed during today's visit. Patient acknowledged and verbally consented to continue with current treatment plan and was educated on the importance of compliance with treatment and follow-up appointments.    MEDICATION ISSUES:    Discussed medication options and treatment plan of prescribed medication as well as the risks, benefits, and side effects including potential falls, possible impaired driving and metabolic adversities among others. Patient is agreeable to call the office with any worsening of symptoms or onset of side effects. Patient is agreeable to call 911 or go to the nearest ER should he/she begin having SI/HI.     MEDS ORDERED DURING VISIT:  New Medications Ordered This Visit   Medications    FLUoxetine (PROzac) 40 MG capsule     Sig: Take 2 capsules by mouth Daily.     Dispense:  180 capsule     Refill:  0    Brexpiprazole (Rexulti) 4 MG tablet     Sig: Take 1 tablet by mouth Daily.     Dispense:  90 tablet     Refill:  0    traZODone (DESYREL) 150 MG tablet     Sig: Take 1 tablet by mouth Every Night.     Dispense:  90 tablet     Refill:  0       Return in about 3 months (around 10/8/2024), or if symptoms worsen or fail to improve.         Prognosis: Guarded dependent on medication/follow up and treatment plan  compliance.  Functionality: pt showing improvements in important areas of daily functioning.     Short-term goals: Patient will adhere to medication regimen and note continued improvement in symptoms over the next 3 months.   Long-term goals: Patient will be adherent to medication management and psychotherapy with continued improvement in symptoms over the next 6 months          This document has been electronically signed by RUPINDER Aguilar   July 8, 2024 14:08 EDT    Part of this note may be an electronic transcription/translation of spoken language to printed text using the Dragon Dictation System.

## 2024-11-01 DIAGNOSIS — K58.0 IRRITABLE BOWEL SYNDROME WITH DIARRHEA: ICD-10-CM

## 2024-11-01 DIAGNOSIS — F60.1 SCHIZOID PERSONALITY DISORDER IN ADULT: ICD-10-CM

## 2024-11-01 DIAGNOSIS — F41.1 GENERALIZED ANXIETY DISORDER: ICD-10-CM

## 2024-11-04 RX ORDER — DICYCLOMINE HCL 20 MG
TABLET ORAL
Qty: 360 TABLET | Refills: 0 | Status: SHIPPED | OUTPATIENT
Start: 2024-11-04

## 2024-11-13 ENCOUNTER — OFFICE VISIT (OUTPATIENT)
Dept: PSYCHIATRY | Facility: CLINIC | Age: 54
End: 2024-11-13
Payer: MEDICARE

## 2024-11-13 VITALS
WEIGHT: 268 LBS | DIASTOLIC BLOOD PRESSURE: 82 MMHG | HEART RATE: 66 BPM | HEIGHT: 74 IN | SYSTOLIC BLOOD PRESSURE: 151 MMHG | BODY MASS INDEX: 34.39 KG/M2

## 2024-11-13 DIAGNOSIS — Z79.899 MEDICATION MANAGEMENT: ICD-10-CM

## 2024-11-13 DIAGNOSIS — F60.1 SCHIZOID PERSONALITY DISORDER IN ADULT: Primary | ICD-10-CM

## 2024-11-13 DIAGNOSIS — F41.1 GENERALIZED ANXIETY DISORDER: ICD-10-CM

## 2024-11-13 DIAGNOSIS — F43.10 PTSD (POST-TRAUMATIC STRESS DISORDER): ICD-10-CM

## 2024-11-13 DIAGNOSIS — G47.09 OTHER INSOMNIA: ICD-10-CM

## 2024-11-13 PROCEDURE — 1159F MED LIST DOCD IN RCRD: CPT | Performed by: NURSE PRACTITIONER

## 2024-11-13 PROCEDURE — 99214 OFFICE O/P EST MOD 30 MIN: CPT | Performed by: NURSE PRACTITIONER

## 2024-11-13 PROCEDURE — 1160F RVW MEDS BY RX/DR IN RCRD: CPT | Performed by: NURSE PRACTITIONER

## 2024-11-13 RX ORDER — FLUOXETINE 40 MG/1
80 CAPSULE ORAL DAILY
Qty: 180 CAPSULE | Refills: 0 | Status: SHIPPED | OUTPATIENT
Start: 2024-11-13

## 2024-11-13 RX ORDER — HYDROXYZINE HYDROCHLORIDE 10 MG/1
10 TABLET, FILM COATED ORAL 2 TIMES DAILY PRN
Qty: 180 TABLET | Refills: 0 | Status: SHIPPED | OUTPATIENT
Start: 2024-11-13

## 2024-11-13 RX ORDER — TRAZODONE HYDROCHLORIDE 150 MG/1
150 TABLET ORAL NIGHTLY
Qty: 90 TABLET | Refills: 0 | Status: SHIPPED | OUTPATIENT
Start: 2024-11-13

## 2024-11-13 RX ORDER — BREXPIPRAZOLE 4 MG/1
4 TABLET ORAL DAILY
Qty: 90 TABLET | Refills: 0 | Status: SHIPPED | OUTPATIENT
Start: 2024-11-13

## 2024-11-13 RX ORDER — FLUOXETINE 40 MG/1
80 CAPSULE ORAL DAILY
Qty: 180 CAPSULE | Refills: 0 | OUTPATIENT
Start: 2024-11-13

## 2024-11-13 NOTE — PROGRESS NOTES
Subjective   Jamarcus Allison is a 54 y.o. male who presents today for follow up    Chief Complaint:  Anxiety    History of Present Illness: Patient presents as follow up. Reports he has been out of fluoxetine for almost 4 weeks and has been feeling more depressed recently. States he also has situational issues such as not having his vehicle and not having the funds to fix it as the cost is 6 thousand dollars. Reports he is not going to the food bank as often since he does not have transportation. States his sister is helping by buying groceries.   He reports sleep is good. Reports appetite is good. Denies any paranoia. He denies SI/HI/AVH.    The following portions of the patient's history were reviewed and updated as appropriate: allergies, current medications, past family history, past medical history, past social history, past surgical history and problem list.      Past Medical History:  Past Medical History:   Diagnosis Date    Arthritis     Chronic pain disorder     Colon polyp     Depression     Diabetes mellitus     Diabetic neuropathy     Elevated cholesterol     GERD (gastroesophageal reflux disease)     Hypertension     IBS (irritable bowel syndrome)     Obsessive-compulsive disorder     Peripheral artery disease     Peripheral neuropathy     Psychiatric illness     Psychosis     PTSD (post-traumatic stress disorder)     Rectal bleeding     Schizoaffective disorder     Schizoid personality        Social History:  Social History     Socioeconomic History    Marital status: Single   Tobacco Use    Smoking status: Never    Smokeless tobacco: Never   Vaping Use    Vaping status: Never Used   Substance and Sexual Activity    Alcohol use: Never    Drug use: Never    Sexual activity: Not Currently     Partners: Female     Birth control/protection: Condom       Family History:  Family History   Problem Relation Age of Onset    Heart disease Mother     Diabetes Mother     Cancer Father     Alcohol abuse Father      Heart disease Sister     Diabetes Sister     Cancer Sister     Thyroid disease Sister     Depression Sister     Anxiety disorder Sister     ADD / ADHD Sister     Depression Brother     Anxiety disorder Brother     Alcohol abuse Brother     Drug abuse Brother     Paranoid behavior Brother        Past Surgical History:  Past Surgical History:   Procedure Laterality Date    CATARACT EXTRACTION      COLONOSCOPY      COLONOSCOPY N/A 07/13/2022    Procedure: COLONOSCOPY;  Surgeon: Freddy Mortensen MD;  Location: [facility];  Service: Gastroenterology;  Laterality: N/A;    EYE SURGERY      POLYPECTOMY      VASECTOMY         Problem List:  Patient Active Problem List   Diagnosis    Type 2 diabetes mellitus with hyperglycemia, without long-term current use of insulin    Screening for colon cancer    History of colonic polyps    Rectal bleeding       Allergy:   Allergies   Allergen Reactions    Poison Ivy Extract Anaphylaxis    Poison Oak Extract Anaphylaxis    Sumac Anaphylaxis        Current Medications:   Current Outpatient Medications   Medication Sig Dispense Refill    atorvastatin (LIPITOR) 40 MG tablet Take 1 tablet by mouth Every Night. 90 tablet 1    Blood Glucose Monitoring Suppl (B-D LOGIC BLOOD GLUCOSE) w/Device kit 1 Device Daily. 1 each 0    Brexpiprazole (Rexulti) 4 MG tablet Take 1 tablet by mouth Daily. 90 tablet 0    dicyclomine (BENTYL) 20 MG tablet TAKE 1 TABLET BY MOUTH EVERY 6 HOURS(THIS IS A DOSE INCREASE FROM 10MG TO 20MG) 360 tablet 0    empagliflozin (Jardiance) 25 MG tablet tablet Take 1 tablet by mouth Daily. 90 tablet 1    FLUoxetine (PROzac) 40 MG capsule Take 2 capsules by mouth Daily. 180 capsule 0    glipiZIDE-metFORMIN (METAGLIP) 5-500 MG per tablet Take 1 tablet by mouth 2 (Two) Times a Day Before Meals. 180 tablet 1    glucose blood test strip Check daily 200 each 12    Hydrocort-Pramoxine, Perianal, (Proctofoam HC) 1-1 % rectal foam Insert 1 application into the rectum 2 (Two) Times a  "Day. 1 each 0    hydrOXYzine (ATARAX) 10 MG tablet Take 1 tablet by mouth 2 (Two) Times a Day As Needed for Anxiety. 180 tablet 0    Lancets (freestyle) lancets Check daily fasting 200 each 12    lisinopril (PRINIVIL,ZESTRIL) 5 MG tablet Take 1 tablet by mouth Daily. 90 tablet 1    pioglitazone (ACTOS) 45 MG tablet Take 1 tablet by mouth Daily. 90 tablet 1    traZODone (DESYREL) 150 MG tablet Take 1 tablet by mouth Every Night. 90 tablet 0     No current facility-administered medications for this visit.       Review of Symptoms:    Review of Systems   Constitutional: Negative.    HENT: Negative.     Eyes: Negative.    Respiratory: Negative.     Cardiovascular: Negative.    Gastrointestinal: Negative.    Genitourinary: Negative.    Neurological: Negative.    Psychiatric/Behavioral:  Positive for sleep disturbance, depressed mood and stress. Negative for suicidal ideas. The patient is nervous/anxious.        Objective   Physical Exam:   Blood pressure 151/82, pulse 66, height 188 cm (74.02\"), weight 122 kg (268 lb).  Body mass index is 34.39 kg/m².    Appearance: Well nourished male, appropriately dressed, appears stated age and in no acute distress  Gait, Station, Strength: WNL    Mental Status Exam:   Hygiene:   good  Cooperation:  Cooperative  Eye Contact:  Good  Psychomotor Behavior:  Appropriate  Affect:  Appropriate  Mood: depressed and anxious  Hopelessness: 5  Speech:  Normal  Thought Process:  Linear  Thought Content:  Mood congruent  Suicidal:  None  Homicidal:  None  Hallucinations:  None  Delusion:  None  Memory:  Intact  Orientation:  Person, Place, Time, and Situation  Reliability:  good  Insight:  Fair  Judgement:  Fair  Impulse Control:  Good  Physical/Medical Issues:   see med hx      PHQ-Score Total:  PHQ-9 Total Score: 7   Patient screened positive for depression based on a PHQ-9 score of 7 on 11/13/2024. Follow-up recommendations include: Prescribed antidepressant medication treatment and Suicide " Risk Assessment performed.        Lab Results:   No visits with results within 1 Month(s) from this visit.   Latest known visit with results is:   Lab on 06/04/2024   Component Date Value Ref Range Status    TSH 06/04/2024 1.780  0.270 - 4.200 uIU/mL Final    T Uptake 06/04/2024 1.16  0.80 - 1.30 TBI Final    T4, Total 06/04/2024 7.86  4.50 - 11.70 mcg/dL Final    T4 results may be falsely increased if patient taking Biotin.    Glucose 06/04/2024 67  65 - 99 mg/dL Final    BUN 06/04/2024 14  6 - 20 mg/dL Final    Creatinine 06/04/2024 0.74 (L)  0.76 - 1.27 mg/dL Final    Sodium 06/04/2024 142  136 - 145 mmol/L Final    Potassium 06/04/2024 3.9  3.5 - 5.2 mmol/L Final    Chloride 06/04/2024 106  98 - 107 mmol/L Final    CO2 06/04/2024 24.3  22.0 - 29.0 mmol/L Final    Calcium 06/04/2024 9.3  8.6 - 10.5 mg/dL Final    Total Protein 06/04/2024 6.4  6.0 - 8.5 g/dL Final    Albumin 06/04/2024 4.0  3.5 - 5.2 g/dL Final    ALT (SGPT) 06/04/2024 21  1 - 41 U/L Final    AST (SGOT) 06/04/2024 23  1 - 40 U/L Final    Alkaline Phosphatase 06/04/2024 79  39 - 117 U/L Final    Total Bilirubin 06/04/2024 0.4  0.0 - 1.2 mg/dL Final    Globulin 06/04/2024 2.4  gm/dL Final    A/G Ratio 06/04/2024 1.7  g/dL Final    BUN/Creatinine Ratio 06/04/2024 18.9  7.0 - 25.0 Final    Anion Gap 06/04/2024 11.7  5.0 - 15.0 mmol/L Final    eGFR 06/04/2024 107.7  >60.0 mL/min/1.73 Final    Hemoglobin A1C 06/04/2024 5.60  4.80 - 5.60 % Final    WBC 06/04/2024 4.86  3.40 - 10.80 10*3/mm3 Final    RBC 06/04/2024 4.47  4.14 - 5.80 10*6/mm3 Final    Hemoglobin 06/04/2024 13.6  13.0 - 17.7 g/dL Final    Hematocrit 06/04/2024 41.6  37.5 - 51.0 % Final    MCV 06/04/2024 93.1  79.0 - 97.0 fL Final    MCH 06/04/2024 30.4  26.6 - 33.0 pg Final    MCHC 06/04/2024 32.7  31.5 - 35.7 g/dL Final    RDW 06/04/2024 13.1  12.3 - 15.4 % Final    RDW-SD 06/04/2024 43.5  37.0 - 54.0 fl Final    MPV 06/04/2024 12.4 (H)  6.0 - 12.0 fL Final    Platelets 06/04/2024 193  140  - 450 10*3/mm3 Final    Total Cholesterol 06/04/2024 111  0 - 200 mg/dL Final    Triglycerides 06/04/2024 83  0 - 150 mg/dL Final    HDL Cholesterol 06/04/2024 32 (L)  40 - 60 mg/dL Final    LDL Cholesterol  06/04/2024 62  0 - 100 mg/dL Final    VLDL Cholesterol 06/04/2024 17  5 - 40 mg/dL Final    LDL/HDL Ratio 06/04/2024 1.95   Final       Assessment & Plan   Diagnoses and all orders for this visit:    1. Schizoid personality disorder in adult (Primary)  -     Brexpiprazole (Rexulti) 4 MG tablet; Take 1 tablet by mouth Daily.  Dispense: 90 tablet; Refill: 0  -     FLUoxetine (PROzac) 40 MG capsule; Take 2 capsules by mouth Daily.  Dispense: 180 capsule; Refill: 0    2. Generalized anxiety disorder  -     Brexpiprazole (Rexulti) 4 MG tablet; Take 1 tablet by mouth Daily.  Dispense: 90 tablet; Refill: 0  -     FLUoxetine (PROzac) 40 MG capsule; Take 2 capsules by mouth Daily.  Dispense: 180 capsule; Refill: 0  -     hydrOXYzine (ATARAX) 10 MG tablet; Take 1 tablet by mouth 2 (Two) Times a Day As Needed for Anxiety.  Dispense: 180 tablet; Refill: 0  -     traZODone (DESYREL) 150 MG tablet; Take 1 tablet by mouth Every Night.  Dispense: 90 tablet; Refill: 0    3. Other insomnia  -     traZODone (DESYREL) 150 MG tablet; Take 1 tablet by mouth Every Night.  Dispense: 90 tablet; Refill: 0    4. Medication management    5. PTSD (post-traumatic stress disorder)        -Continue brexpiprazole 4 mg daily for AVH and paranoia. Lengthy discussion with patient on the possible side effects of antipsychotic medications including increased cholesterol, increased blood sugar, and possibility of weight gain.  Also discussed the need to monitor lab work associated with this. The risk of muscle movement disorders with this class of medication was also discussed.  -Continue trazodone 150 mg nightly for sleep  -Restart fluoxetine 400 mg daily for 10 days then increase to 80 mg daily for anxiety and depression  -Continue hydroxyzine 10  mg twice daily as needed for anxiety   -Encouraged patient to begin therapy  -YADIEL reviewed and appropriate. Patient counseled on use of controlled substances.  -The benefits of a healthy diet and exercise were discussed with patient, especially the positive effects they have on mental health. Patient encouraged to consider lifestyle modification regarding  diet and exercise patterns to maximize results of mental health treatment.  -Reviewed previous available documentation  -Reviewed most recent available labs                  Visit Diagnoses:    ICD-10-CM ICD-9-CM   1. Schizoid personality disorder in adult  F60.1 301.20   2. Generalized anxiety disorder  F41.1 300.02   3. Other insomnia  G47.09 780.52   4. Medication management  Z79.899 V58.69   5. PTSD (post-traumatic stress disorder)  F43.10 309.81         TREATMENT PLAN/GOALS: Continue supportive psychotherapy efforts and medications as indicated. Treatment and medication options discussed during today's visit. Patient acknowledged and verbally consented to continue with current treatment plan and was educated on the importance of compliance with treatment and follow-up appointments.    MEDICATION ISSUES:    Discussed medication options and treatment plan of prescribed medication as well as the risks, benefits, and side effects including potential falls, possible impaired driving and metabolic adversities among others. Patient is agreeable to call the office with any worsening of symptoms or onset of side effects. Patient is agreeable to call 911 or go to the nearest ER should he/she begin having SI/HI.     MEDS ORDERED DURING VISIT:  New Medications Ordered This Visit   Medications    Brexpiprazole (Rexulti) 4 MG tablet     Sig: Take 1 tablet by mouth Daily.     Dispense:  90 tablet     Refill:  0    FLUoxetine (PROzac) 40 MG capsule     Sig: Take 2 capsules by mouth Daily.     Dispense:  180 capsule     Refill:  0    hydrOXYzine (ATARAX) 10 MG tablet      Sig: Take 1 tablet by mouth 2 (Two) Times a Day As Needed for Anxiety.     Dispense:  180 tablet     Refill:  0    traZODone (DESYREL) 150 MG tablet     Sig: Take 1 tablet by mouth Every Night.     Dispense:  90 tablet     Refill:  0       Return in about 3 months (around 2/13/2025), or if symptoms worsen or fail to improve.         Prognosis: Guarded dependent on medication/follow up and treatment plan compliance.  Functionality: pt showing improvements in important areas of daily functioning.     Short-term goals: Patient will adhere to medication regimen and note continued improvement in symptoms over the next 3 months.   Long-term goals: Patient will be adherent to medication management and psychotherapy with continued improvement in symptoms over the next 6 months          This document has been electronically signed by RUPINDER Aguilar   November 13, 2024 13:21 EST    Part of this note may be an electronic transcription/translation of spoken language to printed text using the Dragon Dictation System.   no

## 2025-01-03 ENCOUNTER — LAB (OUTPATIENT)
Dept: FAMILY MEDICINE CLINIC | Facility: CLINIC | Age: 55
End: 2025-01-03
Payer: MEDICARE

## 2025-01-03 ENCOUNTER — OFFICE VISIT (OUTPATIENT)
Dept: FAMILY MEDICINE CLINIC | Facility: CLINIC | Age: 55
End: 2025-01-03
Payer: MEDICARE

## 2025-01-03 VITALS
SYSTOLIC BLOOD PRESSURE: 122 MMHG | WEIGHT: 262 LBS | HEIGHT: 74 IN | TEMPERATURE: 98.7 F | DIASTOLIC BLOOD PRESSURE: 78 MMHG | OXYGEN SATURATION: 97 % | BODY MASS INDEX: 33.62 KG/M2 | HEART RATE: 81 BPM

## 2025-01-03 DIAGNOSIS — E11.65 TYPE 2 DIABETES MELLITUS WITH HYPERGLYCEMIA, WITHOUT LONG-TERM CURRENT USE OF INSULIN: Primary | ICD-10-CM

## 2025-01-03 DIAGNOSIS — F41.1 GENERALIZED ANXIETY DISORDER: ICD-10-CM

## 2025-01-03 DIAGNOSIS — E11.65 TYPE 2 DIABETES MELLITUS WITH HYPERGLYCEMIA, WITHOUT LONG-TERM CURRENT USE OF INSULIN: ICD-10-CM

## 2025-01-03 DIAGNOSIS — Z00.00 MEDICARE ANNUAL WELLNESS VISIT, SUBSEQUENT: ICD-10-CM

## 2025-01-03 DIAGNOSIS — G47.09 OTHER INSOMNIA: ICD-10-CM

## 2025-01-03 DIAGNOSIS — K58.0 IRRITABLE BOWEL SYNDROME WITH DIARRHEA: ICD-10-CM

## 2025-01-03 PROCEDURE — 36415 COLL VENOUS BLD VENIPUNCTURE: CPT

## 2025-01-03 PROCEDURE — 1159F MED LIST DOCD IN RCRD: CPT | Performed by: FAMILY MEDICINE

## 2025-01-03 PROCEDURE — G0439 PPPS, SUBSEQ VISIT: HCPCS | Performed by: FAMILY MEDICINE

## 2025-01-03 PROCEDURE — 80053 COMPREHEN METABOLIC PANEL: CPT | Performed by: FAMILY MEDICINE

## 2025-01-03 PROCEDURE — 83036 HEMOGLOBIN GLYCOSYLATED A1C: CPT | Performed by: FAMILY MEDICINE

## 2025-01-03 PROCEDURE — 85027 COMPLETE CBC AUTOMATED: CPT | Performed by: FAMILY MEDICINE

## 2025-01-03 PROCEDURE — 3044F HG A1C LEVEL LT 7.0%: CPT | Performed by: FAMILY MEDICINE

## 2025-01-03 PROCEDURE — 1160F RVW MEDS BY RX/DR IN RCRD: CPT | Performed by: FAMILY MEDICINE

## 2025-01-03 PROCEDURE — 80061 LIPID PANEL: CPT | Performed by: FAMILY MEDICINE

## 2025-01-03 PROCEDURE — 84443 ASSAY THYROID STIM HORMONE: CPT | Performed by: FAMILY MEDICINE

## 2025-01-03 PROCEDURE — 99213 OFFICE O/P EST LOW 20 MIN: CPT | Performed by: FAMILY MEDICINE

## 2025-01-03 PROCEDURE — 1126F AMNT PAIN NOTED NONE PRSNT: CPT | Performed by: FAMILY MEDICINE

## 2025-01-03 RX ORDER — LISINOPRIL 5 MG/1
5 TABLET ORAL DAILY
Qty: 90 TABLET | Refills: 1 | Status: SHIPPED | OUTPATIENT
Start: 2025-01-03

## 2025-01-03 RX ORDER — GLIPIZIDE AND METFORMIN HCL 5; 500 MG/1; MG/1
1 TABLET, FILM COATED ORAL
Qty: 180 TABLET | Refills: 1 | Status: SHIPPED | OUTPATIENT
Start: 2025-01-03

## 2025-01-03 RX ORDER — DICYCLOMINE HCL 20 MG
20 TABLET ORAL EVERY 6 HOURS
Qty: 360 TABLET | Refills: 1 | Status: SHIPPED | OUTPATIENT
Start: 2025-01-03

## 2025-01-03 RX ORDER — ATORVASTATIN CALCIUM 40 MG/1
40 TABLET, FILM COATED ORAL NIGHTLY
Qty: 90 TABLET | Refills: 1 | Status: SHIPPED | OUTPATIENT
Start: 2025-01-03

## 2025-01-03 RX ORDER — PIOGLITAZONE 45 MG/1
45 TABLET ORAL DAILY
Qty: 90 TABLET | Refills: 1 | Status: SHIPPED | OUTPATIENT
Start: 2025-01-03

## 2025-01-04 LAB
ALBUMIN SERPL-MCNC: 3.9 G/DL (ref 3.5–5.2)
ALBUMIN/GLOB SERPL: 1.4 G/DL
ALP SERPL-CCNC: 109 U/L (ref 39–117)
ALT SERPL W P-5'-P-CCNC: 38 U/L (ref 1–41)
ANION GAP SERPL CALCULATED.3IONS-SCNC: 12 MMOL/L (ref 5–15)
AST SERPL-CCNC: 29 U/L (ref 1–40)
BILIRUB SERPL-MCNC: 0.4 MG/DL (ref 0–1.2)
BUN SERPL-MCNC: 9 MG/DL (ref 6–20)
BUN/CREAT SERPL: 12.2 (ref 7–25)
CALCIUM SPEC-SCNC: 8.9 MG/DL (ref 8.6–10.5)
CHLORIDE SERPL-SCNC: 104 MMOL/L (ref 98–107)
CHOLEST SERPL-MCNC: 110 MG/DL (ref 0–200)
CO2 SERPL-SCNC: 28 MMOL/L (ref 22–29)
CREAT SERPL-MCNC: 0.74 MG/DL (ref 0.76–1.27)
DEPRECATED RDW RBC AUTO: 40.3 FL (ref 37–54)
EGFRCR SERPLBLD CKD-EPI 2021: 107.7 ML/MIN/1.73
ERYTHROCYTE [DISTWIDTH] IN BLOOD BY AUTOMATED COUNT: 12.3 % (ref 12.3–15.4)
GLOBULIN UR ELPH-MCNC: 2.8 GM/DL
GLUCOSE SERPL-MCNC: 78 MG/DL (ref 65–99)
HBA1C MFR BLD: 5.6 % (ref 4.8–5.6)
HCT VFR BLD AUTO: 43.3 % (ref 37.5–51)
HDLC SERPL-MCNC: 32 MG/DL (ref 40–60)
HGB BLD-MCNC: 14.2 G/DL (ref 13–17.7)
LDLC SERPL CALC-MCNC: 61 MG/DL (ref 0–100)
LDLC/HDLC SERPL: 1.89 {RATIO}
MCH RBC QN AUTO: 29.6 PG (ref 26.6–33)
MCHC RBC AUTO-ENTMCNC: 32.8 G/DL (ref 31.5–35.7)
MCV RBC AUTO: 90.2 FL (ref 79–97)
PLATELET # BLD AUTO: 241 10*3/MM3 (ref 140–450)
PMV BLD AUTO: 12.5 FL (ref 6–12)
POTASSIUM SERPL-SCNC: 3.7 MMOL/L (ref 3.5–5.2)
PROT SERPL-MCNC: 6.7 G/DL (ref 6–8.5)
RBC # BLD AUTO: 4.8 10*6/MM3 (ref 4.14–5.8)
SODIUM SERPL-SCNC: 144 MMOL/L (ref 136–145)
TRIGL SERPL-MCNC: 88 MG/DL (ref 0–150)
TSH SERPL DL<=0.05 MIU/L-ACNC: 0.79 UIU/ML (ref 0.27–4.2)
VLDLC SERPL-MCNC: 17 MG/DL (ref 5–40)
WBC NRBC COR # BLD AUTO: 7.73 10*3/MM3 (ref 3.4–10.8)

## 2025-01-06 ENCOUNTER — TELEPHONE (OUTPATIENT)
Dept: FAMILY MEDICINE CLINIC | Facility: CLINIC | Age: 55
End: 2025-01-06
Payer: MEDICARE

## 2025-01-06 NOTE — TELEPHONE ENCOUNTER
----- Message from Everardo Venegas sent at 1/6/2025 11:05 AM EST -----  Everything on your blood work looks great.  Keep up the good work.

## 2025-01-07 NOTE — PROGRESS NOTES
Subjective   The ABCs of the Annual Wellness Visit  Medicare Wellness Visit      Jamarcus Allison is a 54 y.o. patient who presents for a Medicare Wellness Visit.    The following portions of the patient's history were reviewed and   updated as appropriate: allergies, current medications, past family history, past medical history, past social history, past surgical history, and problem list.    Compared to one year ago, the patient's physical   health is the same.  Compared to one year ago, the patient's mental   health is the same.    Recent Hospitalizations:  He was not admitted to the hospital during the last year.     Current Medical Providers:  Patient Care Team:  Everardo Venegas DO as PCP - General (Family Medicine)  Sada Smith APRN as Nurse Practitioner (Behavioral Health)    Outpatient Medications Prior to Visit   Medication Sig Dispense Refill    Blood Glucose Monitoring Suppl (B-D LOGIC BLOOD GLUCOSE) w/Device kit 1 Device Daily. 1 each 0    Brexpiprazole (Rexulti) 4 MG tablet Take 1 tablet by mouth Daily. 90 tablet 0    FLUoxetine (PROzac) 40 MG capsule Take 2 capsules by mouth Daily. 180 capsule 0    glucose blood test strip Check daily 200 each 12    Hydrocort-Pramoxine, Perianal, (Proctofoam HC) 1-1 % rectal foam Insert 1 application into the rectum 2 (Two) Times a Day. 1 each 0    hydrOXYzine (ATARAX) 10 MG tablet Take 1 tablet by mouth 2 (Two) Times a Day As Needed for Anxiety. 180 tablet 0    Lancets (freestyle) lancets Check daily fasting 200 each 12    traZODone (DESYREL) 150 MG tablet Take 1 tablet by mouth Every Night. 90 tablet 0    atorvastatin (LIPITOR) 40 MG tablet Take 1 tablet by mouth Every Night. 90 tablet 1    dicyclomine (BENTYL) 20 MG tablet TAKE 1 TABLET BY MOUTH EVERY 6 HOURS(THIS IS A DOSE INCREASE FROM 10MG TO 20MG) 360 tablet 0    empagliflozin (Jardiance) 25 MG tablet tablet Take 1 tablet by mouth Daily. 90 tablet 1    glipiZIDE-metFORMIN (METAGLIP) 5-500 MG per  "tablet Take 1 tablet by mouth 2 (Two) Times a Day Before Meals. 180 tablet 1    lisinopril (PRINIVIL,ZESTRIL) 5 MG tablet Take 1 tablet by mouth Daily. 90 tablet 1    pioglitazone (ACTOS) 45 MG tablet Take 1 tablet by mouth Daily. 90 tablet 1     No facility-administered medications prior to visit.     No opioid medication identified on active medication list. I have reviewed chart for other potential  high risk medication/s and harmful drug interactions in the elderly.      Aspirin is not on active medication list.  Aspirin use is not indicated based on review of current medical condition/s. Risk of harm outweighs potential benefits.  .    Patient Active Problem List   Diagnosis    Type 2 diabetes mellitus with hyperglycemia, without long-term current use of insulin    Screening for colon cancer    History of colonic polyps    Rectal bleeding     Advance Care Planning Advance Directive is not on file.  ACP discussion was held with the patient during this visit. Patient does not have an advance directive, information provided.            Objective   Vitals:    01/03/25 1617   BP: 122/78   BP Location: Left arm   Patient Position: Sitting   Pulse: 81   Temp: 98.7 °F (37.1 °C)   SpO2: 97%   Weight: 119 kg (262 lb)   Height: 188 cm (74\")   PainSc: 0-No pain       Estimated body mass index is 33.64 kg/m² as calculated from the following:    Height as of this encounter: 188 cm (74\").    Weight as of this encounter: 119 kg (262 lb).         Does the patient have evidence of cognitive impairment? No  Lab Results   Component Value Date    TRIG 88 01/03/2025    HDL 32 (L) 01/03/2025    LDL 61 01/03/2025    VLDL 17 01/03/2025    HGBA1C 5.60 01/03/2025                                                                                                Health  Risk Assessment    Smoking Status:  Social History     Tobacco Use   Smoking Status Never   Smokeless Tobacco Never     Alcohol Consumption:  Social History     Substance and " Sexual Activity   Alcohol Use Never       Fall Risk Screen  STEADI Fall Risk Assessment was completed, and patient is at HIGH risk for falls. Assessment completed on:1/3/2025    Depression Screening   Little interest or pleasure in doing things? Not at all   Feeling down, depressed, or hopeless? Several days   PHQ-2 Total Score 1      Health Habits and Functional and Cognitive Screenin/3/2025     4:20 PM   Functional & Cognitive Status   Do you have difficulty preparing food and eating? No   Do you have difficulty bathing yourself, getting dressed or grooming yourself? No   Do you have difficulty using the toilet? No   Do you have difficulty moving around from place to place? No   Do you have trouble with steps or getting out of a bed or a chair? Yes   Current Diet Limited Junk Food   Dental Exam Not up to date   Eye Exam Not up to date   Exercise (times per week) 7 times per week   Current Exercises Include Walking   Do you need help using the phone?  No   Are you deaf or do you have serious difficulty hearing?  No   Do you need help to go to places out of walking distance? No   Do you need help shopping? No   Do you need help preparing meals?  No   Do you need help with housework?  No   Do you need help with laundry? No   Do you need help taking your medications? No   Do you need help managing money? No   Do you ever drive or ride in a car without wearing a seat belt? No   Have you felt unusual stress, anger or loneliness in the last month? Yes   Who do you live with? Alone   If you need help, do you have trouble finding someone available to you? Yes   Have you been bothered in the last four weeks by sexual problems? No   Do you have difficulty concentrating, remembering or making decisions? No           Age-appropriate Screening Schedule:  Refer to the list below for future screening recommendations based on patient's age, sex and/or medical conditions. Orders for these recommended tests are listed in  11-Dec-2019 the plan section. The patient has been provided with a written plan.    Health Maintenance List  Health Maintenance   Topic Date Due    Pneumococcal Vaccine 0-64 (1 of 2 - PCV) Never done    Hepatitis B (1 of 3 - 19+ 3-dose series) Never done    TDAP/TD VACCINES (1 - Tdap) Never done    ZOSTER VACCINE (1 of 2) Never done    HEPATITIS C SCREENING  Never done    DIABETIC FOOT EXAM  06/24/2022    DIABETIC EYE EXAM  11/09/2022    COVID-19 Vaccine (1 - 2024-25 season) Never done    ANNUAL WELLNESS VISIT  12/04/2024    INFLUENZA VACCINE  03/31/2025 (Originally 7/1/2024)    BMI FOLLOWUP  06/04/2025    HEMOGLOBIN A1C  07/03/2025    LIPID PANEL  01/03/2026    COLORECTAL CANCER SCREENING  07/13/2032    URINE MICROALBUMIN  Discontinued                                                                                                                                                CMS Preventative Services Quick Reference  Risk Factors Identified During Encounter  Inactivity/Sedentary: Patient was advised to exercise at least 150 minutes a week per CDC recommendations.    The above risks/problems have been discussed with the patient.  Pertinent information has been shared with the patient in the After Visit Summary.  An After Visit Summary and PPPS were made available to the patient.    Follow Up:   Next Medicare Wellness visit to be scheduled in 1 year.         Additional E&M Note during same encounter follows:  Patient has additional, significant, and separately identifiable condition(s)/problem(s) that require work above and beyond the Medicare Wellness Visit     Chief Complaint  Medicare Wellness-subsequent    Subjective   He is following up on diabetes today.  He is agreeable to blood work and has been tolerating his medications well.  DAYAN is also being seen today for additional medical problem/s.    Review of Systems   Psychiatric/Behavioral:  The patient is nervous/anxious.    All other systems reviewed and are negative.  "          Objective   Vital Signs:  /78 (BP Location: Left arm, Patient Position: Sitting)   Pulse 81   Temp 98.7 °F (37.1 °C)   Ht 188 cm (74\")   Wt 119 kg (262 lb)   SpO2 97%   BMI 33.64 kg/m²   Physical Exam  Vitals and nursing note reviewed.   Cardiovascular:      Rate and Rhythm: Normal rate and regular rhythm.      Pulses: Normal pulses.      Heart sounds: Normal heart sounds.   Pulmonary:      Effort: No respiratory distress.      Breath sounds: Normal breath sounds.     The following data was reviewed by: Everardo Venegas DO on 01/03/2025:  Data reviewed :    I reviewed prior labs and prior documentation.          Assessment and Plan Additional age appropriate preventative wellness advice topics were discussed during today's preventative wellness exam(some topics already addressed during AWV portion of the note above):    Physical Activity: Advised cardiovascular activity 150 minutes per week as tolerated. (example brisk walk for 30 minutes, 5 days a week).     Nutrition: Discussed nutrition plan with patient. Information shared in after visit summary. Goal is for a well balanced diet to enhance overall health.           Type 2 diabetes mellitus with hyperglycemia, without long-term current use of insulin      Orders:    Comprehensive metabolic panel; Future    Hemoglobin A1c; Future    Lipid panel; Future    CBC No Differential; Future    TSH; Future    atorvastatin (LIPITOR) 40 MG tablet; Take 1 tablet by mouth Every Night.    empagliflozin (Jardiance) 25 MG tablet tablet; Take 1 tablet by mouth Daily.    glipiZIDE-metFORMIN (METAGLIP) 5-500 MG per tablet; Take 1 tablet by mouth 2 (Two) Times a Day Before Meals.    lisinopril (PRINIVIL,ZESTRIL) 5 MG tablet; Take 1 tablet by mouth Daily.    pioglitazone (ACTOS) 45 MG tablet; Take 1 tablet by mouth Daily.    Medicare annual wellness visit, subsequent         Irritable bowel syndrome with diarrhea    Orders:    dicyclomine (BENTYL) 20 MG tablet; Take " 1 tablet by mouth Every 6 (Six) Hours.    Generalized anxiety disorder           Other insomnia               I spent 30 minutes caring for Jamarcus on this date of service. This time includes time spent by me in the following activities:preparing for the visit, reviewing tests, obtaining and/or reviewing a separately obtained history, performing a medically appropriate examination and/or evaluation , counseling and educating the patient/family/caregiver, ordering medications, tests, or procedures, documenting information in the medical record, and independently interpreting results and communicating that information with the patient/family/caregiver  Follow Up   Return in about 6 months (around 7/3/2025) for Recheck.  Patient was given instructions and counseling regarding his condition or for health maintenance advice. Please see specific information pulled into the AVS if appropriate.

## 2025-01-07 NOTE — ASSESSMENT & PLAN NOTE
Orders:    Comprehensive metabolic panel; Future    Hemoglobin A1c; Future    Lipid panel; Future    CBC No Differential; Future    TSH; Future    atorvastatin (LIPITOR) 40 MG tablet; Take 1 tablet by mouth Every Night.    empagliflozin (Jardiance) 25 MG tablet tablet; Take 1 tablet by mouth Daily.    glipiZIDE-metFORMIN (METAGLIP) 5-500 MG per tablet; Take 1 tablet by mouth 2 (Two) Times a Day Before Meals.    lisinopril (PRINIVIL,ZESTRIL) 5 MG tablet; Take 1 tablet by mouth Daily.    pioglitazone (ACTOS) 45 MG tablet; Take 1 tablet by mouth Daily.

## 2025-02-02 DIAGNOSIS — F41.1 GENERALIZED ANXIETY DISORDER: ICD-10-CM

## 2025-02-02 DIAGNOSIS — G47.09 OTHER INSOMNIA: ICD-10-CM

## 2025-02-03 RX ORDER — TRAZODONE HYDROCHLORIDE 150 MG/1
150 TABLET ORAL NIGHTLY
Qty: 90 TABLET | Refills: 0 | Status: SHIPPED | OUTPATIENT
Start: 2025-02-03

## 2025-02-25 ENCOUNTER — TELEPHONE (OUTPATIENT)
Dept: FAMILY MEDICINE CLINIC | Facility: CLINIC | Age: 55
End: 2025-02-25
Payer: MEDICARE

## 2025-02-25 NOTE — TELEPHONE ENCOUNTER
Caller: SANDRA KEITH    Relationship: ALEX    Best call back number:   NOT ON [facility] VERBAL    What form or medical record are you requesting:   THE PATIENT NEEDS A LETTER FROM THE PROVIDER STATING THAT THE PATIENT HAS PROBLEMS WITH HIS BOWELS AND THAT HE HAS NO CONTROL OF THEM.    Who is requesting this form or medical record from you: DAIJA/ ALEX     How would you like to receive the form or medical records (pick-up, mail, fax):   THE PATIENT WILL     Timeframe paperwork needed: ASAP THE PATIENT HAS AN INSPECTION ON FRIDAY WHERE HE LIVES.    Additional notes: I ASK DAIJA DOES HE WEAR PROTECTIVE GARMENTS UNDER HIS CLOTHES AND SHE SAID NO, HE WONT WEAR THEM.

## 2025-02-26 ENCOUNTER — OFFICE VISIT (OUTPATIENT)
Dept: FAMILY MEDICINE CLINIC | Facility: CLINIC | Age: 55
End: 2025-02-26
Payer: MEDICARE

## 2025-02-26 VITALS
SYSTOLIC BLOOD PRESSURE: 126 MMHG | HEART RATE: 92 BPM | BODY MASS INDEX: 32.47 KG/M2 | TEMPERATURE: 98.2 F | DIASTOLIC BLOOD PRESSURE: 78 MMHG | OXYGEN SATURATION: 96 % | WEIGHT: 253 LBS | HEIGHT: 74 IN

## 2025-02-26 DIAGNOSIS — K58.0 IRRITABLE BOWEL SYNDROME WITH DIARRHEA: Primary | ICD-10-CM

## 2025-02-26 DIAGNOSIS — Z02.9 ADMINISTRATIVE ENCOUNTER: ICD-10-CM

## 2025-02-26 PROCEDURE — 99213 OFFICE O/P EST LOW 20 MIN: CPT | Performed by: FAMILY MEDICINE

## 2025-02-26 PROCEDURE — 3044F HG A1C LEVEL LT 7.0%: CPT | Performed by: FAMILY MEDICINE

## 2025-02-26 PROCEDURE — G2211 COMPLEX E/M VISIT ADD ON: HCPCS | Performed by: FAMILY MEDICINE

## 2025-02-26 PROCEDURE — 1126F AMNT PAIN NOTED NONE PRSNT: CPT | Performed by: FAMILY MEDICINE

## 2025-02-26 NOTE — TELEPHONE ENCOUNTER
"I need more information.  A couple of years ago you reported that you had a couple of accidents due to urgency.  I was under the impression that [facility] has fixed this.  If you have \"no control of them\", today is the first I have heard of it."

## 2025-02-26 NOTE — TELEPHONE ENCOUNTER
Spoke with Swetha stated they have an inspection on Friday & that he has daily accidents staining the carpet that she cannot get clean & he will be evicted if he does not have a note from his Doctor,explained that provider was unaware of this with no documentation he would be unable to just write a letter,she requested he be seen today so this can be addressed,scheduled.

## 2025-02-26 NOTE — LETTER
February 26, 2025     Patient: Jamarcus Allison   YOB: 1970   Date of Visit: 2/26/2025       To Whom It May Concern:    It is my medical opinion that Jamarcus Allison has been diagnosed with irritable bowel syndrome with diarrhea.  He has frequent diarrhea and is treated for this.    Sincerely,        Everardo Venegas, DO

## 2025-02-26 NOTE — PROGRESS NOTES
Subjective   Jamarcus Allison is a 55 y.o. male.   Pt presents today with CC of Irritable Bowel Syndrome      History of Present Illness   History of Present Illness  Patient is a 55-year-old male with history of IBS-D, and history of fecal incontinence here to follow-up on irritable bowel syndrome.  He has no interest in seeing gastroenterology in the past.  He states that in recent weeks he had an episode in his home of uncontrollable diarrhea and he had an accident on his carpet.  Despite cleaning it promptly, he states that it caused a large stone that he was unable to remove.  He reports that he has an inspection later this week from his apartment caretaker, and he is concerned if the stain is found that it may result in fines or eviction.  He requests a letter stating that he has a medical condition that can result in incontinence       The following portions of the patient's history were reviewed and updated as appropriate: allergies, current medications, past family history, past medical history, past social history, past surgical history, and problem list.    Review of Systems   Constitutional:  Negative for chills, fever and unexpected weight loss.   HENT:  Negative for congestion and sore throat.    Eyes:  Negative for blurred vision and visual disturbance.   Respiratory:  Negative for cough and wheezing.    Cardiovascular:  Negative for chest pain and palpitations.   Gastrointestinal:  Positive for diarrhea. Negative for abdominal pain.   Endocrine: Negative for cold intolerance and heat intolerance.   Genitourinary:  Negative for dysuria.   Musculoskeletal:  Negative for arthralgias and neck stiffness.   Neurological:  Negative for dizziness, seizures and syncope.   Psychiatric/Behavioral:  Negative for self-injury, suicidal ideas and depressed mood.      Vitals:    02/26/25 1452   BP: 126/78   Pulse: 92   Temp: 98.2 °F (36.8 °C)   SpO2: 96%        Objective   Physical Exam  Vitals and nursing note  reviewed.   Constitutional:       Appearance: Normal appearance.   Neurological:      Mental Status: He is alert and oriented to person, place, and time.   Psychiatric:         Mood and Affect: Mood normal.         Behavior: Behavior normal.           Assessment & Plan   Diagnoses and all orders for this visit:    1. Irritable bowel syndrome with diarrhea (Primary)    2. Administrative encounter    Letter can be found scanned into his chart.  He does have history of IBS, he has seen gastroenterology in the past and does better with Bentyl then without.                           This document has been electronically signed by Margie Atkins  February 26, 2025 14:53 EST    Dictated Utilizing Dragon Dictation: Part of this note may be an electronic transcription/translation of spoken language to printed text using the Dragon Dictation System.

## 2025-04-07 ENCOUNTER — OFFICE VISIT (OUTPATIENT)
Dept: PSYCHIATRY | Facility: CLINIC | Age: 55
End: 2025-04-07
Payer: MEDICARE

## 2025-04-07 VITALS
WEIGHT: 253.4 LBS | SYSTOLIC BLOOD PRESSURE: 132 MMHG | BODY MASS INDEX: 32.52 KG/M2 | HEIGHT: 74 IN | DIASTOLIC BLOOD PRESSURE: 82 MMHG | HEART RATE: 75 BPM | OXYGEN SATURATION: 98 %

## 2025-04-07 DIAGNOSIS — G47.09 OTHER INSOMNIA: ICD-10-CM

## 2025-04-07 DIAGNOSIS — F60.1 SCHIZOID PERSONALITY DISORDER IN ADULT: Primary | ICD-10-CM

## 2025-04-07 DIAGNOSIS — Z79.899 MEDICATION MANAGEMENT: ICD-10-CM

## 2025-04-07 DIAGNOSIS — F43.10 PTSD (POST-TRAUMATIC STRESS DISORDER): ICD-10-CM

## 2025-04-07 DIAGNOSIS — F41.1 GENERALIZED ANXIETY DISORDER: ICD-10-CM

## 2025-04-07 PROCEDURE — 99214 OFFICE O/P EST MOD 30 MIN: CPT | Performed by: NURSE PRACTITIONER

## 2025-04-07 PROCEDURE — 1159F MED LIST DOCD IN RCRD: CPT | Performed by: NURSE PRACTITIONER

## 2025-04-07 PROCEDURE — 1160F RVW MEDS BY RX/DR IN RCRD: CPT | Performed by: NURSE PRACTITIONER

## 2025-04-07 PROCEDURE — 96127 BRIEF EMOTIONAL/BEHAV ASSMT: CPT | Performed by: NURSE PRACTITIONER

## 2025-04-07 RX ORDER — HYDROXYZINE HYDROCHLORIDE 10 MG/1
10 TABLET, FILM COATED ORAL 2 TIMES DAILY PRN
Qty: 180 TABLET | Refills: 0 | Status: SHIPPED | OUTPATIENT
Start: 2025-04-07

## 2025-04-07 RX ORDER — TRAZODONE HYDROCHLORIDE 150 MG/1
150 TABLET ORAL NIGHTLY
Qty: 90 TABLET | Refills: 0 | Status: SHIPPED | OUTPATIENT
Start: 2025-04-07

## 2025-04-07 RX ORDER — BREXPIPRAZOLE 4 MG/1
4 TABLET ORAL DAILY
Qty: 90 TABLET | Refills: 0 | Status: SHIPPED | OUTPATIENT
Start: 2025-04-07

## 2025-04-07 NOTE — PROGRESS NOTES
Subjective   Jamarcus Allison is a 55 y.o. male who presents today for follow up    Chief Complaint:  Depression    History of Present Illness: Patient presents as follow up. Reports increase of depression over the last 2 weeks, also reports he has been out of fluoxetine over the last 2 weeks. States he has now switched to a pharmacy that offers delivery. States he feels the depression is related to not being on his medication. Reports having some better off dead thoughts denies any intent. He reports sleep is good. Reports appetite is good. States he and his sister contribute food and has been working out well. He denies SI/HI/AVH.    The following portions of the patient's history were reviewed and updated as appropriate: allergies, current medications, past family history, past medical history, past social history, past surgical history and problem list.      Past Medical History:  Past Medical History:   Diagnosis Date    Arthritis     Chronic pain disorder     Colon polyp     Depression     Diabetes mellitus     Diabetic neuropathy     Elevated cholesterol     GERD (gastroesophageal reflux disease)     Hypertension     IBS (irritable bowel syndrome)     Obsessive-compulsive disorder     Peripheral artery disease     Peripheral neuropathy     Psychiatric illness     Psychosis     PTSD (post-traumatic stress disorder)     Rectal bleeding     Schizoaffective disorder     Schizoid personality        Social History:  Social History     Socioeconomic History    Marital status: Single   Tobacco Use    Smoking status: Never    Smokeless tobacco: Never   Vaping Use    Vaping status: Never Used   Substance and Sexual Activity    Alcohol use: Never    Drug use: Never    Sexual activity: Not Currently     Partners: Female     Birth control/protection: Condom       Family History:  Family History   Problem Relation Age of Onset    Heart disease Mother     Diabetes Mother     Cancer Father     Alcohol abuse Father     Heart  disease Sister     Diabetes Sister     Cancer Sister     Thyroid disease Sister     Depression Sister     Anxiety disorder Sister     ADD / ADHD Sister     Depression Brother     Anxiety disorder Brother     Alcohol abuse Brother     Drug abuse Brother     Paranoid behavior Brother        Past Surgical History:  Past Surgical History:   Procedure Laterality Date    CATARACT EXTRACTION      COLONOSCOPY      COLONOSCOPY N/A 07/13/2022    Procedure: COLONOSCOPY;  Surgeon: Freddy Mortensen MD;  Location: [facility];  Service: Gastroenterology;  Laterality: N/A;    EYE SURGERY      POLYPECTOMY      VASECTOMY         Problem List:  Patient Active Problem List   Diagnosis    Type 2 diabetes mellitus with hyperglycemia, without long-term current use of insulin    Screening for colon cancer    History of colonic polyps    Rectal bleeding       Allergy:   Allergies   Allergen Reactions    Poison Ivy Extract Anaphylaxis    Poison Oak Extract Anaphylaxis    Sumac Anaphylaxis        Current Medications:   Current Outpatient Medications   Medication Sig Dispense Refill    atorvastatin (LIPITOR) 40 MG tablet Take 1 tablet by mouth Every Night. 90 tablet 1    Blood Glucose Monitoring Suppl (B-D LOGIC BLOOD GLUCOSE) w/Device kit 1 Device Daily. 1 each 0    Brexpiprazole (Rexulti) 4 MG tablet Take 1 tablet by mouth Daily. 90 tablet 0    dicyclomine (BENTYL) 20 MG tablet Take 1 tablet by mouth Every 6 (Six) Hours. 360 tablet 1    empagliflozin (Jardiance) 25 MG tablet tablet Take 1 tablet by mouth Daily. 90 tablet 1    FLUoxetine (PROzac) 20 MG capsule Take 1 capsule by mouth Daily for 7 days, THEN 2 capsules Daily for 7 days, THEN 3 capsules Daily for 16 days. 69 capsule 0    glipiZIDE-metFORMIN (METAGLIP) 5-500 MG per tablet Take 1 tablet by mouth 2 (Two) Times a Day Before Meals. 180 tablet 1    glucose blood test strip Check daily 200 each 12    Hydrocort-Pramoxine, Perianal, (Proctofoam HC) 1-1 % rectal foam Insert 1  "application into the rectum 2 (Two) Times a Day. 1 each 0    hydrOXYzine (ATARAX) 10 MG tablet Take 1 tablet by mouth 2 (Two) Times a Day As Needed for Anxiety. 180 tablet 0    Lancets (freestyle) lancets Check daily fasting 200 each 12    lisinopril (PRINIVIL,ZESTRIL) 5 MG tablet Take 1 tablet by mouth Daily. 90 tablet 1    pioglitazone (ACTOS) 45 MG tablet Take 1 tablet by mouth Daily. 90 tablet 1    traZODone (DESYREL) 150 MG tablet Take 1 tablet by mouth Every Night. 90 tablet 0     No current facility-administered medications for this visit.       Review of Symptoms:    Review of Systems   Constitutional:  Positive for fatigue.   HENT: Negative.     Eyes: Negative.    Respiratory: Negative.     Cardiovascular: Negative.    Gastrointestinal: Negative.    Genitourinary: Negative.    Neurological: Negative.    Psychiatric/Behavioral:  Positive for sleep disturbance, depressed mood and stress. Negative for suicidal ideas. The patient is nervous/anxious.        Objective   Physical Exam:   Blood pressure 132/82, pulse 75, height 188 cm (74\"), weight 115 kg (253 lb 6.4 oz), SpO2 98%.  Body mass index is 32.53 kg/m².    Appearance: Well nourished male, appropriately dressed, appears stated age and in no acute distress  Gait, Station, Strength: WNL    Mental Status Exam:   Hygiene:   good  Cooperation:  Cooperative  Eye Contact:  Good  Psychomotor Behavior:  Appropriate  Affect:  Appropriate  Mood: normal  Hopelessness: 6  Speech:  Normal  Thought Process:  Linear  Thought Content:  Mood congruent  Suicidal:  None  Homicidal:  None  Hallucinations:  None  Delusion:  None  Memory:  Intact  Orientation:  Person, Place, Time, and Situation  Reliability:  good  Insight:  Fair  Judgement:  Fair  Impulse Control:  Fair  Physical/Medical Issues:   see med hx      PHQ-Score Total:  PHQ-9 Total Score: 15   Patient screened positive for depression based on a PHQ-9 score of 15 on 4/7/2025. Follow-up recommendations include: " Prescribed antidepressant medication treatment and Suicide Risk Assessment performed.        Lab Results:   No visits with results within 1 Month(s) from this visit.   Latest known visit with results is:   Lab on 01/03/2025   Component Date Value Ref Range Status    Glucose 01/03/2025 78  65 - 99 mg/dL Final    BUN 01/03/2025 9  6 - 20 mg/dL Final    Creatinine 01/03/2025 0.74 (L)  0.76 - 1.27 mg/dL Final    Sodium 01/03/2025 144  136 - 145 mmol/L Final    Potassium 01/03/2025 3.7  3.5 - 5.2 mmol/L Final    Chloride 01/03/2025 104  98 - 107 mmol/L Final    CO2 01/03/2025 28.0  22.0 - 29.0 mmol/L Final    Calcium 01/03/2025 8.9  8.6 - 10.5 mg/dL Final    Total Protein 01/03/2025 6.7  6.0 - 8.5 g/dL Final    Albumin 01/03/2025 3.9  3.5 - 5.2 g/dL Final    ALT (SGPT) 01/03/2025 38  1 - 41 U/L Final    AST (SGOT) 01/03/2025 29  1 - 40 U/L Final    Alkaline Phosphatase 01/03/2025 109  39 - 117 U/L Final    Total Bilirubin 01/03/2025 0.4  0.0 - 1.2 mg/dL Final    Globulin 01/03/2025 2.8  gm/dL Final    A/G Ratio 01/03/2025 1.4  g/dL Final    BUN/Creatinine Ratio 01/03/2025 12.2  7.0 - 25.0 Final    Anion Gap 01/03/2025 12.0  5.0 - 15.0 mmol/L Final    eGFR 01/03/2025 107.7  >60.0 mL/min/1.73 Final    Hemoglobin A1C 01/03/2025 5.60  4.80 - 5.60 % Final    Total Cholesterol 01/03/2025 110  0 - 200 mg/dL Final    Triglycerides 01/03/2025 88  0 - 150 mg/dL Final    HDL Cholesterol 01/03/2025 32 (L)  40 - 60 mg/dL Final    LDL Cholesterol  01/03/2025 61  0 - 100 mg/dL Final    VLDL Cholesterol 01/03/2025 17  5 - 40 mg/dL Final    LDL/HDL Ratio 01/03/2025 1.89   Final    WBC 01/03/2025 7.73  3.40 - 10.80 10*3/mm3 Final    RBC 01/03/2025 4.80  4.14 - 5.80 10*6/mm3 Final    Hemoglobin 01/03/2025 14.2  13.0 - 17.7 g/dL Final    Hematocrit 01/03/2025 43.3  37.5 - 51.0 % Final    MCV 01/03/2025 90.2  79.0 - 97.0 fL Final    MCH 01/03/2025 29.6  26.6 - 33.0 pg Final    MCHC 01/03/2025 32.8  31.5 - 35.7 g/dL Final    RDW 01/03/2025  12.3  12.3 - 15.4 % Final    RDW-SD 01/03/2025 40.3  37.0 - 54.0 fl Final    MPV 01/03/2025 12.5 (H)  6.0 - 12.0 fL Final    Platelets 01/03/2025 241  140 - 450 10*3/mm3 Final    TSH 01/03/2025 0.788  0.270 - 4.200 uIU/mL Final       Assessment & Plan   Diagnoses and all orders for this visit:    1. Schizoid personality disorder in adult (Primary)  -     Brexpiprazole (Rexulti) 4 MG tablet; Take 1 tablet by mouth Daily.  Dispense: 90 tablet; Refill: 0  -     FLUoxetine (PROzac) 20 MG capsule; Take 1 capsule by mouth Daily for 7 days, THEN 2 capsules Daily for 7 days, THEN 3 capsules Daily for 16 days.  Dispense: 69 capsule; Refill: 0    2. Generalized anxiety disorder  -     Brexpiprazole (Rexulti) 4 MG tablet; Take 1 tablet by mouth Daily.  Dispense: 90 tablet; Refill: 0  -     FLUoxetine (PROzac) 20 MG capsule; Take 1 capsule by mouth Daily for 7 days, THEN 2 capsules Daily for 7 days, THEN 3 capsules Daily for 16 days.  Dispense: 69 capsule; Refill: 0  -     hydrOXYzine (ATARAX) 10 MG tablet; Take 1 tablet by mouth 2 (Two) Times a Day As Needed for Anxiety.  Dispense: 180 tablet; Refill: 0  -     traZODone (DESYREL) 150 MG tablet; Take 1 tablet by mouth Every Night.  Dispense: 90 tablet; Refill: 0    3. Other insomnia  -     traZODone (DESYREL) 150 MG tablet; Take 1 tablet by mouth Every Night.  Dispense: 90 tablet; Refill: 0    4. Medication management    5. PTSD (post-traumatic stress disorder)        -Restart fluoxetine 20 mg daily for 7 days then increase to 40 mg for 7 days then increase to 60 mg daily for anxiety and depression   -Continue brexpiprazole 4 mg daily for AVH and paranoia. Lengthy discussion with patient on the possible side effects of antipsychotic medications including increased cholesterol, increased blood sugar, and possibility of weight gain.  Also discussed the need to monitor lab work associated with this. The risk of muscle movement disorders with this class of medication was also  discussed.  -Continue trazodone 150 mg nightly for sleep  -Continue hydroxyzine 10 mg twice daily as needed for anxiety   -Encouraged patient to begin therapy  -YADIEL reviewed and appropriate. Patient counseled on use of controlled substances.  -The benefits of a healthy diet and exercise were discussed with patient, especially the positive effects they have on mental health. Patient encouraged to consider lifestyle modification regarding  diet and exercise patterns to maximize results of mental health treatment.  -Reviewed previous available documentation  -Reviewed most recent available labs                  Visit Diagnoses:    ICD-10-CM ICD-9-CM   1. Schizoid personality disorder in adult  F60.1 301.20   2. Generalized anxiety disorder  F41.1 300.02   3. Other insomnia  G47.09 780.52   4. Medication management  Z79.899 V58.69   5. PTSD (post-traumatic stress disorder)  F43.10 309.81         TREATMENT PLAN/GOALS: Continue supportive psychotherapy efforts and medications as indicated. Treatment and medication options discussed during today's visit. Patient acknowledged and verbally consented to continue with current treatment plan and was educated on the importance of compliance with treatment and follow-up appointments.    MEDICATION ISSUES:    Discussed medication options and treatment plan of prescribed medication as well as the risks, benefits, and side effects including potential falls, possible impaired driving and metabolic adversities among others. Patient is agreeable to call the office with any worsening of symptoms or onset of side effects. Patient is agreeable to call 911 or go to the nearest ER should he/she begin having SI/HI.     MEDS ORDERED DURING VISIT:  New Medications Ordered This Visit   Medications    Brexpiprazole (Rexulti) 4 MG tablet     Sig: Take 1 tablet by mouth Daily.     Dispense:  90 tablet     Refill:  0    FLUoxetine (PROzac) 20 MG capsule     Sig: Take 1 capsule by mouth Daily for  7 days, THEN 2 capsules Daily for 7 days, THEN 3 capsules Daily for 16 days.     Dispense:  69 capsule     Refill:  0    hydrOXYzine (ATARAX) 10 MG tablet     Sig: Take 1 tablet by mouth 2 (Two) Times a Day As Needed for Anxiety.     Dispense:  180 tablet     Refill:  0    traZODone (DESYREL) 150 MG tablet     Sig: Take 1 tablet by mouth Every Night.     Dispense:  90 tablet     Refill:  0       Return in about 4 weeks (around 5/5/2025), or if symptoms worsen or fail to improve.         Prognosis: Guarded dependent on medication/follow up and treatment plan compliance.  Functionality: pt showing improvements in important areas of daily functioning.     Short-term goals: Patient will adhere to medication regimen and note continued improvement in symptoms over the next 3 months.   Long-term goals: Patient will be adherent to medication management and psychotherapy with continued improvement in symptoms over the next 6 months          This document has been electronically signed by RUPINDER Aguilar   April 7, 2025 14:46 EDT    Part of this note may be an electronic transcription/translation of spoken language to printed text using the Dragon Dictation System.

## 2025-05-14 ENCOUNTER — OFFICE VISIT (OUTPATIENT)
Dept: PSYCHIATRY | Facility: CLINIC | Age: 55
End: 2025-05-14
Payer: MEDICARE

## 2025-05-14 VITALS
BODY MASS INDEX: 32.6 KG/M2 | SYSTOLIC BLOOD PRESSURE: 136 MMHG | WEIGHT: 254 LBS | HEIGHT: 74 IN | HEART RATE: 87 BPM | DIASTOLIC BLOOD PRESSURE: 78 MMHG | OXYGEN SATURATION: 96 %

## 2025-05-14 DIAGNOSIS — G47.09 OTHER INSOMNIA: ICD-10-CM

## 2025-05-14 DIAGNOSIS — Z79.899 MEDICATION MANAGEMENT: ICD-10-CM

## 2025-05-14 DIAGNOSIS — F60.1 SCHIZOID PERSONALITY DISORDER IN ADULT: ICD-10-CM

## 2025-05-14 DIAGNOSIS — F41.1 GENERALIZED ANXIETY DISORDER: Primary | ICD-10-CM

## 2025-05-14 DIAGNOSIS — F43.10 PTSD (POST-TRAUMATIC STRESS DISORDER): ICD-10-CM

## 2025-05-14 RX ORDER — FLUOXETINE HYDROCHLORIDE 40 MG/1
80 CAPSULE ORAL DAILY
Qty: 180 CAPSULE | Refills: 0 | Status: SHIPPED | OUTPATIENT
Start: 2025-05-14

## 2025-05-14 NOTE — PROGRESS NOTES
Subjective   Jamarcus Allison is a 55 y.o. male who presents today for follow up    Chief Complaint:  Depression    History of Present Illness: Patient presents as follow up. Reports increase of depression over the last 2 weeks, also reports he has been out of fluoxetine over the last 2 weeks. States he has now switched to a pharmacy that offers delivery. States he feels the depression is related to not being on his medication. Reports having some better off dead thoughts denies any intent. He reports sleep is good. Reports appetite is good. States he and his sister contribute food and has been working out well. He denies SI/HI/AVH.    The following portions of the patient's history were reviewed and updated as appropriate: allergies, current medications, past family history, past medical history, past social history, past surgical history and problem list.      Past Medical History:  Past Medical History:   Diagnosis Date    Arthritis     Chronic pain disorder     Colon polyp     Depression     Diabetes mellitus     Diabetic neuropathy     Elevated cholesterol     GERD (gastroesophageal reflux disease)     Hypertension     IBS (irritable bowel syndrome)     Obsessive-compulsive disorder     Peripheral artery disease     Peripheral neuropathy     Psychiatric illness     Psychosis     PTSD (post-traumatic stress disorder)     Rectal bleeding     Schizoaffective disorder     Schizoid personality        Social History:  Social History     Socioeconomic History    Marital status: Single   Tobacco Use    Smoking status: Never    Smokeless tobacco: Never   Vaping Use    Vaping status: Never Used   Substance and Sexual Activity    Alcohol use: Never    Drug use: Never    Sexual activity: Not Currently     Partners: Female     Birth control/protection: Condom       Family History:  Family History   Problem Relation Age of Onset    Heart disease Mother     Diabetes Mother     Cancer Father     Alcohol abuse Father     Heart  disease Sister     Diabetes Sister     Cancer Sister     Thyroid disease Sister     Depression Sister     Anxiety disorder Sister     ADD / ADHD Sister     Depression Brother     Anxiety disorder Brother     Alcohol abuse Brother     Drug abuse Brother     Paranoid behavior Brother        Past Surgical History:  Past Surgical History:   Procedure Laterality Date    CATARACT EXTRACTION      COLONOSCOPY      COLONOSCOPY N/A 07/13/2022    Procedure: COLONOSCOPY;  Surgeon: Freddy Mortensen MD;  Location: [facility];  Service: Gastroenterology;  Laterality: N/A;    EYE SURGERY      POLYPECTOMY      VASECTOMY         Problem List:  Patient Active Problem List   Diagnosis    Type 2 diabetes mellitus with hyperglycemia, without long-term current use of insulin    Screening for colon cancer    History of colonic polyps    Rectal bleeding       Allergy:   Allergies   Allergen Reactions    Poison Ivy Extract Anaphylaxis    Poison Oak Extract Anaphylaxis    Sumac Anaphylaxis        Current Medications:   Current Outpatient Medications   Medication Sig Dispense Refill    atorvastatin (LIPITOR) 40 MG tablet Take 1 tablet by mouth Every Night. 90 tablet 1    Blood Glucose Monitoring Suppl (B-D LOGIC BLOOD GLUCOSE) w/Device kit 1 Device Daily. 1 each 0    Brexpiprazole (Rexulti) 4 MG tablet Take 1 tablet by mouth Daily. 90 tablet 0    dicyclomine (BENTYL) 20 MG tablet Take 1 tablet by mouth Every 6 (Six) Hours. 360 tablet 1    empagliflozin (Jardiance) 25 MG tablet tablet Take 1 tablet by mouth Daily. 90 tablet 1    FLUoxetine (PROzac) 40 MG capsule Take 2 capsules by mouth Daily. 180 capsule 0    glipiZIDE-metFORMIN (METAGLIP) 5-500 MG per tablet Take 1 tablet by mouth 2 (Two) Times a Day Before Meals. 180 tablet 1    glucose blood test strip Check daily 200 each 12    Hydrocort-Pramoxine, Perianal, (Proctofoam HC) 1-1 % rectal foam Insert 1 application into the rectum 2 (Two) Times a Day. 1 each 0    hydrOXYzine (ATARAX) 10  "MG tablet Take 1 tablet by mouth 2 (Two) Times a Day As Needed for Anxiety. 180 tablet 0    Lancets (freestyle) lancets Check daily fasting 200 each 12    lisinopril (PRINIVIL,ZESTRIL) 5 MG tablet Take 1 tablet by mouth Daily. 90 tablet 1    pioglitazone (ACTOS) 45 MG tablet Take 1 tablet by mouth Daily. 90 tablet 1    traZODone (DESYREL) 150 MG tablet Take 1 tablet by mouth Every Night. 90 tablet 0     No current facility-administered medications for this visit.       Review of Symptoms:    Review of Systems   Constitutional:  Positive for fatigue.   HENT: Negative.     Eyes: Negative.    Respiratory: Negative.     Cardiovascular: Negative.    Gastrointestinal: Negative.    Genitourinary: Negative.    Musculoskeletal:  Positive for arthralgias.   Neurological: Negative.    Psychiatric/Behavioral:  Positive for sleep disturbance, depressed mood and stress. Negative for suicidal ideas. The patient is nervous/anxious.        Objective   Physical Exam:   Blood pressure 136/78, pulse 87, height 188 cm (74\"), weight 115 kg (254 lb), SpO2 96%.  Body mass index is 32.61 kg/m².    Appearance: Well nourished male, appropriately dressed, appears stated age and in no acute distress  Gait, Station, Strength: WNL    Mental Status Exam:   Hygiene:   good  Cooperation:  Cooperative  Eye Contact:  Good  Psychomotor Behavior:  Appropriate  Affect:  Appropriate  Mood: normal  Hopelessness: 6  Speech:  Normal  Thought Process:  Linear  Thought Content:  Mood congruent  Suicidal:  None  Homicidal:  None  Hallucinations:  None  Delusion:  None  Memory:  Intact  Orientation:  Person, Place, Time, and Situation  Reliability:  good  Insight:  Fair  Judgement:  Fair  Impulse Control:  Fair  Physical/Medical Issues:   see med hx      PHQ-Score Total:  PHQ-9 Total Score: 9   Patient screened positive for depression based on a PHQ-9 score of 9 on 5/14/2025. Follow-up recommendations include: Prescribed antidepressant medication treatment and " Suicide Risk Assessment performed.        Lab Results:   No visits with results within 1 Month(s) from this visit.   Latest known visit with results is:   Lab on 01/03/2025   Component Date Value Ref Range Status    Glucose 01/03/2025 78  65 - 99 mg/dL Final    BUN 01/03/2025 9  6 - 20 mg/dL Final    Creatinine 01/03/2025 0.74 (L)  0.76 - 1.27 mg/dL Final    Sodium 01/03/2025 144  136 - 145 mmol/L Final    Potassium 01/03/2025 3.7  3.5 - 5.2 mmol/L Final    Chloride 01/03/2025 104  98 - 107 mmol/L Final    CO2 01/03/2025 28.0  22.0 - 29.0 mmol/L Final    Calcium 01/03/2025 8.9  8.6 - 10.5 mg/dL Final    Total Protein 01/03/2025 6.7  6.0 - 8.5 g/dL Final    Albumin 01/03/2025 3.9  3.5 - 5.2 g/dL Final    ALT (SGPT) 01/03/2025 38  1 - 41 U/L Final    AST (SGOT) 01/03/2025 29  1 - 40 U/L Final    Alkaline Phosphatase 01/03/2025 109  39 - 117 U/L Final    Total Bilirubin 01/03/2025 0.4  0.0 - 1.2 mg/dL Final    Globulin 01/03/2025 2.8  gm/dL Final    A/G Ratio 01/03/2025 1.4  g/dL Final    BUN/Creatinine Ratio 01/03/2025 12.2  7.0 - 25.0 Final    Anion Gap 01/03/2025 12.0  5.0 - 15.0 mmol/L Final    eGFR 01/03/2025 107.7  >60.0 mL/min/1.73 Final    Hemoglobin A1C 01/03/2025 5.60  4.80 - 5.60 % Final    Total Cholesterol 01/03/2025 110  0 - 200 mg/dL Final    Triglycerides 01/03/2025 88  0 - 150 mg/dL Final    HDL Cholesterol 01/03/2025 32 (L)  40 - 60 mg/dL Final    LDL Cholesterol  01/03/2025 61  0 - 100 mg/dL Final    VLDL Cholesterol 01/03/2025 17  5 - 40 mg/dL Final    LDL/HDL Ratio 01/03/2025 1.89   Final    WBC 01/03/2025 7.73  3.40 - 10.80 10*3/mm3 Final    RBC 01/03/2025 4.80  4.14 - 5.80 10*6/mm3 Final    Hemoglobin 01/03/2025 14.2  13.0 - 17.7 g/dL Final    Hematocrit 01/03/2025 43.3  37.5 - 51.0 % Final    MCV 01/03/2025 90.2  79.0 - 97.0 fL Final    MCH 01/03/2025 29.6  26.6 - 33.0 pg Final    MCHC 01/03/2025 32.8  31.5 - 35.7 g/dL Final    RDW 01/03/2025 12.3  12.3 - 15.4 % Final    RDW-SD 01/03/2025 40.3   37.0 - 54.0 fl Final    MPV 01/03/2025 12.5 (H)  6.0 - 12.0 fL Final    Platelets 01/03/2025 241  140 - 450 10*3/mm3 Final    TSH 01/03/2025 0.788  0.270 - 4.200 uIU/mL Final       Assessment & Plan   Diagnoses and all orders for this visit:    1. Generalized anxiety disorder (Primary)  -     FLUoxetine (PROzac) 40 MG capsule; Take 2 capsules by mouth Daily.  Dispense: 180 capsule; Refill: 0    2. Schizoid personality disorder in adult  -     FLUoxetine (PROzac) 40 MG capsule; Take 2 capsules by mouth Daily.  Dispense: 180 capsule; Refill: 0    3. Other insomnia    4. Medication management    5. PTSD (post-traumatic stress disorder)  -     Ambulatory Referral to Social Care Services (Amb Case Mgmt)          -Increase fluoxetine 80 mg daily for anxiety and depression   -Continue brexpiprazole 4 mg daily for AVH and paranoia. Lengthy discussion with patient on the possible side effects of antipsychotic medications including increased cholesterol, increased blood sugar, and possibility of weight gain.  Also discussed the need to monitor lab work associated with this. The risk of muscle movement disorders with this class of medication was also discussed.  -Continue trazodone 150 mg nightly for sleep  -Continue hydroxyzine 10 mg twice daily as needed for anxiety   -Encouraged patient to begin therapy  -YADIEL reviewed and appropriate. Patient counseled on use of controlled substances.  -The benefits of a healthy diet and exercise were discussed with patient, especially the positive effects they have on mental health. Patient encouraged to consider lifestyle modification regarding  diet and exercise patterns to maximize results of mental health treatment.  -Reviewed previous available documentation  -Reviewed most recent available labs                  Visit Diagnoses:    ICD-10-CM ICD-9-CM   1. Generalized anxiety disorder  F41.1 300.02   2. Schizoid personality disorder in adult  F60.1 301.20   3. Other insomnia  G47.09  780.52   4. Medication management  Z79.899 V58.69   5. PTSD (post-traumatic stress disorder)  F43.10 309.81           TREATMENT PLAN/GOALS: Continue supportive psychotherapy efforts and medications as indicated. Treatment and medication options discussed during today's visit. Patient acknowledged and verbally consented to continue with current treatment plan and was educated on the importance of compliance with treatment and follow-up appointments.    MEDICATION ISSUES:    Discussed medication options and treatment plan of prescribed medication as well as the risks, benefits, and side effects including potential falls, possible impaired driving and metabolic adversities among others. Patient is agreeable to call the office with any worsening of symptoms or onset of side effects. Patient is agreeable to call 911 or go to the nearest ER should he/she begin having SI/HI.     MEDS ORDERED DURING VISIT:  New Medications Ordered This Visit   Medications    FLUoxetine (PROzac) 40 MG capsule     Sig: Take 2 capsules by mouth Daily.     Dispense:  180 capsule     Refill:  0       Return in about 8 weeks (around 7/9/2025).         Prognosis: Guarded dependent on medication/follow up and treatment plan compliance.  Functionality: pt showing improvements in important areas of daily functioning.     Short-term goals: Patient will adhere to medication regimen and note continued improvement in symptoms over the next 3 months.   Long-term goals: Patient will be adherent to medication management and psychotherapy with continued improvement in symptoms over the next 6 months          This document has been electronically signed by RUPINDER Aguilar   May 14, 2025 15:59 EDT    Part of this note may be an electronic transcription/translation of spoken language to printed text using the Dragon Dictation System.

## 2025-05-15 ENCOUNTER — REFERRAL TRIAGE (OUTPATIENT)
Age: 55
End: 2025-05-15
Payer: MEDICARE

## 2025-05-19 ENCOUNTER — PATIENT OUTREACH (OUTPATIENT)
Age: 55
End: 2025-05-19
Payer: MEDICARE

## 2025-05-19 NOTE — OUTREACH NOTE
SW attempted contact with [facility]. The mail box was full, so SW could not leave a message. SW will attempt again in a couple of business days.     Jania ALLEN -   Ambulatory Case Management    5/19/2025, 09:39 EDT

## 2025-05-21 ENCOUNTER — PATIENT OUTREACH (OUTPATIENT)
Age: 55
End: 2025-05-21
Payer: MEDICARE

## 2025-05-21 NOTE — OUTREACH NOTE
SW attempted to contact pt a second time, and scheduled a third call for one week out. Mail box is full, so no messages can be left at this time. Sister's phone number is D/c. SW will attempt again in a week, and send out a letter thereafter if no response.     Jania ALLEN -   Ambulatory Case Management    5/21/2025, 10:21 EDT

## 2025-05-28 ENCOUNTER — PATIENT OUTREACH (OUTPATIENT)
Age: 55
End: 2025-05-28
Payer: MEDICARE

## 2025-05-28 NOTE — OUTREACH NOTE
UTRx3. SW has attempted to contact pt with [facility]. SW will D/c pt due to U[facility], if no response within a week. SW to send letter to pt. Pt may contact SW and received services, should they be needed in the future.     Jania ALLEN -   Ambulatory Case Management    5/28/2025, 11:52 EDT

## 2025-06-04 ENCOUNTER — PATIENT OUTREACH (OUTPATIENT)
Age: 55
End: 2025-06-04
Payer: MEDICARE

## 2025-06-04 NOTE — OUTREACH NOTE
SW to D/c pt due to [facility].     Jania ALLEN -   Ambulatory Case Management    6/4/2025, 09:09 EDT

## 2025-06-30 ENCOUNTER — OFFICE VISIT (OUTPATIENT)
Dept: FAMILY MEDICINE CLINIC | Facility: CLINIC | Age: 55
End: 2025-06-30
Payer: MEDICARE

## 2025-06-30 VITALS
WEIGHT: 243 LBS | BODY MASS INDEX: 31.18 KG/M2 | OXYGEN SATURATION: 95 % | SYSTOLIC BLOOD PRESSURE: 130 MMHG | HEART RATE: 92 BPM | DIASTOLIC BLOOD PRESSURE: 78 MMHG | HEIGHT: 74 IN | TEMPERATURE: 98.2 F

## 2025-06-30 DIAGNOSIS — E11.65 TYPE 2 DIABETES MELLITUS WITH HYPERGLYCEMIA, WITHOUT LONG-TERM CURRENT USE OF INSULIN: ICD-10-CM

## 2025-06-30 DIAGNOSIS — G47.09 OTHER INSOMNIA: ICD-10-CM

## 2025-06-30 DIAGNOSIS — R05.1 ACUTE COUGH: Primary | ICD-10-CM

## 2025-06-30 DIAGNOSIS — F41.1 GENERALIZED ANXIETY DISORDER: ICD-10-CM

## 2025-06-30 PROCEDURE — 1126F AMNT PAIN NOTED NONE PRSNT: CPT | Performed by: FAMILY MEDICINE

## 2025-06-30 PROCEDURE — G2211 COMPLEX E/M VISIT ADD ON: HCPCS | Performed by: FAMILY MEDICINE

## 2025-06-30 PROCEDURE — 99214 OFFICE O/P EST MOD 30 MIN: CPT | Performed by: FAMILY MEDICINE

## 2025-06-30 PROCEDURE — 3044F HG A1C LEVEL LT 7.0%: CPT | Performed by: FAMILY MEDICINE

## 2025-06-30 RX ORDER — GLIPIZIDE AND METFORMIN HCL 5; 500 MG/1; MG/1
1 TABLET, FILM COATED ORAL
Qty: 180 TABLET | Refills: 1 | Status: SHIPPED | OUTPATIENT
Start: 2025-06-30

## 2025-06-30 RX ORDER — TRAZODONE HYDROCHLORIDE 150 MG/1
150 TABLET ORAL NIGHTLY
Qty: 90 TABLET | Refills: 0 | Status: SHIPPED | OUTPATIENT
Start: 2025-06-30

## 2025-06-30 RX ORDER — LISINOPRIL 5 MG/1
5 TABLET ORAL DAILY
Qty: 90 TABLET | Refills: 1 | Status: SHIPPED | OUTPATIENT
Start: 2025-06-30

## 2025-06-30 RX ORDER — PIOGLITAZONE 45 MG/1
45 TABLET ORAL DAILY
Qty: 90 TABLET | Refills: 1 | Status: SHIPPED | OUTPATIENT
Start: 2025-06-30

## 2025-06-30 NOTE — PROGRESS NOTES
Subjective   Jamarcus Allison is a 55 y.o. male.   Pt presents today with CC of Diabetes      History of Present Illness   History of Present Illness  Patient is a 55-year-old male here to follow-up on insomnia.  He needs refill of trazodone.  He also follows with psychiatry and has been stable on his current medications.  #2 he has diabetes and has been stable on his diabetic medications.  He has been working on weight loss.  He is down 11 pounds since his last appointment.  He has no concerns today other than cough and congestion for the past week.  He would like to be evaluated for cough.       The following portions of the patient's history were reviewed and updated as appropriate: allergies, current medications, past family history, past medical history, past social history, past surgical history, and problem list.    Review of Systems   Constitutional:  Negative for chills, fever and unexpected weight loss.   HENT:  Negative for congestion and sore throat.    Eyes:  Negative for blurred vision and visual disturbance.   Respiratory:  Negative for cough and wheezing.    Cardiovascular:  Negative for chest pain and palpitations.   Gastrointestinal:  Negative for abdominal pain and diarrhea.   Endocrine: Negative for cold intolerance and heat intolerance.   Genitourinary:  Negative for dysuria.   Musculoskeletal:  Negative for arthralgias and neck stiffness.   Neurological:  Negative for dizziness, seizures and syncope.   Psychiatric/Behavioral:  Negative for self-injury, suicidal ideas and depressed mood.      Vitals:    06/30/25 1024   BP: 130/78   Pulse: 92   Temp: 98.2 °F (36.8 °C)   SpO2: 95%        Objective   Physical Exam  Vitals and nursing note reviewed.   Constitutional:       Appearance: He is well-developed.   HENT:      Head: Normocephalic and atraumatic.      Right Ear: External ear normal.      Left Ear: External ear normal.      Nose: Nose normal.   Eyes:      Conjunctiva/sclera: Conjunctivae  normal.      Pupils: Pupils are equal, round, and reactive to light.   Cardiovascular:      Rate and Rhythm: Normal rate and regular rhythm.      Heart sounds: Normal heart sounds.   Pulmonary:      Effort: Pulmonary effort is normal.      Breath sounds: Normal breath sounds.   Abdominal:      General: Bowel sounds are normal.      Palpations: Abdomen is soft.   Musculoskeletal:      Cervical back: Normal range of motion and neck supple.   Skin:     General: Skin is warm and dry.   Neurological:      Mental Status: He is alert and oriented to person, place, and time.   Psychiatric:         Behavior: Behavior normal.           Assessment & Plan   Diagnoses and all orders for this visit:    1. Acute cough (Primary)  No problems on exam today.  I suspect this is a mild cold.  I recommend supportive care for the next few days as needed.  He can follow-up next week if his condition has not improved.  2. Type 2 diabetes mellitus with hyperglycemia, without long-term current use of insulin  -     Microalbumin / Creatinine Urine Ratio - Urine, Clean Catch; Future  -     pioglitazone (ACTOS) 45 MG tablet; Take 1 tablet by mouth Daily.  Dispense: 90 tablet; Refill: 1  -     lisinopril (PRINIVIL,ZESTRIL) 5 MG tablet; Take 1 tablet by mouth Daily.  Dispense: 90 tablet; Refill: 1  -     glipiZIDE-metFORMIN (METAGLIP) 5-500 MG per tablet; Take 1 tablet by mouth 2 (Two) Times a Day Before Meals.  Dispense: 180 tablet; Refill: 1  -     empagliflozin (Jardiance) 25 MG tablet tablet; Take 1 tablet by mouth Daily.  Dispense: 90 tablet; Refill: 1  -     CBC No Differential; Future  -     Comprehensive metabolic panel; Future  -     Hemoglobin A1c; Future  He is agreeable to blood work.  I recommend that he come in for blood work next week, rather than today because of acute illness.  Will follow-up as needed.  3. Generalized anxiety disorder  -     traZODone (DESYREL) 150 MG tablet; Take 1 tablet by mouth Every Night.  Dispense: 90  tablet; Refill: 0    4. Other insomnia  -     traZODone (DESYREL) 150 MG tablet; Take 1 tablet by mouth Every Night.  Dispense: 90 tablet; Refill: 0                  BMI is >= 30 and <35. (Class 1 Obesity). The following options were offered after discussion;: exercise counseling/recommendations and nutrition counseling/recommendations          This document has been electronically signed by Margie Atkins  June 30, 2025 10:26 EDT    Dictated Utilizing Dragon Dictation: Part of this note may be an electronic transcription/translation of spoken language to printed text using the Dragon Dictation System.

## 2025-07-09 ENCOUNTER — OFFICE VISIT (OUTPATIENT)
Dept: PSYCHIATRY | Facility: CLINIC | Age: 55
End: 2025-07-09
Payer: MEDICARE

## 2025-07-09 VITALS
DIASTOLIC BLOOD PRESSURE: 44 MMHG | BODY MASS INDEX: 31.44 KG/M2 | HEIGHT: 74 IN | HEART RATE: 80 BPM | WEIGHT: 245 LBS | OXYGEN SATURATION: 94 % | SYSTOLIC BLOOD PRESSURE: 117 MMHG

## 2025-07-09 DIAGNOSIS — F41.1 GENERALIZED ANXIETY DISORDER: Primary | ICD-10-CM

## 2025-07-09 DIAGNOSIS — F43.10 PTSD (POST-TRAUMATIC STRESS DISORDER): ICD-10-CM

## 2025-07-09 DIAGNOSIS — Z79.899 MEDICATION MANAGEMENT: ICD-10-CM

## 2025-07-09 DIAGNOSIS — F60.1 SCHIZOID PERSONALITY DISORDER IN ADULT: ICD-10-CM

## 2025-07-09 DIAGNOSIS — G47.09 OTHER INSOMNIA: ICD-10-CM

## 2025-07-09 RX ORDER — HYDROXYZINE HYDROCHLORIDE 10 MG/1
10 TABLET, FILM COATED ORAL 2 TIMES DAILY PRN
Qty: 180 TABLET | Refills: 1 | Status: SHIPPED | OUTPATIENT
Start: 2025-07-09

## 2025-07-09 RX ORDER — FLUOXETINE HYDROCHLORIDE 40 MG/1
80 CAPSULE ORAL DAILY
Qty: 180 CAPSULE | Refills: 1 | Status: SHIPPED | OUTPATIENT
Start: 2025-07-09

## 2025-07-09 RX ORDER — TRAZODONE HYDROCHLORIDE 150 MG/1
150 TABLET ORAL NIGHTLY
Qty: 90 TABLET | Refills: 0 | Status: SHIPPED | OUTPATIENT
Start: 2025-07-09

## 2025-07-09 RX ORDER — BREXPIPRAZOLE 4 MG/1
4 TABLET ORAL DAILY
Qty: 90 TABLET | Refills: 1 | Status: SHIPPED | OUTPATIENT
Start: 2025-07-09

## 2025-07-09 NOTE — PROGRESS NOTES
Subjective   Jamarcus Allison is a 55 y.o. male who presents today for follow up    Chief Complaint:  Anxiety    History of Present Illness: Patient presents as follow up. Reports feeling some situational anxiousness with transportation. Reports he has been staying more at his sister's house to prevent being alone. He reports sleep and appetite is good. States he and his sister help each other with food and supplies. He denies SI/HI/AVH.    The following portions of the patient's history were reviewed and updated as appropriate: allergies, current medications, past family history, past medical history, past social history, past surgical history and problem list.      Past Medical History:  Past Medical History:   Diagnosis Date    Arthritis     Chronic pain disorder     Colon polyp     Depression     Diabetes mellitus     Diabetic neuropathy     Elevated cholesterol     GERD (gastroesophageal reflux disease)     Hypertension     IBS (irritable bowel syndrome)     Obsessive-compulsive disorder     Peripheral artery disease     Peripheral neuropathy     Psychiatric illness     Psychosis     PTSD (post-traumatic stress disorder)     Rectal bleeding     Schizoaffective disorder     Schizoid personality        Social History:  Social History     Socioeconomic History    Marital status: Single   Tobacco Use    Smoking status: Never    Smokeless tobacco: Never   Vaping Use    Vaping status: Never Used   Substance and Sexual Activity    Alcohol use: Never    Drug use: Never    Sexual activity: Not Currently     Partners: Female     Birth control/protection: Condom       Family History:  Family History   Problem Relation Age of Onset    Heart disease Mother     Diabetes Mother     Cancer Father     Alcohol abuse Father     Heart disease Sister     Diabetes Sister     Cancer Sister     Thyroid disease Sister     Depression Sister     Anxiety disorder Sister     ADD / ADHD Sister     Depression Brother     Anxiety disorder  Brother     Alcohol abuse Brother     Drug abuse Brother     Paranoid behavior Brother        Past Surgical History:  Past Surgical History:   Procedure Laterality Date    CATARACT EXTRACTION      COLONOSCOPY      COLONOSCOPY N/A 07/13/2022    Procedure: COLONOSCOPY;  Surgeon: Freddy Mortensen MD;  Location: [facility];  Service: Gastroenterology;  Laterality: N/A;    EYE SURGERY      POLYPECTOMY      VASECTOMY         Problem List:  Patient Active Problem List   Diagnosis    Type 2 diabetes mellitus with hyperglycemia, without long-term current use of insulin    Screening for colon cancer    History of colonic polyps    Rectal bleeding       Allergy:   Allergies   Allergen Reactions    Poison Ivy Extract Anaphylaxis    Poison Oak Extract Anaphylaxis    Sumac Anaphylaxis        Current Medications:   Current Outpatient Medications   Medication Sig Dispense Refill    atorvastatin (LIPITOR) 40 MG tablet Take 1 tablet by mouth Every Night. 90 tablet 1    Blood Glucose Monitoring Suppl (B-D LOGIC BLOOD GLUCOSE) w/Device kit 1 Device Daily. 1 each 0    Brexpiprazole (Rexulti) 4 MG tablet Take 1 tablet by mouth Daily. 90 tablet 1    dicyclomine (BENTYL) 20 MG tablet Take 1 tablet by mouth Every 6 (Six) Hours. 360 tablet 1    empagliflozin (Jardiance) 25 MG tablet tablet Take 1 tablet by mouth Daily. 90 tablet 1    FLUoxetine (PROzac) 40 MG capsule Take 2 capsules by mouth Daily. 180 capsule 1    glipiZIDE-metFORMIN (METAGLIP) 5-500 MG per tablet Take 1 tablet by mouth 2 (Two) Times a Day Before Meals. 180 tablet 1    glucose blood test strip Check daily 200 each 12    Hydrocort-Pramoxine, Perianal, (Proctofoam HC) 1-1 % rectal foam Insert 1 application into the rectum 2 (Two) Times a Day. 1 each 0    hydrOXYzine (ATARAX) 10 MG tablet Take 1 tablet by mouth 2 (Two) Times a Day As Needed for Anxiety. 180 tablet 1    Lancets (freestyle) lancets Check daily fasting 200 each 12    lisinopril (PRINIVIL,ZESTRIL) 5 MG tablet  "Take 1 tablet by mouth Daily. 90 tablet 1    pioglitazone (ACTOS) 45 MG tablet Take 1 tablet by mouth Daily. 90 tablet 1    traZODone (DESYREL) 150 MG tablet Take 1 tablet by mouth Every Night. 90 tablet 0     No current facility-administered medications for this visit.       Review of Symptoms:    Review of Systems   Constitutional:  Positive for fatigue.   HENT: Negative.     Eyes: Negative.    Respiratory: Negative.     Cardiovascular: Negative.    Gastrointestinal: Negative.    Genitourinary: Negative.    Musculoskeletal: Negative.    Skin: Negative.    Neurological: Negative.    Psychiatric/Behavioral:  Positive for sleep disturbance and depressed mood. Negative for suicidal ideas. The patient is nervous/anxious.        Objective   Physical Exam:   Blood pressure 117/44, pulse 80, height 188 cm (74\"), weight 111 kg (245 lb), SpO2 94%.  Body mass index is 31.46 kg/m².    Appearance: Well nourished male, appropriately dressed, appears stated age and in no acute distress  Gait, Station, Strength: WNL    Mental Status Exam:   Hygiene:   good  Cooperation:  Cooperative  Eye Contact:  Good  Psychomotor Behavior:  Appropriate  Affect:  Appropriate  Mood: anxious  Hopelessness: Denies  Speech:  Normal  Thought Process:  Linear  Thought Content:  Mood congruent  Suicidal:  None  Homicidal:  None  Hallucinations:  None  Delusion:  None  Memory:  Intact  Orientation:  Person, Place, Time, and Situation  Reliability:  good  Insight:  Fair  Judgement:  Good  Impulse Control:  Good  Physical/Medical Issues:  see med hx     PHQ-Score Total:  PHQ-9 Total Score: 6   Patient screened positive for depression based on a PHQ-9 score of 6 on 7/9/2025. Follow-up recommendations include: Prescribed antidepressant medication treatment and Suicide Risk Assessment performed.        Lab Results:   No visits with results within 1 Month(s) from this visit.   Latest known visit with results is:   Lab on 01/03/2025   Component Date Value Ref " Range Status    Glucose 01/03/2025 78  65 - 99 mg/dL Final    BUN 01/03/2025 9  6 - 20 mg/dL Final    Creatinine 01/03/2025 0.74 (L)  0.76 - 1.27 mg/dL Final    Sodium 01/03/2025 144  136 - 145 mmol/L Final    Potassium 01/03/2025 3.7  3.5 - 5.2 mmol/L Final    Chloride 01/03/2025 104  98 - 107 mmol/L Final    CO2 01/03/2025 28.0  22.0 - 29.0 mmol/L Final    Calcium 01/03/2025 8.9  8.6 - 10.5 mg/dL Final    Total Protein 01/03/2025 6.7  6.0 - 8.5 g/dL Final    Albumin 01/03/2025 3.9  3.5 - 5.2 g/dL Final    ALT (SGPT) 01/03/2025 38  1 - 41 U/L Final    AST (SGOT) 01/03/2025 29  1 - 40 U/L Final    Alkaline Phosphatase 01/03/2025 109  39 - 117 U/L Final    Total Bilirubin 01/03/2025 0.4  0.0 - 1.2 mg/dL Final    Globulin 01/03/2025 2.8  gm/dL Final    A/G Ratio 01/03/2025 1.4  g/dL Final    BUN/Creatinine Ratio 01/03/2025 12.2  7.0 - 25.0 Final    Anion Gap 01/03/2025 12.0  5.0 - 15.0 mmol/L Final    eGFR 01/03/2025 107.7  >60.0 mL/min/1.73 Final    Hemoglobin A1C 01/03/2025 5.60  4.80 - 5.60 % Final    Total Cholesterol 01/03/2025 110  0 - 200 mg/dL Final    Triglycerides 01/03/2025 88  0 - 150 mg/dL Final    HDL Cholesterol 01/03/2025 32 (L)  40 - 60 mg/dL Final    LDL Cholesterol  01/03/2025 61  0 - 100 mg/dL Final    VLDL Cholesterol 01/03/2025 17  5 - 40 mg/dL Final    LDL/HDL Ratio 01/03/2025 1.89   Final    WBC 01/03/2025 7.73  3.40 - 10.80 10*3/mm3 Final    RBC 01/03/2025 4.80  4.14 - 5.80 10*6/mm3 Final    Hemoglobin 01/03/2025 14.2  13.0 - 17.7 g/dL Final    Hematocrit 01/03/2025 43.3  37.5 - 51.0 % Final    MCV 01/03/2025 90.2  79.0 - 97.0 fL Final    MCH 01/03/2025 29.6  26.6 - 33.0 pg Final    MCHC 01/03/2025 32.8  31.5 - 35.7 g/dL Final    RDW 01/03/2025 12.3  12.3 - 15.4 % Final    RDW-SD 01/03/2025 40.3  37.0 - 54.0 fl Final    MPV 01/03/2025 12.5 (H)  6.0 - 12.0 fL Final    Platelets 01/03/2025 241  140 - 450 10*3/mm3 Final    TSH 01/03/2025 0.788  0.270 - 4.200 uIU/mL Final       Assessment & Plan    Diagnoses and all orders for this visit:    1. Generalized anxiety disorder (Primary)  -     Brexpiprazole (Rexulti) 4 MG tablet; Take 1 tablet by mouth Daily.  Dispense: 90 tablet; Refill: 1  -     FLUoxetine (PROzac) 40 MG capsule; Take 2 capsules by mouth Daily.  Dispense: 180 capsule; Refill: 1  -     hydrOXYzine (ATARAX) 10 MG tablet; Take 1 tablet by mouth 2 (Two) Times a Day As Needed for Anxiety.  Dispense: 180 tablet; Refill: 1  -     traZODone (DESYREL) 150 MG tablet; Take 1 tablet by mouth Every Night.  Dispense: 90 tablet; Refill: 0    2. Schizoid personality disorder in adult  -     Brexpiprazole (Rexulti) 4 MG tablet; Take 1 tablet by mouth Daily.  Dispense: 90 tablet; Refill: 1  -     FLUoxetine (PROzac) 40 MG capsule; Take 2 capsules by mouth Daily.  Dispense: 180 capsule; Refill: 1    3. Other insomnia  -     traZODone (DESYREL) 150 MG tablet; Take 1 tablet by mouth Every Night.  Dispense: 90 tablet; Refill: 0    4. Medication management    5. PTSD (post-traumatic stress disorder)        -Continue fluoxetine 80 mg daily for anxiety and depression   -Continue brexpiprazole 4 mg daily for AVH and paranoia. Lengthy discussion with patient on the possible side effects of antipsychotic medications including increased cholesterol, increased blood sugar, and possibility of weight gain.  Also discussed the need to monitor lab work associated with this. The risk of muscle movement disorders with this class of medication was also discussed.  -Continue trazodone 150 mg nightly for sleep  -Continue hydroxyzine 10 mg twice daily as needed for anxiety   -Encouraged patient to begin therapy  -YADIEL reviewed and appropriate. Patient counseled on use of controlled substances.  -The benefits of a healthy diet and exercise were discussed with patient, especially the positive effects they have on mental health. Patient encouraged to consider lifestyle modification regarding  diet and exercise patterns to maximize  results of mental health treatment.  -Reviewed previous available documentation  -Reviewed most recent available labs                  Visit Diagnoses:    ICD-10-CM ICD-9-CM   1. Generalized anxiety disorder  F41.1 300.02   2. Schizoid personality disorder in adult  F60.1 301.20   3. Other insomnia  G47.09 780.52   4. Medication management  Z79.899 V58.69   5. PTSD (post-traumatic stress disorder)  F43.10 309.81         TREATMENT PLAN/GOALS: Continue supportive psychotherapy efforts and medications as indicated. Treatment and medication options discussed during today's visit. Patient acknowledged and verbally consented to continue with current treatment plan and was educated on the importance of compliance with treatment and follow-up appointments.    MEDICATION ISSUES:    Discussed medication options and treatment plan of prescribed medication as well as the risks, benefits, and side effects including potential falls, possible impaired driving and metabolic adversities among others. Patient is agreeable to call the office with any worsening of symptoms or onset of side effects. Patient is agreeable to call 911 or go to the nearest ER should he/she begin having SI/HI.     MEDS ORDERED DURING VISIT:  New Medications Ordered This Visit   Medications    Brexpiprazole (Rexulti) 4 MG tablet     Sig: Take 1 tablet by mouth Daily.     Dispense:  90 tablet     Refill:  1    FLUoxetine (PROzac) 40 MG capsule     Sig: Take 2 capsules by mouth Daily.     Dispense:  180 capsule     Refill:  1    hydrOXYzine (ATARAX) 10 MG tablet     Sig: Take 1 tablet by mouth 2 (Two) Times a Day As Needed for Anxiety.     Dispense:  180 tablet     Refill:  1    traZODone (DESYREL) 150 MG tablet     Sig: Take 1 tablet by mouth Every Night.     Dispense:  90 tablet     Refill:  0       Return in about 6 months (around 1/9/2026), or if symptoms worsen or fail to improve.         Prognosis: Guarded dependent on medication/follow up and treatment  plan compliance.  Functionality: pt showing improvements in important areas of daily functioning.     Short-term goals: Patient will adhere to medication regimen and note continued improvement in symptoms over the next 3 months.   Long-term goals: Patient will be adherent to medication management and psychotherapy with continued improvement in symptoms over the next 6 months          This document has been electronically signed by RUPINDER Aguilar   July 9, 2025 15:06 EDT    Portions of this note may have been copied from previous notes but have been reviewed as of this date for appropriateness    Part of this note may be an electronic transcription/translation of spoken language to printed text using the Dragon Dictation System.

## 2025-07-18 ENCOUNTER — LAB (OUTPATIENT)
Dept: FAMILY MEDICINE CLINIC | Facility: CLINIC | Age: 55
End: 2025-07-18
Payer: MEDICARE

## 2025-07-18 DIAGNOSIS — E11.65 TYPE 2 DIABETES MELLITUS WITH HYPERGLYCEMIA, WITHOUT LONG-TERM CURRENT USE OF INSULIN: ICD-10-CM

## 2025-07-18 PROCEDURE — 83036 HEMOGLOBIN GLYCOSYLATED A1C: CPT | Performed by: FAMILY MEDICINE

## 2025-07-18 PROCEDURE — 80053 COMPREHEN METABOLIC PANEL: CPT | Performed by: FAMILY MEDICINE

## 2025-07-18 PROCEDURE — 82043 UR ALBUMIN QUANTITATIVE: CPT | Performed by: FAMILY MEDICINE

## 2025-07-18 PROCEDURE — 82570 ASSAY OF URINE CREATININE: CPT | Performed by: FAMILY MEDICINE

## 2025-07-18 PROCEDURE — 85027 COMPLETE CBC AUTOMATED: CPT | Performed by: FAMILY MEDICINE

## 2025-07-19 LAB
ALBUMIN SERPL-MCNC: 4.1 G/DL (ref 3.5–5.2)
ALBUMIN UR-MCNC: <1.2 MG/DL
ALBUMIN/GLOB SERPL: 1.2 G/DL
ALP SERPL-CCNC: 102 U/L (ref 39–117)
ALT SERPL W P-5'-P-CCNC: 53 U/L (ref 1–41)
ANION GAP SERPL CALCULATED.3IONS-SCNC: 11.9 MMOL/L (ref 5–15)
AST SERPL-CCNC: 33 U/L (ref 1–40)
BILIRUB SERPL-MCNC: 0.4 MG/DL (ref 0–1.2)
BUN SERPL-MCNC: 11 MG/DL (ref 6–20)
BUN/CREAT SERPL: 17.2 (ref 7–25)
CALCIUM SPEC-SCNC: 9.3 MG/DL (ref 8.6–10.5)
CHLORIDE SERPL-SCNC: 104 MMOL/L (ref 98–107)
CO2 SERPL-SCNC: 23.1 MMOL/L (ref 22–29)
CREAT SERPL-MCNC: 0.64 MG/DL (ref 0.76–1.27)
CREAT UR-MCNC: 63.4 MG/DL
DEPRECATED RDW RBC AUTO: 44.8 FL (ref 37–54)
EGFRCR SERPLBLD CKD-EPI 2021: 111.8 ML/MIN/1.73
ERYTHROCYTE [DISTWIDTH] IN BLOOD BY AUTOMATED COUNT: 13.4 % (ref 12.3–15.4)
GLOBULIN UR ELPH-MCNC: 3.4 GM/DL
GLUCOSE SERPL-MCNC: 145 MG/DL (ref 65–99)
HBA1C MFR BLD: 6.8 % (ref 4.8–5.6)
HCT VFR BLD AUTO: 47.8 % (ref 37.5–51)
HGB BLD-MCNC: 15.5 G/DL (ref 13–17.7)
MCH RBC QN AUTO: 29.4 PG (ref 26.6–33)
MCHC RBC AUTO-ENTMCNC: 32.4 G/DL (ref 31.5–35.7)
MCV RBC AUTO: 90.5 FL (ref 79–97)
MICROALBUMIN/CREAT UR: NORMAL MG/G{CREAT}
PLATELET # BLD AUTO: 288 10*3/MM3 (ref 140–450)
PMV BLD AUTO: 12.7 FL (ref 6–12)
POTASSIUM SERPL-SCNC: 4 MMOL/L (ref 3.5–5.2)
PROT SERPL-MCNC: 7.5 G/DL (ref 6–8.5)
RBC # BLD AUTO: 5.28 10*6/MM3 (ref 4.14–5.8)
SODIUM SERPL-SCNC: 139 MMOL/L (ref 136–145)
WBC NRBC COR # BLD AUTO: 7.57 10*3/MM3 (ref 3.4–10.8)

## 2025-07-21 ENCOUNTER — RESULTS FOLLOW-UP (OUTPATIENT)
Dept: FAMILY MEDICINE CLINIC | Facility: CLINIC | Age: 55
End: 2025-07-21
Payer: MEDICARE

## 2025-07-21 NOTE — LETTER
Jamarcus Allison  [address]  [place] [address]    July 23, 2025     Dear Mr. Allison:    Below are the results from your recent visit:We have been unable to reach you by phone ,  I reviewed your blood work.  Your diabetes numbers have gone up as well as your liver enzymes.  I recommend follow-up sooner than currently planned.  Follow-up in a month or so recommended to recheck labs and to consider more medication for diabetes.  Please call the office & schedule a follow up.           Resulted Orders   Microalbumin / Creatinine Urine Ratio - Urine, Clean Catch   Result Value Ref Range    Microalbumin/Creatinine Ratio        Comment:      Unable to calculate    Creatinine, Urine 63.4 mg/dL    Microalbumin, Urine <1.2 mg/dL   CBC No Differential   Result Value Ref Range    WBC 7.57 3.40 - 10.80 10*3/mm3    RBC 5.28 4.14 - 5.80 10*6/mm3    Hemoglobin 15.5 13.0 - 17.7 g/dL    Hematocrit 47.8 37.5 - 51.0 %    MCV 90.5 79.0 - 97.0 fL    MCH 29.4 26.6 - 33.0 pg    MCHC 32.4 31.5 - 35.7 g/dL    RDW 13.4 12.3 - 15.4 %    RDW-SD 44.8 37.0 - 54.0 fl    MPV 12.7 (H) 6.0 - 12.0 fL    Platelets 288 140 - 450 10*3/mm3   Comprehensive metabolic panel   Result Value Ref Range    Glucose 145 (H) 65 - 99 mg/dL    BUN 11.0 6.0 - 20.0 mg/dL    Creatinine 0.64 (L) 0.76 - 1.27 mg/dL    Sodium 139 136 - 145 mmol/L    Potassium 4.0 3.5 - 5.2 mmol/L    Chloride 104 98 - 107 mmol/L    CO2 23.1 22.0 - 29.0 mmol/L    Calcium 9.3 8.6 - 10.5 mg/dL    Total Protein 7.5 6.0 - 8.5 g/dL    Albumin 4.1 3.5 - 5.2 g/dL    ALT (SGPT) 53 (H) 1 - 41 U/L    AST (SGOT) 33 1 - 40 U/L    Alkaline Phosphatase 102 39 - 117 U/L    Total Bilirubin 0.4 0.0 - 1.2 mg/dL    Globulin 3.4 gm/dL    A/G Ratio 1.2 g/dL    BUN/Creatinine Ratio 17.2 7.0 - 25.0    Anion Gap 11.9 5.0 - 15.0 mmol/L    eGFR 111.8 >60.0 mL/min/1.73   Hemoglobin A1c   Result Value Ref Range    Hemoglobin A1C 6.80 (H) 4.80 - 5.60 %         If you have any questions or concerns, please don't  hesitate to call.         Sincerely,        Everardo Venegas, DO

## 2025-07-21 NOTE — PROGRESS NOTES
I reviewed your blood work.  Your diabetes numbers have gone up as well as your liver enzymes.  I recommend follow-up sooner than currently planned.  Follow-up in a month or so recommended to recheck labs and to consider more medication for diabetes.

## (undated) DEVICE — ENDOGATOR AUXILIARY WATER JET CONNECTOR: Brand: ENDOGATOR

## (undated) DEVICE — Device: Brand: DEFENDO AIR/WATER/SUCTION AND BIOPSY VALVE

## (undated) DEVICE — Device

## (undated) DEVICE — CONN Y IRR DISP 1P/U

## (undated) DEVICE — ENDOGATOR TUBING FOR ENDOGATOR EGP-100 IRRIGATION PUMP,OLYMPUS OFP PUMP, OLYMPUS AFU-100 PUMP AND ERBE EIP2 PUMP: Brand: ENDOGATOR

## (undated) DEVICE — FRCP BX RADJAW4 NDL 2.8 240CM LG OG BX40